# Patient Record
Sex: MALE
[De-identification: names, ages, dates, MRNs, and addresses within clinical notes are randomized per-mention and may not be internally consistent; named-entity substitution may affect disease eponyms.]

---

## 2017-07-11 ENCOUNTER — RECORDS - HEALTHEAST (OUTPATIENT)
Dept: ADMINISTRATIVE | Facility: OTHER | Age: 82
End: 2017-07-11

## 2018-06-08 ENCOUNTER — RECORDS - HEALTHEAST (OUTPATIENT)
Dept: ADMINISTRATIVE | Facility: OTHER | Age: 83
End: 2018-06-08

## 2018-07-05 ENCOUNTER — RECORDS - HEALTHEAST (OUTPATIENT)
Dept: ADMINISTRATIVE | Facility: OTHER | Age: 83
End: 2018-07-05

## 2018-07-31 ENCOUNTER — RECORDS - HEALTHEAST (OUTPATIENT)
Dept: ADMINISTRATIVE | Facility: OTHER | Age: 83
End: 2018-07-31

## 2018-07-31 ENCOUNTER — RECORDS - HEALTHEAST (OUTPATIENT)
Dept: LAB | Facility: CLINIC | Age: 83
End: 2018-07-31

## 2018-07-31 LAB
ALBUMIN SERPL-MCNC: 3.5 G/DL (ref 3.5–5)
ALP SERPL-CCNC: 131 U/L (ref 45–120)
ALT SERPL W P-5'-P-CCNC: 36 U/L (ref 0–45)
ANION GAP SERPL CALCULATED.3IONS-SCNC: 9 MMOL/L (ref 5–18)
AST SERPL W P-5'-P-CCNC: 32 U/L (ref 0–40)
BILIRUB SERPL-MCNC: 0.6 MG/DL (ref 0–1)
BUN SERPL-MCNC: 21 MG/DL (ref 8–28)
CALCIUM SERPL-MCNC: 9.4 MG/DL (ref 8.5–10.5)
CHLORIDE BLD-SCNC: 103 MMOL/L (ref 98–107)
CO2 SERPL-SCNC: 27 MMOL/L (ref 22–31)
CREAT SERPL-MCNC: 1.15 MG/DL (ref 0.7–1.3)
GFR SERPL CREATININE-BSD FRML MDRD: >60 ML/MIN/1.73M2
GLUCOSE BLD-MCNC: 121 MG/DL (ref 70–125)
POTASSIUM BLD-SCNC: 5.2 MMOL/L (ref 3.5–5)
PROT SERPL-MCNC: 6.8 G/DL (ref 6–8)
SODIUM SERPL-SCNC: 139 MMOL/L (ref 136–145)
TSH SERPL DL<=0.005 MIU/L-ACNC: 0.09 UIU/ML (ref 0.3–5)

## 2018-08-06 ENCOUNTER — RECORDS - HEALTHEAST (OUTPATIENT)
Dept: ADMINISTRATIVE | Facility: OTHER | Age: 83
End: 2018-08-06

## 2018-09-06 ENCOUNTER — OFFICE VISIT - HEALTHEAST (OUTPATIENT)
Dept: FAMILY MEDICINE | Facility: CLINIC | Age: 83
End: 2018-09-06

## 2018-09-06 DIAGNOSIS — Z87.442 HISTORY OF KIDNEY STONES: ICD-10-CM

## 2018-09-06 DIAGNOSIS — N40.0 BPH (BENIGN PROSTATIC HYPERPLASIA): ICD-10-CM

## 2018-09-06 DIAGNOSIS — E11.9 TYPE 2 DIABETES MELLITUS WITHOUT COMPLICATION, WITHOUT LONG-TERM CURRENT USE OF INSULIN (H): ICD-10-CM

## 2018-09-06 DIAGNOSIS — Z76.89 ESTABLISHING CARE WITH NEW DOCTOR, ENCOUNTER FOR: ICD-10-CM

## 2018-09-06 DIAGNOSIS — E03.9 HYPOTHYROIDISM: ICD-10-CM

## 2018-09-06 DIAGNOSIS — R06.02 SOB (SHORTNESS OF BREATH): ICD-10-CM

## 2018-09-06 DIAGNOSIS — I10 ESSENTIAL HYPERTENSION: ICD-10-CM

## 2018-09-06 LAB
ALBUMIN SERPL-MCNC: 3.3 G/DL (ref 3.5–5)
ALP SERPL-CCNC: 122 U/L (ref 45–120)
ALT SERPL W P-5'-P-CCNC: 40 U/L (ref 0–45)
ANION GAP SERPL CALCULATED.3IONS-SCNC: 8 MMOL/L (ref 5–18)
AST SERPL W P-5'-P-CCNC: 28 U/L (ref 0–40)
BASOPHILS # BLD AUTO: 0 THOU/UL (ref 0–0.2)
BASOPHILS NFR BLD AUTO: 0 % (ref 0–2)
BILIRUB SERPL-MCNC: 0.6 MG/DL (ref 0–1)
BUN SERPL-MCNC: 22 MG/DL (ref 8–28)
CALCIUM SERPL-MCNC: 9.2 MG/DL (ref 8.5–10.5)
CHLORIDE BLD-SCNC: 102 MMOL/L (ref 98–107)
CO2 SERPL-SCNC: 30 MMOL/L (ref 22–31)
CREAT SERPL-MCNC: 1.09 MG/DL (ref 0.7–1.3)
EOSINOPHIL # BLD AUTO: 0.2 THOU/UL (ref 0–0.4)
EOSINOPHIL NFR BLD AUTO: 2 % (ref 0–6)
ERYTHROCYTE [DISTWIDTH] IN BLOOD BY AUTOMATED COUNT: 13.6 % (ref 11–14.5)
GFR SERPL CREATININE-BSD FRML MDRD: >60 ML/MIN/1.73M2
GLUCOSE BLD-MCNC: 84 MG/DL (ref 70–125)
HBA1C MFR BLD: 6.7 % (ref 3.5–6)
HCT VFR BLD AUTO: 39.5 % (ref 40–54)
HGB BLD-MCNC: 13.1 G/DL (ref 14–18)
LYMPHOCYTES # BLD AUTO: 1 THOU/UL (ref 0.8–4.4)
LYMPHOCYTES NFR BLD AUTO: 14 % (ref 20–40)
MCH RBC QN AUTO: 32.3 PG (ref 27–34)
MCHC RBC AUTO-ENTMCNC: 33.2 G/DL (ref 32–36)
MCV RBC AUTO: 97 FL (ref 80–100)
MONOCYTES # BLD AUTO: 1 THOU/UL (ref 0–0.9)
MONOCYTES NFR BLD AUTO: 14 % (ref 2–10)
NEUTROPHILS # BLD AUTO: 5.1 THOU/UL (ref 2–7.7)
NEUTROPHILS NFR BLD AUTO: 70 % (ref 50–70)
PLATELET # BLD AUTO: 133 THOU/UL (ref 140–440)
PMV BLD AUTO: 9 FL (ref 7–10)
POTASSIUM BLD-SCNC: 4.4 MMOL/L (ref 3.5–5)
PROT SERPL-MCNC: 6.6 G/DL (ref 6–8)
RBC # BLD AUTO: 4.06 MILL/UL (ref 4.4–6.2)
SODIUM SERPL-SCNC: 140 MMOL/L (ref 136–145)
TSH SERPL DL<=0.005 MIU/L-ACNC: 1.51 UIU/ML (ref 0.3–5)
WBC: 7.3 THOU/UL (ref 4–11)

## 2018-09-06 ASSESSMENT — MIFFLIN-ST. JEOR: SCORE: 1585.04

## 2018-09-07 ENCOUNTER — RECORDS - HEALTHEAST (OUTPATIENT)
Dept: ADMINISTRATIVE | Facility: OTHER | Age: 83
End: 2018-09-07

## 2018-09-11 ENCOUNTER — COMMUNICATION - HEALTHEAST (OUTPATIENT)
Dept: FAMILY MEDICINE | Facility: CLINIC | Age: 83
End: 2018-09-11

## 2018-09-12 ENCOUNTER — COMMUNICATION - HEALTHEAST (OUTPATIENT)
Dept: FAMILY MEDICINE | Facility: CLINIC | Age: 83
End: 2018-09-12

## 2018-09-14 ENCOUNTER — COMMUNICATION - HEALTHEAST (OUTPATIENT)
Dept: FAMILY MEDICINE | Facility: CLINIC | Age: 83
End: 2018-09-14

## 2018-09-14 DIAGNOSIS — R68.89 DECREASED EXERCISE TOLERANCE: ICD-10-CM

## 2018-09-14 DIAGNOSIS — R06.09 DYSPNEA ON EXERTION: ICD-10-CM

## 2018-09-14 DIAGNOSIS — R05.3 CHRONIC COUGH: ICD-10-CM

## 2018-09-26 ENCOUNTER — COMMUNICATION - HEALTHEAST (OUTPATIENT)
Dept: FAMILY MEDICINE | Facility: CLINIC | Age: 83
End: 2018-09-26

## 2018-09-27 ENCOUNTER — AMBULATORY - HEALTHEAST (OUTPATIENT)
Dept: FAMILY MEDICINE | Facility: CLINIC | Age: 83
End: 2018-09-27

## 2018-09-27 DIAGNOSIS — I10 ESSENTIAL HYPERTENSION: ICD-10-CM

## 2018-10-10 ENCOUNTER — COMMUNICATION - HEALTHEAST (OUTPATIENT)
Dept: FAMILY MEDICINE | Facility: CLINIC | Age: 83
End: 2018-10-10

## 2018-10-10 DIAGNOSIS — E11.9 TYPE 2 DIABETES MELLITUS WITHOUT COMPLICATION, WITHOUT LONG-TERM CURRENT USE OF INSULIN (H): ICD-10-CM

## 2018-10-11 ENCOUNTER — COMMUNICATION - HEALTHEAST (OUTPATIENT)
Dept: FAMILY MEDICINE | Facility: CLINIC | Age: 83
End: 2018-10-11

## 2018-10-11 ENCOUNTER — OFFICE VISIT - HEALTHEAST (OUTPATIENT)
Dept: FAMILY MEDICINE | Facility: CLINIC | Age: 83
End: 2018-10-11

## 2018-10-11 DIAGNOSIS — R06.02 SOB (SHORTNESS OF BREATH): ICD-10-CM

## 2018-10-11 DIAGNOSIS — I10 ESSENTIAL HYPERTENSION: ICD-10-CM

## 2018-10-13 ENCOUNTER — COMMUNICATION - HEALTHEAST (OUTPATIENT)
Dept: FAMILY MEDICINE | Facility: CLINIC | Age: 83
End: 2018-10-13

## 2018-10-15 ENCOUNTER — AMBULATORY - HEALTHEAST (OUTPATIENT)
Dept: FAMILY MEDICINE | Facility: CLINIC | Age: 83
End: 2018-10-15

## 2018-10-15 DIAGNOSIS — E11.9 TYPE 2 DIABETES MELLITUS WITHOUT COMPLICATION, WITHOUT LONG-TERM CURRENT USE OF INSULIN (H): ICD-10-CM

## 2018-10-16 ENCOUNTER — HOSPITAL ENCOUNTER (OUTPATIENT)
Dept: NUCLEAR MEDICINE | Facility: HOSPITAL | Age: 83
Discharge: HOME | End: 2018-10-16
Attending: FAMILY MEDICINE

## 2018-10-16 ENCOUNTER — HOSPITAL ENCOUNTER (OUTPATIENT)
Dept: CARDIOLOGY | Facility: HOSPITAL | Age: 83
Discharge: HOME | End: 2018-10-16
Attending: FAMILY MEDICINE

## 2018-10-16 DIAGNOSIS — R68.89 DECREASED EXERCISE TOLERANCE: ICD-10-CM

## 2018-10-16 DIAGNOSIS — R06.09 OTHER FORMS OF DYSPNEA: ICD-10-CM

## 2018-10-16 DIAGNOSIS — R06.09 DYSPNEA ON EXERTION: ICD-10-CM

## 2018-10-16 LAB
CV STRESS CURRENT BP HE: NORMAL
CV STRESS CURRENT HR HE: 103
CV STRESS CURRENT HR HE: 103
CV STRESS CURRENT HR HE: 105
CV STRESS CURRENT HR HE: 106
CV STRESS CURRENT HR HE: 73
CV STRESS CURRENT HR HE: 74
CV STRESS CURRENT HR HE: 78
CV STRESS CURRENT HR HE: 86
CV STRESS CURRENT HR HE: 87
CV STRESS CURRENT HR HE: 88
CV STRESS CURRENT HR HE: 90
CV STRESS CURRENT HR HE: 91
CV STRESS CURRENT HR HE: 92
CV STRESS CURRENT HR HE: 96
CV STRESS CURRENT HR HE: 97
CV STRESS CURRENT HR HE: 97
CV STRESS DEVIATION TIME HE: NORMAL
CV STRESS ECHO PERCENT HR HE: NORMAL
CV STRESS EXERCISE STAGE HE: NORMAL
CV STRESS FINAL RESTING BP HE: NORMAL
CV STRESS FINAL RESTING HR HE: 87
CV STRESS MAX HR HE: 107
CV STRESS MAX TREADMILL GRADE HE: 0
CV STRESS MAX TREADMILL SPEED HE: 0
CV STRESS PEAK DIA BP HE: NORMAL
CV STRESS PEAK SYS BP HE: NORMAL
CV STRESS PHASE HE: NORMAL
CV STRESS PROTOCOL HE: NORMAL
CV STRESS RESTING PT POSITION HE: NORMAL
CV STRESS ST DEVIATION AMOUNT HE: NORMAL
CV STRESS ST DEVIATION ELEVATION HE: NORMAL
CV STRESS ST EVELATION AMOUNT HE: NORMAL
CV STRESS TEST TYPE HE: NORMAL
CV STRESS TOTAL STAGE TIME MIN 1 HE: NORMAL
NUC STRESS EJECTION FRACTION: 76 %
STRESS ECHO BASELINE BP: NORMAL
STRESS ECHO BASELINE HR: 73
STRESS ECHO CALCULATED PERCENT HR: 79 %
STRESS ECHO LAST STRESS BP: NORMAL
STRESS ECHO LAST STRESS HR: 97

## 2018-10-17 ENCOUNTER — COMMUNICATION - HEALTHEAST (OUTPATIENT)
Dept: FAMILY MEDICINE | Facility: CLINIC | Age: 83
End: 2018-10-17

## 2018-10-23 ENCOUNTER — HOSPITAL ENCOUNTER (OUTPATIENT)
Dept: RESPIRATORY THERAPY | Facility: HOSPITAL | Age: 83
Discharge: HOME | End: 2018-10-23

## 2018-10-23 DIAGNOSIS — R06.09 DYSPNEA ON EXERTION: ICD-10-CM

## 2018-10-23 DIAGNOSIS — R68.89 DECREASED EXERCISE TOLERANCE: ICD-10-CM

## 2018-10-23 DIAGNOSIS — R05.3 CHRONIC COUGH: ICD-10-CM

## 2018-10-23 DIAGNOSIS — R06.09 OTHER FORMS OF DYSPNEA: ICD-10-CM

## 2018-10-23 LAB — HGB BLD-MCNC: 13.1 G/DL (ref 14–18)

## 2018-10-25 ENCOUNTER — COMMUNICATION - HEALTHEAST (OUTPATIENT)
Dept: FAMILY MEDICINE | Facility: CLINIC | Age: 83
End: 2018-10-25

## 2018-10-25 DIAGNOSIS — E03.9 HYPOTHYROIDISM: ICD-10-CM

## 2018-11-01 ENCOUNTER — OFFICE VISIT - HEALTHEAST (OUTPATIENT)
Dept: FAMILY MEDICINE | Facility: CLINIC | Age: 83
End: 2018-11-01

## 2018-11-01 ENCOUNTER — COMMUNICATION - HEALTHEAST (OUTPATIENT)
Dept: FAMILY MEDICINE | Facility: CLINIC | Age: 83
End: 2018-11-01

## 2018-11-01 DIAGNOSIS — E03.9 HYPOTHYROIDISM: ICD-10-CM

## 2018-11-01 DIAGNOSIS — D64.9 LOW HEMOGLOBIN: ICD-10-CM

## 2018-11-01 DIAGNOSIS — I10 ESSENTIAL HYPERTENSION: ICD-10-CM

## 2018-11-01 DIAGNOSIS — G47.10 HYPERSOMNIA: ICD-10-CM

## 2018-11-01 DIAGNOSIS — J45.50 SEVERE PERSISTENT ASTHMA WITHOUT COMPLICATION (H): ICD-10-CM

## 2018-11-01 LAB
C REACTIVE PROTEIN LHE: 0.4 MG/DL (ref 0–0.8)
ERYTHROCYTE [SEDIMENTATION RATE] IN BLOOD BY WESTERGREN METHOD: 7 MM/HR (ref 0–15)
FERRITIN SERPL-MCNC: 60 NG/ML (ref 27–300)

## 2018-11-02 ENCOUNTER — COMMUNICATION - HEALTHEAST (OUTPATIENT)
Dept: FAMILY MEDICINE | Facility: CLINIC | Age: 83
End: 2018-11-02

## 2018-11-02 DIAGNOSIS — J45.50 SEVERE PERSISTENT ASTHMA WITHOUT COMPLICATION (H): ICD-10-CM

## 2018-11-02 LAB
BASOPHILS # BLD AUTO: 0 THOU/UL (ref 0–0.2)
BASOPHILS NFR BLD AUTO: 1 % (ref 0–2)
EOSINOPHIL # BLD AUTO: 0.1 THOU/UL (ref 0–0.4)
EOSINOPHIL NFR BLD AUTO: 3 % (ref 0–6)
ERYTHROCYTE [DISTWIDTH] IN BLOOD BY AUTOMATED COUNT: 14.8 % (ref 11–14.5)
HCT VFR BLD AUTO: 39.1 % (ref 40–54)
HGB BLD-MCNC: 12.9 G/DL (ref 14–18)
LAB AP CHARGES (HE HISTORICAL CONVERSION): NORMAL
LYMPHOCYTES # BLD AUTO: 1 THOU/UL (ref 0.8–4.4)
LYMPHOCYTES NFR BLD AUTO: 18 % (ref 20–40)
MCH RBC QN AUTO: 32.6 PG (ref 27–34)
MCHC RBC AUTO-ENTMCNC: 33 G/DL (ref 32–36)
MCV RBC AUTO: 99 FL (ref 80–100)
MONOCYTES # BLD AUTO: 0.7 THOU/UL (ref 0–0.9)
MONOCYTES NFR BLD AUTO: 13 % (ref 2–10)
NEUTROPHILS # BLD AUTO: 3.7 THOU/UL (ref 2–7.7)
NEUTROPHILS NFR BLD AUTO: 67 % (ref 50–70)
PATH REPORT.COMMENTS IMP SPEC: NORMAL
PATH REPORT.COMMENTS IMP SPEC: NORMAL
PATH REPORT.FINAL DX SPEC: NORMAL
PATH REPORT.MICROSCOPIC SPEC OTHER STN: ABNORMAL
PATH REPORT.MICROSCOPIC SPEC OTHER STN: NORMAL
PATH REPORT.RELEVANT HX SPEC: NORMAL
PLATELET # BLD AUTO: 134 THOU/UL (ref 140–440)
PMV BLD AUTO: 12 FL (ref 8.5–12.5)
RBC # BLD AUTO: 3.96 MILL/UL (ref 4.4–6.2)
WBC: 5.5 THOU/UL (ref 4–11)

## 2018-11-03 ENCOUNTER — COMMUNICATION - HEALTHEAST (OUTPATIENT)
Dept: FAMILY MEDICINE | Facility: CLINIC | Age: 83
End: 2018-11-03

## 2018-11-05 ENCOUNTER — COMMUNICATION - HEALTHEAST (OUTPATIENT)
Dept: FAMILY MEDICINE | Facility: CLINIC | Age: 83
End: 2018-11-05

## 2018-11-05 ENCOUNTER — AMBULATORY - HEALTHEAST (OUTPATIENT)
Dept: FAMILY MEDICINE | Facility: CLINIC | Age: 83
End: 2018-11-05

## 2018-11-05 DIAGNOSIS — D69.6 THROMBOCYTOPENIA (H): ICD-10-CM

## 2018-11-05 DIAGNOSIS — D64.9 LOW HEMOGLOBIN: ICD-10-CM

## 2018-11-05 LAB
ALBUMIN PERCENT: 57.5 % (ref 51–67)
ALBUMIN SERPL ELPH-MCNC: 3.7 G/DL (ref 3.2–4.7)
ALPHA 1 PERCENT: 2.1 % (ref 2–4)
ALPHA 2 PERCENT: 9 % (ref 5–13)
ALPHA1 GLOB SERPL ELPH-MCNC: 0.1 G/DL (ref 0.1–0.3)
ALPHA2 GLOB SERPL ELPH-MCNC: 0.6 G/DL (ref 0.4–0.9)
B-GLOBULIN SERPL ELPH-MCNC: 0.7 G/DL (ref 0.7–1.2)
BETA PERCENT: 11.1 % (ref 10–17)
GAMMA GLOB SERPL ELPH-MCNC: 1.3 G/DL (ref 0.6–1.4)
GAMMA GLOBULIN PERCENT: 20.3 % (ref 9–20)
PATH ICD:: ABNORMAL
PROT PATTERN SERPL ELPH-IMP: ABNORMAL
PROT SERPL-MCNC: 6.4 G/DL (ref 6–8)
REVIEWING PATHOLOGIST: ABNORMAL

## 2018-11-06 ENCOUNTER — COMMUNICATION - HEALTHEAST (OUTPATIENT)
Dept: FAMILY MEDICINE | Facility: CLINIC | Age: 83
End: 2018-11-06

## 2018-11-08 ENCOUNTER — COMMUNICATION - HEALTHEAST (OUTPATIENT)
Dept: ONCOLOGY | Facility: HOSPITAL | Age: 83
End: 2018-11-08

## 2018-11-09 ENCOUNTER — COMMUNICATION - HEALTHEAST (OUTPATIENT)
Dept: FAMILY MEDICINE | Facility: CLINIC | Age: 83
End: 2018-11-09

## 2018-11-09 DIAGNOSIS — I10 ESSENTIAL HYPERTENSION: ICD-10-CM

## 2018-11-14 ENCOUNTER — COMMUNICATION - HEALTHEAST (OUTPATIENT)
Dept: FAMILY MEDICINE | Facility: CLINIC | Age: 83
End: 2018-11-14

## 2018-11-26 ENCOUNTER — COMMUNICATION - HEALTHEAST (OUTPATIENT)
Dept: SCHEDULING | Facility: CLINIC | Age: 83
End: 2018-11-26

## 2018-11-26 ENCOUNTER — COMMUNICATION - HEALTHEAST (OUTPATIENT)
Dept: FAMILY MEDICINE | Facility: CLINIC | Age: 83
End: 2018-11-26

## 2018-11-26 DIAGNOSIS — E11.9 TYPE 2 DIABETES MELLITUS WITHOUT COMPLICATION, WITHOUT LONG-TERM CURRENT USE OF INSULIN (H): ICD-10-CM

## 2018-11-28 ENCOUNTER — COMMUNICATION - HEALTHEAST (OUTPATIENT)
Dept: FAMILY MEDICINE | Facility: CLINIC | Age: 83
End: 2018-11-28

## 2018-11-28 DIAGNOSIS — E11.9 TYPE 2 DIABETES MELLITUS WITHOUT COMPLICATION, WITHOUT LONG-TERM CURRENT USE OF INSULIN (H): ICD-10-CM

## 2018-11-29 ENCOUNTER — AMBULATORY - HEALTHEAST (OUTPATIENT)
Dept: INFUSION THERAPY | Facility: HOSPITAL | Age: 83
End: 2018-11-29

## 2018-11-29 ENCOUNTER — OFFICE VISIT - HEALTHEAST (OUTPATIENT)
Dept: ONCOLOGY | Facility: HOSPITAL | Age: 83
End: 2018-11-29

## 2018-11-29 DIAGNOSIS — D64.9 LOW HEMOGLOBIN: ICD-10-CM

## 2018-11-29 DIAGNOSIS — D69.6 THROMBOCYTOPENIA (H): ICD-10-CM

## 2018-11-29 LAB
ALBUMIN UR-MCNC: NEGATIVE MG/DL
APPEARANCE UR: CLEAR
BACTERIA #/AREA URNS HPF: ABNORMAL HPF
BILIRUB UR QL STRIP: NEGATIVE
COLOR UR AUTO: YELLOW
FOLATE SERPL-MCNC: 18.7 NG/ML
GLUCOSE UR STRIP-MCNC: NEGATIVE MG/DL
HGB UR QL STRIP: NEGATIVE
HYALINE CASTS #/AREA URNS LPF: ABNORMAL LPF
KETONES UR STRIP-MCNC: NEGATIVE MG/DL
LEUKOCYTE ESTERASE UR QL STRIP: ABNORMAL
MUCOUS THREADS #/AREA URNS LPF: ABNORMAL LPF
NITRATE UR QL: NEGATIVE
PH UR STRIP: 5.5 [PH] (ref 4.5–8)
RBC #/AREA URNS AUTO: ABNORMAL HPF
RETICS # AUTO: 0.05 MILL/UL (ref 0.01–0.11)
SP GR UR STRIP: 1.02 (ref 1–1.03)
SQUAMOUS #/AREA URNS AUTO: ABNORMAL LPF
UROBILINOGEN UR STRIP-ACNC: ABNORMAL
VIT B12 SERPL-MCNC: 249 PG/ML (ref 213–816)
WBC #/AREA URNS AUTO: ABNORMAL HPF

## 2018-11-29 ASSESSMENT — MIFFLIN-ST. JEOR: SCORE: 1582.77

## 2018-11-30 ENCOUNTER — OFFICE VISIT - HEALTHEAST (OUTPATIENT)
Dept: FAMILY MEDICINE | Facility: CLINIC | Age: 83
End: 2018-11-30

## 2018-11-30 DIAGNOSIS — D69.6 THROMBOCYTOPENIA (H): ICD-10-CM

## 2018-11-30 DIAGNOSIS — D64.9 LOW HEMOGLOBIN: ICD-10-CM

## 2018-11-30 DIAGNOSIS — J45.50 SEVERE PERSISTENT ASTHMA WITHOUT COMPLICATION (H): ICD-10-CM

## 2018-11-30 DIAGNOSIS — I10 ESSENTIAL HYPERTENSION: ICD-10-CM

## 2018-12-06 ENCOUNTER — COMMUNICATION - HEALTHEAST (OUTPATIENT)
Dept: ONCOLOGY | Facility: HOSPITAL | Age: 83
End: 2018-12-06

## 2018-12-07 ENCOUNTER — COMMUNICATION - HEALTHEAST (OUTPATIENT)
Dept: FAMILY MEDICINE | Facility: CLINIC | Age: 83
End: 2018-12-07

## 2018-12-21 ENCOUNTER — OFFICE VISIT - HEALTHEAST (OUTPATIENT)
Dept: PULMONOLOGY | Facility: OTHER | Age: 83
End: 2018-12-21

## 2018-12-21 DIAGNOSIS — I49.9 CARDIAC ARRHYTHMIA, UNSPECIFIED CARDIAC ARRHYTHMIA TYPE: ICD-10-CM

## 2018-12-21 DIAGNOSIS — I48.91 ATRIAL FIBRILLATION WITH RAPID VENTRICULAR RESPONSE (H): ICD-10-CM

## 2018-12-21 DIAGNOSIS — R06.09 DOE (DYSPNEA ON EXERTION): ICD-10-CM

## 2018-12-21 LAB
ATRIAL RATE - MUSE: NORMAL BPM
DIASTOLIC BLOOD PRESSURE - MUSE: NORMAL MMHG
INTERPRETATION ECG - MUSE: NORMAL
P AXIS - MUSE: NORMAL DEGREES
PR INTERVAL - MUSE: NORMAL MS
QRS DURATION - MUSE: 78 MS
QT - MUSE: 356 MS
QTC - MUSE: 505 MS
R AXIS - MUSE: 43 DEGREES
SYSTOLIC BLOOD PRESSURE - MUSE: NORMAL MMHG
T AXIS - MUSE: 61 DEGREES
VENTRICULAR RATE- MUSE: 121 BPM

## 2018-12-21 ASSESSMENT — MIFFLIN-ST. JEOR: SCORE: 1566.89

## 2018-12-22 ENCOUNTER — COMMUNICATION - HEALTHEAST (OUTPATIENT)
Dept: FAMILY MEDICINE | Facility: CLINIC | Age: 83
End: 2018-12-22

## 2018-12-26 ENCOUNTER — OFFICE VISIT - HEALTHEAST (OUTPATIENT)
Dept: CARDIOLOGY | Facility: CLINIC | Age: 83
End: 2018-12-26

## 2018-12-26 DIAGNOSIS — I48.19 PERSISTENT ATRIAL FIBRILLATION (H): ICD-10-CM

## 2018-12-26 DIAGNOSIS — I48.91 ATRIAL FIBRILLATION (H): ICD-10-CM

## 2018-12-26 DIAGNOSIS — I10 ESSENTIAL HYPERTENSION: ICD-10-CM

## 2018-12-26 LAB
ATRIAL RATE - MUSE: NORMAL BPM
DIASTOLIC BLOOD PRESSURE - MUSE: NORMAL MMHG
INTERPRETATION ECG - MUSE: NORMAL
P AXIS - MUSE: NORMAL DEGREES
PR INTERVAL - MUSE: NORMAL MS
QRS DURATION - MUSE: 80 MS
QT - MUSE: 376 MS
QTC - MUSE: 444 MS
R AXIS - MUSE: 30 DEGREES
SYSTOLIC BLOOD PRESSURE - MUSE: NORMAL MMHG
T AXIS - MUSE: 48 DEGREES
VENTRICULAR RATE- MUSE: 84 BPM

## 2018-12-26 ASSESSMENT — MIFFLIN-ST. JEOR: SCORE: 1579.54

## 2018-12-27 ENCOUNTER — COMMUNICATION - HEALTHEAST (OUTPATIENT)
Dept: FAMILY MEDICINE | Facility: CLINIC | Age: 83
End: 2018-12-27

## 2018-12-27 ENCOUNTER — AMBULATORY - HEALTHEAST (OUTPATIENT)
Dept: CARDIOLOGY | Facility: CLINIC | Age: 83
End: 2018-12-27

## 2018-12-28 ENCOUNTER — COMMUNICATION - HEALTHEAST (OUTPATIENT)
Dept: CARDIOLOGY | Facility: CLINIC | Age: 83
End: 2018-12-28

## 2019-01-01 ENCOUNTER — COMMUNICATION - HEALTHEAST (OUTPATIENT)
Dept: CARDIOLOGY | Facility: CLINIC | Age: 84
End: 2019-01-01

## 2019-01-01 ENCOUNTER — COMMUNICATION - HEALTHEAST (OUTPATIENT)
Dept: PULMONOLOGY | Facility: OTHER | Age: 84
End: 2019-01-01

## 2019-01-01 ENCOUNTER — COMMUNICATION - HEALTHEAST (OUTPATIENT)
Dept: FAMILY MEDICINE | Facility: CLINIC | Age: 84
End: 2019-01-01

## 2019-01-01 DIAGNOSIS — I48.91 ATRIAL FIBRILLATION (H): ICD-10-CM

## 2019-01-07 ENCOUNTER — COMMUNICATION - HEALTHEAST (OUTPATIENT)
Dept: FAMILY MEDICINE | Facility: CLINIC | Age: 84
End: 2019-01-07

## 2019-01-08 ENCOUNTER — COMMUNICATION - HEALTHEAST (OUTPATIENT)
Dept: CARDIOLOGY | Facility: CLINIC | Age: 84
End: 2019-01-08

## 2019-01-08 DIAGNOSIS — I48.91 ATRIAL FIBRILLATION (H): ICD-10-CM

## 2019-01-09 ENCOUNTER — COMMUNICATION - HEALTHEAST (OUTPATIENT)
Dept: FAMILY MEDICINE | Facility: CLINIC | Age: 84
End: 2019-01-09

## 2019-01-10 ENCOUNTER — COMMUNICATION - HEALTHEAST (OUTPATIENT)
Dept: FAMILY MEDICINE | Facility: CLINIC | Age: 84
End: 2019-01-10

## 2019-01-11 ENCOUNTER — COMMUNICATION - HEALTHEAST (OUTPATIENT)
Dept: FAMILY MEDICINE | Facility: CLINIC | Age: 84
End: 2019-01-11

## 2019-01-11 DIAGNOSIS — I48.91 ATRIAL FIBRILLATION (H): ICD-10-CM

## 2019-01-12 ENCOUNTER — COMMUNICATION - HEALTHEAST (OUTPATIENT)
Dept: FAMILY MEDICINE | Facility: CLINIC | Age: 84
End: 2019-01-12

## 2019-01-14 ENCOUNTER — COMMUNICATION - HEALTHEAST (OUTPATIENT)
Dept: FAMILY MEDICINE | Facility: CLINIC | Age: 84
End: 2019-01-14

## 2019-01-21 ENCOUNTER — COMMUNICATION - HEALTHEAST (OUTPATIENT)
Dept: FAMILY MEDICINE | Facility: CLINIC | Age: 84
End: 2019-01-21

## 2019-01-23 ENCOUNTER — COMMUNICATION - HEALTHEAST (OUTPATIENT)
Dept: FAMILY MEDICINE | Facility: CLINIC | Age: 84
End: 2019-01-23

## 2019-01-23 DIAGNOSIS — G47.10 HYPERSOMNIA: ICD-10-CM

## 2019-01-23 DIAGNOSIS — E03.9 HYPOTHYROIDISM: ICD-10-CM

## 2019-01-24 ENCOUNTER — COMMUNICATION - HEALTHEAST (OUTPATIENT)
Dept: FAMILY MEDICINE | Facility: CLINIC | Age: 84
End: 2019-01-24

## 2019-01-26 ENCOUNTER — COMMUNICATION - HEALTHEAST (OUTPATIENT)
Dept: FAMILY MEDICINE | Facility: CLINIC | Age: 84
End: 2019-01-26

## 2019-01-26 DIAGNOSIS — E03.9 HYPOTHYROIDISM: ICD-10-CM

## 2019-01-26 DIAGNOSIS — G47.10 HYPERSOMNIA: ICD-10-CM

## 2019-02-04 ENCOUNTER — COMMUNICATION - HEALTHEAST (OUTPATIENT)
Dept: FAMILY MEDICINE | Facility: CLINIC | Age: 84
End: 2019-02-04

## 2019-02-04 DIAGNOSIS — I10 ESSENTIAL HYPERTENSION: ICD-10-CM

## 2019-02-07 ENCOUNTER — OFFICE VISIT - HEALTHEAST (OUTPATIENT)
Dept: FAMILY MEDICINE | Facility: CLINIC | Age: 84
End: 2019-02-07

## 2019-02-07 ENCOUNTER — AMBULATORY - HEALTHEAST (OUTPATIENT)
Dept: CARDIOLOGY | Facility: CLINIC | Age: 84
End: 2019-02-07

## 2019-02-07 DIAGNOSIS — E11.9 TYPE 2 DIABETES MELLITUS WITHOUT COMPLICATION, WITHOUT LONG-TERM CURRENT USE OF INSULIN (H): ICD-10-CM

## 2019-02-07 DIAGNOSIS — D69.6 THROMBOCYTOPENIA (H): ICD-10-CM

## 2019-02-07 DIAGNOSIS — J44.9 SEVERE CHRONIC OBSTRUCTIVE PULMONARY DISEASE (H): ICD-10-CM

## 2019-02-07 DIAGNOSIS — D64.9 LOW HEMOGLOBIN: ICD-10-CM

## 2019-02-07 DIAGNOSIS — E03.9 HYPOTHYROIDISM, UNSPECIFIED TYPE: ICD-10-CM

## 2019-02-07 DIAGNOSIS — Z01.818 ENCOUNTER FOR PREOPERATIVE EXAMINATION FOR GENERAL SURGICAL PROCEDURE: ICD-10-CM

## 2019-02-07 DIAGNOSIS — I48.19 PERSISTENT ATRIAL FIBRILLATION (H): ICD-10-CM

## 2019-02-07 DIAGNOSIS — I10 ESSENTIAL HYPERTENSION: ICD-10-CM

## 2019-02-07 LAB
ALBUMIN SERPL-MCNC: 3.6 G/DL (ref 3.5–5)
ALP SERPL-CCNC: 188 U/L (ref 45–120)
ALT SERPL W P-5'-P-CCNC: 46 U/L (ref 0–45)
ANION GAP SERPL CALCULATED.3IONS-SCNC: 8 MMOL/L (ref 5–18)
AST SERPL W P-5'-P-CCNC: 41 U/L (ref 0–40)
BILIRUB SERPL-MCNC: 0.8 MG/DL (ref 0–1)
BUN SERPL-MCNC: 19 MG/DL (ref 8–28)
CALCIUM SERPL-MCNC: 9.8 MG/DL (ref 8.5–10.5)
CHLORIDE BLD-SCNC: 104 MMOL/L (ref 98–107)
CO2 SERPL-SCNC: 29 MMOL/L (ref 22–31)
CREAT SERPL-MCNC: 1.1 MG/DL (ref 0.7–1.3)
ERYTHROCYTE [DISTWIDTH] IN BLOOD BY AUTOMATED COUNT: 12.9 % (ref 11–14.5)
GFR SERPL CREATININE-BSD FRML MDRD: >60 ML/MIN/1.73M2
GLUCOSE BLD-MCNC: 98 MG/DL (ref 70–125)
HCT VFR BLD AUTO: 37.3 % (ref 40–54)
HGB BLD-MCNC: 12.3 G/DL (ref 14–18)
MCH RBC QN AUTO: 32.5 PG (ref 27–34)
MCHC RBC AUTO-ENTMCNC: 33 G/DL (ref 32–36)
MCV RBC AUTO: 99 FL (ref 80–100)
PLATELET # BLD AUTO: 112 THOU/UL (ref 140–440)
PMV BLD AUTO: 9 FL (ref 7–10)
POTASSIUM BLD-SCNC: 5.5 MMOL/L (ref 3.5–5)
PROT SERPL-MCNC: 6.7 G/DL (ref 6–8)
RBC # BLD AUTO: 3.78 MILL/UL (ref 4.4–6.2)
SODIUM SERPL-SCNC: 141 MMOL/L (ref 136–145)
TSH SERPL DL<=0.005 MIU/L-ACNC: 0.75 UIU/ML (ref 0.3–5)
WBC: 4.6 THOU/UL (ref 4–11)

## 2019-02-07 ASSESSMENT — MIFFLIN-ST. JEOR: SCORE: 1582.32

## 2019-02-08 LAB — HBA1C MFR BLD: 6.5 % (ref 4.2–6.1)

## 2019-02-11 ENCOUNTER — COMMUNICATION - HEALTHEAST (OUTPATIENT)
Dept: FAMILY MEDICINE | Facility: CLINIC | Age: 84
End: 2019-02-11

## 2019-02-11 ENCOUNTER — ANESTHESIA - HEALTHEAST (OUTPATIENT)
Dept: CARDIOLOGY | Facility: CLINIC | Age: 84
End: 2019-02-11

## 2019-02-11 ENCOUNTER — AMBULATORY - HEALTHEAST (OUTPATIENT)
Dept: CARDIOLOGY | Facility: CLINIC | Age: 84
End: 2019-02-11

## 2019-02-11 DIAGNOSIS — I48.19 PERSISTENT ATRIAL FIBRILLATION (H): ICD-10-CM

## 2019-02-12 ENCOUNTER — COMMUNICATION - HEALTHEAST (OUTPATIENT)
Dept: SCHEDULING | Facility: CLINIC | Age: 84
End: 2019-02-12

## 2019-02-12 ENCOUNTER — PATIENT OUTREACH (OUTPATIENT)
Dept: CARE COORDINATION | Facility: CLINIC | Age: 84
End: 2019-02-12

## 2019-02-12 ENCOUNTER — AMBULATORY - HEALTHEAST (OUTPATIENT)
Dept: CARDIOLOGY | Facility: CLINIC | Age: 84
End: 2019-02-12

## 2019-02-12 NOTE — PROGRESS NOTES
Clinic Care Coordination Contact    Clinic Care Coordination Contact  OUTREACH    Referral Information:  Referral Source: IP Report    Primary Diagnosis: Cardiovascular - other    Chief Complaint   Patient presents with     Clinic Care Coordination - Post Hospital     Dc'd from [facility] on 2/11/19       Clinical Concerns:  Current Medical Concerns:  Had a failed cardioversion yesterday. Is having another next Monday. No other concerns.     Current Behavioral Concerns: none identified.     Education Provided to patient: Reviewed role of care coordinator      Health Maintenance Reviewed: Due/Overdue       Living Situation:  Current living arrangement:: I live in a private home with spouse  Type of residence:: Private home - stairs         Financial/Insurance:   Financial/Insurance concerns (GOAL):: No  UCARE for seniors, no financial concerns.     Reso  Advance Care Plan/Directive  Advanced Care Plans/Directives on file:: No  Advanced Care Plan/Directive Status: Declined Further Information    Referrals Placed: None    Patient/Caregiver understanding: Patient and wife do not need any support from care coordinator at this time. Did not want to complete assessment.   He declined having letter with CC information mailed to him.      Plan: No further outreach by this CC.  Kenya Kendall,   [facility]  [email or web address]  [phone]

## 2019-02-15 ENCOUNTER — AMBULATORY - HEALTHEAST (OUTPATIENT)
Dept: CARDIOLOGY | Facility: CLINIC | Age: 84
End: 2019-02-15

## 2019-02-18 ENCOUNTER — ANESTHESIA - HEALTHEAST (OUTPATIENT)
Dept: CARDIOLOGY | Facility: CLINIC | Age: 84
End: 2019-02-18

## 2019-02-18 ENCOUNTER — HOSPITAL ENCOUNTER (OUTPATIENT)
Dept: CARDIOLOGY | Facility: CLINIC | Age: 84
Discharge: HOME | End: 2019-02-18
Attending: NURSE PRACTITIONER | Admitting: INTERNAL MEDICINE

## 2019-02-18 LAB
ATRIAL RATE - MUSE: 67 BPM
DIASTOLIC BLOOD PRESSURE - MUSE: NORMAL MMHG
INTERPRETATION ECG - MUSE: NORMAL
P AXIS - MUSE: 81 DEGREES
PR INTERVAL - MUSE: 228 MS
QRS DURATION - MUSE: 98 MS
QT - MUSE: 440 MS
QTC - MUSE: 464 MS
R AXIS - MUSE: 27 DEGREES
SYSTOLIC BLOOD PRESSURE - MUSE: NORMAL MMHG
T AXIS - MUSE: 44 DEGREES
VENTRICULAR RATE- MUSE: 67 BPM

## 2019-02-18 ASSESSMENT — MIFFLIN-ST. JEOR: SCORE: 1588.44

## 2019-03-01 ENCOUNTER — COMMUNICATION - HEALTHEAST (OUTPATIENT)
Dept: FAMILY MEDICINE | Facility: CLINIC | Age: 84
End: 2019-03-01

## 2019-03-01 ENCOUNTER — COMMUNICATION - HEALTHEAST (OUTPATIENT)
Dept: CARDIOLOGY | Facility: CLINIC | Age: 84
End: 2019-03-01

## 2019-03-03 ENCOUNTER — COMMUNICATION - HEALTHEAST (OUTPATIENT)
Dept: FAMILY MEDICINE | Facility: CLINIC | Age: 84
End: 2019-03-03

## 2019-03-06 ENCOUNTER — OFFICE VISIT - HEALTHEAST (OUTPATIENT)
Dept: PULMONOLOGY | Facility: OTHER | Age: 84
End: 2019-03-06

## 2019-03-06 DIAGNOSIS — R06.09 DYSPNEA ON EXERTION: ICD-10-CM

## 2019-03-06 DIAGNOSIS — J44.9 CHRONIC OBSTRUCTIVE PULMONARY DISEASE, UNSPECIFIED COPD TYPE (H): ICD-10-CM

## 2019-03-11 LAB — A1AT SERPL-MCNC: 88 MG/DL (ref 80–200)

## 2019-03-14 ENCOUNTER — OFFICE VISIT - HEALTHEAST (OUTPATIENT)
Dept: CARDIOLOGY | Facility: CLINIC | Age: 84
End: 2019-03-14

## 2019-03-14 DIAGNOSIS — I10 ESSENTIAL HYPERTENSION: ICD-10-CM

## 2019-03-14 DIAGNOSIS — I48.19 PERSISTENT ATRIAL FIBRILLATION (H): ICD-10-CM

## 2019-03-14 ASSESSMENT — MIFFLIN-ST. JEOR: SCORE: 1584.05

## 2019-03-15 ENCOUNTER — HOSPITAL ENCOUNTER (OUTPATIENT)
Dept: CARDIOLOGY | Facility: HOSPITAL | Age: 84
Discharge: HOME | End: 2019-03-15
Attending: NURSE PRACTITIONER

## 2019-03-15 ENCOUNTER — COMMUNICATION - HEALTHEAST (OUTPATIENT)
Dept: FAMILY MEDICINE | Facility: CLINIC | Age: 84
End: 2019-03-15

## 2019-03-15 ENCOUNTER — HOSPITAL ENCOUNTER (OUTPATIENT)
Dept: RESPIRATORY THERAPY | Facility: HOSPITAL | Age: 84
Discharge: HOME | End: 2019-03-15
Attending: INTERNAL MEDICINE

## 2019-03-15 ENCOUNTER — HOSPITAL ENCOUNTER (OUTPATIENT)
Dept: CT IMAGING | Facility: HOSPITAL | Age: 84
Discharge: HOME | End: 2019-03-15
Attending: INTERNAL MEDICINE

## 2019-03-15 DIAGNOSIS — J44.9 CHRONIC OBSTRUCTIVE PULMONARY DISEASE, UNSPECIFIED COPD TYPE (H): ICD-10-CM

## 2019-03-15 DIAGNOSIS — R06.09 OTHER FORMS OF DYSPNEA: ICD-10-CM

## 2019-03-15 DIAGNOSIS — R06.09 DYSPNEA ON EXERTION: ICD-10-CM

## 2019-03-15 DIAGNOSIS — I48.19 PERSISTENT ATRIAL FIBRILLATION (H): ICD-10-CM

## 2019-03-15 LAB — HGB BLD-MCNC: 12.8 G/DL (ref 14–18)

## 2019-03-19 ENCOUNTER — OFFICE VISIT - HEALTHEAST (OUTPATIENT)
Dept: PULMONOLOGY | Facility: OTHER | Age: 84
End: 2019-03-19

## 2019-03-19 DIAGNOSIS — J47.9 BRONCHIECTASIS WITHOUT ACUTE EXACERBATION (H): ICD-10-CM

## 2019-03-19 DIAGNOSIS — J43.9 PULMONARY EMPHYSEMA, UNSPECIFIED EMPHYSEMA TYPE (H): ICD-10-CM

## 2019-03-19 DIAGNOSIS — J43.1 PANLOBULAR EMPHYSEMA (H): ICD-10-CM

## 2019-03-19 DIAGNOSIS — K21.9 GASTROESOPHAGEAL REFLUX DISEASE, ESOPHAGITIS PRESENCE NOT SPECIFIED: ICD-10-CM

## 2019-03-19 LAB
IGA SERPL-MCNC: 1443 MG/DL
IGA SERPL-MCNC: 494 MG/DL (ref 65–400)
IGM SERPL-MCNC: 140 MG/DL (ref 60–280)

## 2019-03-19 ASSESSMENT — MIFFLIN-ST. JEOR: SCORE: 1578.24

## 2019-03-20 ENCOUNTER — AMBULATORY - HEALTHEAST (OUTPATIENT)
Dept: LAB | Facility: HOSPITAL | Age: 84
End: 2019-03-20

## 2019-03-20 DIAGNOSIS — J43.9 PULMONARY EMPHYSEMA, UNSPECIFIED EMPHYSEMA TYPE (H): ICD-10-CM

## 2019-03-20 DIAGNOSIS — J43.1 PANLOBULAR EMPHYSEMA (H): ICD-10-CM

## 2019-03-20 DIAGNOSIS — J47.9 BRONCHIECTASIS WITHOUT ACUTE EXACERBATION (H): ICD-10-CM

## 2019-03-20 DIAGNOSIS — K21.9 GASTROESOPHAGEAL REFLUX DISEASE, ESOPHAGITIS PRESENCE NOT SPECIFIED: ICD-10-CM

## 2019-03-20 LAB
BASOPHILS # BLD AUTO: 0 THOU/UL (ref 0–0.2)
BASOPHILS NFR BLD AUTO: 0 % (ref 0–2)
EOSINOPHIL # BLD AUTO: 0.1 THOU/UL (ref 0–0.4)
EOSINOPHIL NFR BLD AUTO: 1 % (ref 0–6)
ERYTHROCYTE [DISTWIDTH] IN BLOOD BY AUTOMATED COUNT: 16.2 % (ref 11–14.5)
HCT VFR BLD AUTO: 36.4 % (ref 40–54)
HGB BLD-MCNC: 12.1 G/DL (ref 14–18)
LYMPHOCYTES # BLD AUTO: 0.7 THOU/UL (ref 0.8–4.4)
LYMPHOCYTES NFR BLD AUTO: 8 % (ref 20–40)
MCH RBC QN AUTO: 32.8 PG (ref 27–34)
MCHC RBC AUTO-ENTMCNC: 33.2 G/DL (ref 32–36)
MCV RBC AUTO: 99 FL (ref 80–100)
MONOCYTES # BLD AUTO: 1 THOU/UL (ref 0–0.9)
MONOCYTES NFR BLD AUTO: 12 % (ref 2–10)
NEUTROPHILS # BLD AUTO: 6.3 THOU/UL (ref 2–7.7)
NEUTROPHILS NFR BLD AUTO: 78 % (ref 50–70)
PLATELET # BLD AUTO: 167 THOU/UL (ref 140–440)
PMV BLD AUTO: 10.4 FL (ref 8.5–12.5)
RBC # BLD AUTO: 3.69 MILL/UL (ref 4.4–6.2)
WBC: 8.1 THOU/UL (ref 4–11)

## 2019-03-22 LAB
IGG SERPL-MCNC: 1470 MG/DL (ref 695–1620)
IGG1 SER-MCNC: 686 MG/DL (ref 300–856)
IGG2 SER-MCNC: 493 MG/DL (ref 158–761)
IGG3 SER-MCNC: 225 MG/DL (ref 24–192)
IGG4 SER-MCNC: 56 MG/DL (ref 11–86)

## 2019-03-23 LAB
A1AT PHENOTYP SERPL-IMP: ABNORMAL
A1AT SERPL-MCNC: 101 MG/DL (ref 90–200)
A1AT SS SERPL-MCNC: NEGATIVE G/L
A1AT SZ SERPL-MCNC: ABNORMAL G/L
A1AT ZZ SERPL-MCNC: ABNORMAL G/L
SPECIMEN SOURCE: ABNORMAL

## 2019-04-01 ENCOUNTER — COMMUNICATION - HEALTHEAST (OUTPATIENT)
Dept: CARDIOLOGY | Facility: CLINIC | Age: 84
End: 2019-04-01

## 2019-04-01 ENCOUNTER — COMMUNICATION - HEALTHEAST (OUTPATIENT)
Dept: FAMILY MEDICINE | Facility: CLINIC | Age: 84
End: 2019-04-01

## 2019-04-01 ENCOUNTER — AMBULATORY - HEALTHEAST (OUTPATIENT)
Dept: CARDIOLOGY | Facility: CLINIC | Age: 84
End: 2019-04-01

## 2019-04-01 DIAGNOSIS — J44.9 CHRONIC OBSTRUCTIVE PULMONARY DISEASE, UNSPECIFIED COPD TYPE (H): ICD-10-CM

## 2019-04-01 DIAGNOSIS — I48.19 PERSISTENT ATRIAL FIBRILLATION (H): ICD-10-CM

## 2019-04-05 ENCOUNTER — COMMUNICATION - HEALTHEAST (OUTPATIENT)
Dept: CARDIOLOGY | Facility: CLINIC | Age: 84
End: 2019-04-05

## 2019-04-07 ENCOUNTER — COMMUNICATION - HEALTHEAST (OUTPATIENT)
Dept: FAMILY MEDICINE | Facility: CLINIC | Age: 84
End: 2019-04-07

## 2019-04-07 DIAGNOSIS — R35.1 NOCTURIA: ICD-10-CM

## 2019-04-12 ENCOUNTER — COMMUNICATION - HEALTHEAST (OUTPATIENT)
Dept: CARDIOLOGY | Facility: CLINIC | Age: 84
End: 2019-04-12

## 2019-04-15 ENCOUNTER — COMMUNICATION - HEALTHEAST (OUTPATIENT)
Dept: FAMILY MEDICINE | Facility: CLINIC | Age: 84
End: 2019-04-15

## 2019-04-16 ENCOUNTER — COMMUNICATION - HEALTHEAST (OUTPATIENT)
Dept: CARDIOLOGY | Facility: CLINIC | Age: 84
End: 2019-04-16

## 2019-04-17 ENCOUNTER — AMBULATORY - HEALTHEAST (OUTPATIENT)
Dept: CARDIOLOGY | Facility: CLINIC | Age: 84
End: 2019-04-17

## 2019-04-17 DIAGNOSIS — I48.19 PERSISTENT ATRIAL FIBRILLATION (H): ICD-10-CM

## 2019-04-19 ENCOUNTER — HOSPITAL ENCOUNTER (OUTPATIENT)
Dept: CARDIOLOGY | Facility: HOSPITAL | Age: 84
Discharge: HOME | End: 2019-04-19
Attending: NURSE PRACTITIONER

## 2019-04-19 DIAGNOSIS — I48.19 PERSISTENT ATRIAL FIBRILLATION (H): ICD-10-CM

## 2019-04-23 ENCOUNTER — COMMUNICATION - HEALTHEAST (OUTPATIENT)
Dept: FAMILY MEDICINE | Facility: CLINIC | Age: 84
End: 2019-04-23

## 2019-04-23 DIAGNOSIS — J44.9 SEVERE CHRONIC OBSTRUCTIVE PULMONARY DISEASE (H): ICD-10-CM

## 2019-04-24 ENCOUNTER — COMMUNICATION - HEALTHEAST (OUTPATIENT)
Dept: FAMILY MEDICINE | Facility: CLINIC | Age: 84
End: 2019-04-24

## 2019-04-24 DIAGNOSIS — G47.10 HYPERSOMNIA: ICD-10-CM

## 2019-04-24 DIAGNOSIS — E03.9 HYPOTHYROIDISM: ICD-10-CM

## 2019-04-25 ENCOUNTER — OFFICE VISIT - HEALTHEAST (OUTPATIENT)
Dept: CARDIOLOGY | Facility: CLINIC | Age: 84
End: 2019-04-25

## 2019-04-25 DIAGNOSIS — I48.19 PERSISTENT ATRIAL FIBRILLATION (H): ICD-10-CM

## 2019-04-25 DIAGNOSIS — I10 ESSENTIAL HYPERTENSION: ICD-10-CM

## 2019-04-25 ASSESSMENT — MIFFLIN-ST. JEOR: SCORE: 1582.78

## 2019-04-29 ENCOUNTER — AMBULATORY - HEALTHEAST (OUTPATIENT)
Dept: CARDIOLOGY | Facility: CLINIC | Age: 84
End: 2019-04-29

## 2019-04-29 DIAGNOSIS — I48.19 PERSISTENT ATRIAL FIBRILLATION (H): ICD-10-CM

## 2019-04-29 LAB
ATRIAL RATE - MUSE: 64 BPM
DIASTOLIC BLOOD PRESSURE - MUSE: NORMAL MMHG
INTERPRETATION ECG - MUSE: NORMAL
P AXIS - MUSE: 80 DEGREES
PR INTERVAL - MUSE: 200 MS
QRS DURATION - MUSE: 92 MS
QT - MUSE: 440 MS
QTC - MUSE: 453 MS
R AXIS - MUSE: 31 DEGREES
SYSTOLIC BLOOD PRESSURE - MUSE: NORMAL MMHG
T AXIS - MUSE: 62 DEGREES
VENTRICULAR RATE- MUSE: 64 BPM

## 2019-04-29 ASSESSMENT — MIFFLIN-ST. JEOR: SCORE: 1594.84

## 2019-04-30 ENCOUNTER — COMMUNICATION - HEALTHEAST (OUTPATIENT)
Dept: CARDIOLOGY | Facility: CLINIC | Age: 84
End: 2019-04-30

## 2019-04-30 ENCOUNTER — AMBULATORY - HEALTHEAST (OUTPATIENT)
Dept: CARDIOLOGY | Facility: CLINIC | Age: 84
End: 2019-04-30

## 2019-04-30 DIAGNOSIS — I48.19 PERSISTENT ATRIAL FIBRILLATION (H): ICD-10-CM

## 2019-05-17 ENCOUNTER — OFFICE VISIT - HEALTHEAST (OUTPATIENT)
Dept: PULMONOLOGY | Facility: OTHER | Age: 84
End: 2019-05-17

## 2019-05-17 DIAGNOSIS — R06.09 DYSPNEA ON EXERTION: ICD-10-CM

## 2019-05-17 DIAGNOSIS — J20.9 ACUTE BRONCHITIS, UNSPECIFIED ORGANISM: ICD-10-CM

## 2019-05-17 DIAGNOSIS — J44.9 SEVERE CHRONIC OBSTRUCTIVE PULMONARY DISEASE (H): ICD-10-CM

## 2019-05-17 DIAGNOSIS — G47.10 HYPERSOMNIA: ICD-10-CM

## 2019-05-17 ASSESSMENT — MIFFLIN-ST. JEOR: SCORE: 1612.82

## 2019-05-30 ENCOUNTER — OFFICE VISIT - HEALTHEAST (OUTPATIENT)
Dept: CARDIOLOGY | Facility: CLINIC | Age: 84
End: 2019-05-30

## 2019-05-30 DIAGNOSIS — I10 ESSENTIAL HYPERTENSION: ICD-10-CM

## 2019-05-30 DIAGNOSIS — R60.0 BILATERAL LOWER EXTREMITY EDEMA: ICD-10-CM

## 2019-05-30 DIAGNOSIS — R06.09 DOE (DYSPNEA ON EXERTION): ICD-10-CM

## 2019-05-30 DIAGNOSIS — I48.19 PERSISTENT ATRIAL FIBRILLATION (H): ICD-10-CM

## 2019-05-30 ASSESSMENT — MIFFLIN-ST. JEOR: SCORE: 1608.28

## 2019-05-31 ENCOUNTER — AMBULATORY - HEALTHEAST (OUTPATIENT)
Dept: CARDIOLOGY | Facility: CLINIC | Age: 84
End: 2019-05-31

## 2019-05-31 ENCOUNTER — COMMUNICATION - HEALTHEAST (OUTPATIENT)
Dept: CARDIOLOGY | Facility: CLINIC | Age: 84
End: 2019-05-31

## 2019-05-31 DIAGNOSIS — E11.9 TYPE 2 DIABETES MELLITUS WITHOUT COMPLICATION, WITHOUT LONG-TERM CURRENT USE OF INSULIN (H): ICD-10-CM

## 2019-06-06 ENCOUNTER — AMBULATORY - HEALTHEAST (OUTPATIENT)
Dept: SLEEP MEDICINE | Facility: CLINIC | Age: 84
End: 2019-06-06

## 2019-06-06 DIAGNOSIS — J44.9 SEVERE CHRONIC OBSTRUCTIVE PULMONARY DISEASE (H): ICD-10-CM

## 2019-06-06 DIAGNOSIS — G47.10 HYPERSOMNIA: ICD-10-CM

## 2019-07-05 ENCOUNTER — OFFICE VISIT - HEALTHEAST (OUTPATIENT)
Dept: FAMILY MEDICINE | Facility: CLINIC | Age: 84
End: 2019-07-05

## 2019-07-05 ENCOUNTER — RECORDS - HEALTHEAST (OUTPATIENT)
Dept: ADMINISTRATIVE | Facility: OTHER | Age: 84
End: 2019-07-05

## 2019-07-05 DIAGNOSIS — Z91.89: ICD-10-CM

## 2019-07-05 DIAGNOSIS — F69 BEHAVIOR CONCERN IN ADULT: ICD-10-CM

## 2019-07-10 ENCOUNTER — COMMUNICATION - HEALTHEAST (OUTPATIENT)
Dept: FAMILY MEDICINE | Facility: CLINIC | Age: 84
End: 2019-07-10

## 2019-07-23 ENCOUNTER — COMMUNICATION - HEALTHEAST (OUTPATIENT)
Dept: CARDIOLOGY | Facility: CLINIC | Age: 84
End: 2019-07-23

## 2019-07-24 ENCOUNTER — COMMUNICATION - HEALTHEAST (OUTPATIENT)
Dept: CARDIOLOGY | Facility: CLINIC | Age: 84
End: 2019-07-24

## 2019-07-24 DIAGNOSIS — R06.09 DOE (DYSPNEA ON EXERTION): ICD-10-CM

## 2019-07-26 ENCOUNTER — COMMUNICATION - HEALTHEAST (OUTPATIENT)
Dept: CARDIOLOGY | Facility: CLINIC | Age: 84
End: 2019-07-26

## 2019-07-26 ENCOUNTER — COMMUNICATION - HEALTHEAST (OUTPATIENT)
Dept: FAMILY MEDICINE | Facility: CLINIC | Age: 84
End: 2019-07-26

## 2019-07-27 ENCOUNTER — OFFICE VISIT - HEALTHEAST (OUTPATIENT)
Dept: FAMILY MEDICINE | Facility: CLINIC | Age: 84
End: 2019-07-27

## 2019-07-27 DIAGNOSIS — J20.9 ACUTE BRONCHITIS, UNSPECIFIED ORGANISM: ICD-10-CM

## 2019-08-06 ENCOUNTER — COMMUNICATION - HEALTHEAST (OUTPATIENT)
Dept: SCHEDULING | Facility: CLINIC | Age: 84
End: 2019-08-06

## 2019-08-09 ENCOUNTER — COMMUNICATION - HEALTHEAST (OUTPATIENT)
Dept: FAMILY MEDICINE | Facility: CLINIC | Age: 84
End: 2019-08-09

## 2019-08-09 ENCOUNTER — OFFICE VISIT - HEALTHEAST (OUTPATIENT)
Dept: FAMILY MEDICINE | Facility: CLINIC | Age: 84
End: 2019-08-09

## 2019-08-09 ENCOUNTER — RECORDS - HEALTHEAST (OUTPATIENT)
Dept: GENERAL RADIOLOGY | Facility: CLINIC | Age: 84
End: 2019-08-09

## 2019-08-09 DIAGNOSIS — R05.9 COUGH: ICD-10-CM

## 2019-08-09 DIAGNOSIS — R06.09 DOE (DYSPNEA ON EXERTION): ICD-10-CM

## 2019-08-09 DIAGNOSIS — R06.02 SHORTNESS OF BREATH: ICD-10-CM

## 2019-08-09 DIAGNOSIS — E03.9 HYPOTHYROIDISM, UNSPECIFIED TYPE: ICD-10-CM

## 2019-08-09 LAB
ALBUMIN SERPL-MCNC: 2.9 G/DL (ref 3.5–5)
ALP SERPL-CCNC: 141 U/L (ref 45–120)
ALT SERPL W P-5'-P-CCNC: 25 U/L (ref 0–45)
ANION GAP SERPL CALCULATED.3IONS-SCNC: 10 MMOL/L (ref 5–18)
AST SERPL W P-5'-P-CCNC: 29 U/L (ref 0–40)
BILIRUB SERPL-MCNC: 0.7 MG/DL (ref 0–1)
BNP SERPL-MCNC: 326 PG/ML (ref 0–93)
BUN SERPL-MCNC: 22 MG/DL (ref 8–28)
CALCIUM SERPL-MCNC: 8.8 MG/DL (ref 8.5–10.5)
CHLORIDE BLD-SCNC: 103 MMOL/L (ref 98–107)
CO2 SERPL-SCNC: 28 MMOL/L (ref 22–31)
CREAT SERPL-MCNC: 1.14 MG/DL (ref 0.7–1.3)
ERYTHROCYTE [DISTWIDTH] IN BLOOD BY AUTOMATED COUNT: 12.6 % (ref 11–14.5)
GFR SERPL CREATININE-BSD FRML MDRD: >60 ML/MIN/1.73M2
GLUCOSE BLD-MCNC: 146 MG/DL (ref 70–125)
HCT VFR BLD AUTO: 36.5 % (ref 40–54)
HGB BLD-MCNC: 12.2 G/DL (ref 14–18)
MCH RBC QN AUTO: 33.5 PG (ref 27–34)
MCHC RBC AUTO-ENTMCNC: 33.4 G/DL (ref 32–36)
MCV RBC AUTO: 100 FL (ref 80–100)
PLATELET # BLD AUTO: 110 THOU/UL (ref 140–440)
PMV BLD AUTO: 9.5 FL (ref 7–10)
POTASSIUM BLD-SCNC: 4.3 MMOL/L (ref 3.5–5)
PROT SERPL-MCNC: 6.4 G/DL (ref 6–8)
RBC # BLD AUTO: 3.65 MILL/UL (ref 4.4–6.2)
SODIUM SERPL-SCNC: 141 MMOL/L (ref 136–145)
TSH SERPL DL<=0.005 MIU/L-ACNC: 1.19 UIU/ML (ref 0.3–5)
WBC: 5.3 THOU/UL (ref 4–11)

## 2019-08-09 RX ORDER — MEMANTINE HYDROCHLORIDE 5 MG/1
5 TABLET ORAL AT BEDTIME
Refills: 1 | Status: SHIPPED | COMMUNITY
Start: 2019-07-31

## 2019-08-12 ENCOUNTER — COMMUNICATION - HEALTHEAST (OUTPATIENT)
Dept: FAMILY MEDICINE | Facility: CLINIC | Age: 84
End: 2019-08-12

## 2019-08-14 ENCOUNTER — OFFICE VISIT - HEALTHEAST (OUTPATIENT)
Dept: PULMONOLOGY | Facility: OTHER | Age: 84
End: 2019-08-14

## 2019-08-14 DIAGNOSIS — R06.09 DYSPNEA ON EXERTION: ICD-10-CM

## 2019-08-14 DIAGNOSIS — J44.9 CHRONIC OBSTRUCTIVE PULMONARY DISEASE, UNSPECIFIED COPD TYPE (H): ICD-10-CM

## 2019-08-14 DIAGNOSIS — R05.9 COUGH: ICD-10-CM

## 2019-08-20 ENCOUNTER — COMMUNICATION - HEALTHEAST (OUTPATIENT)
Dept: FAMILY MEDICINE | Facility: CLINIC | Age: 84
End: 2019-08-20

## 2019-08-29 ENCOUNTER — OFFICE VISIT - HEALTHEAST (OUTPATIENT)
Dept: FAMILY MEDICINE | Facility: CLINIC | Age: 84
End: 2019-08-29

## 2019-08-29 DIAGNOSIS — I50.9 CONGESTIVE HEART FAILURE, UNSPECIFIED HF CHRONICITY, UNSPECIFIED HEART FAILURE TYPE (H): ICD-10-CM

## 2019-08-29 DIAGNOSIS — E11.9 TYPE 2 DIABETES MELLITUS WITHOUT COMPLICATION, WITHOUT LONG-TERM CURRENT USE OF INSULIN (H): ICD-10-CM

## 2019-08-29 DIAGNOSIS — J44.9 CHRONIC OBSTRUCTIVE PULMONARY DISEASE, UNSPECIFIED COPD TYPE (H): ICD-10-CM

## 2019-08-29 LAB
ANION GAP SERPL CALCULATED.3IONS-SCNC: 10 MMOL/L (ref 5–18)
BUN SERPL-MCNC: 24 MG/DL (ref 8–28)
CALCIUM SERPL-MCNC: 8.8 MG/DL (ref 8.5–10.5)
CHLORIDE BLD-SCNC: 103 MMOL/L (ref 98–107)
CO2 SERPL-SCNC: 30 MMOL/L (ref 22–31)
CREAT SERPL-MCNC: 1.2 MG/DL (ref 0.7–1.3)
GFR SERPL CREATININE-BSD FRML MDRD: 57 ML/MIN/1.73M2
GLUCOSE BLD-MCNC: 113 MG/DL (ref 70–125)
HBA1C MFR BLD: 6.1 % (ref 3.5–6)
POTASSIUM BLD-SCNC: 4.5 MMOL/L (ref 3.5–5)
SODIUM SERPL-SCNC: 143 MMOL/L (ref 136–145)

## 2019-09-03 ENCOUNTER — COMMUNICATION - HEALTHEAST (OUTPATIENT)
Dept: FAMILY MEDICINE | Facility: CLINIC | Age: 84
End: 2019-09-03

## 2019-09-08 ENCOUNTER — COMMUNICATION - HEALTHEAST (OUTPATIENT)
Dept: FAMILY MEDICINE | Facility: CLINIC | Age: 84
End: 2019-09-08

## 2019-09-08 DIAGNOSIS — R06.09 DOE (DYSPNEA ON EXERTION): ICD-10-CM

## 2019-09-16 ENCOUNTER — COMMUNICATION - HEALTHEAST (OUTPATIENT)
Dept: FAMILY MEDICINE | Facility: CLINIC | Age: 84
End: 2019-09-16

## 2019-09-23 ENCOUNTER — COMMUNICATION - HEALTHEAST (OUTPATIENT)
Dept: FAMILY MEDICINE | Facility: CLINIC | Age: 84
End: 2019-09-23

## 2019-09-24 ENCOUNTER — AMBULATORY - HEALTHEAST (OUTPATIENT)
Dept: FAMILY MEDICINE | Facility: CLINIC | Age: 84
End: 2019-09-24

## 2019-09-24 DIAGNOSIS — R06.02 SHORTNESS OF BREATH: ICD-10-CM

## 2019-09-26 ENCOUNTER — AMBULATORY - HEALTHEAST (OUTPATIENT)
Dept: PULMONOLOGY | Facility: OTHER | Age: 84
End: 2019-09-26

## 2019-09-27 ENCOUNTER — AMBULATORY - HEALTHEAST (OUTPATIENT)
Dept: FAMILY MEDICINE | Facility: CLINIC | Age: 84
End: 2019-09-27

## 2019-09-27 DIAGNOSIS — R05.9 COUGH: ICD-10-CM

## 2019-10-04 ENCOUNTER — OFFICE VISIT - HEALTHEAST (OUTPATIENT)
Dept: PULMONOLOGY | Facility: OTHER | Age: 84
End: 2019-10-04

## 2019-10-04 DIAGNOSIS — R05.3 CHRONIC COUGH: ICD-10-CM

## 2019-10-04 DIAGNOSIS — J44.9 CHRONIC OBSTRUCTIVE PULMONARY DISEASE, UNSPECIFIED COPD TYPE (H): ICD-10-CM

## 2019-10-29 ENCOUNTER — COMMUNICATION - HEALTHEAST (OUTPATIENT)
Dept: FAMILY MEDICINE | Facility: CLINIC | Age: 84
End: 2019-10-29

## 2019-10-30 ENCOUNTER — AMBULATORY - HEALTHEAST (OUTPATIENT)
Dept: FAMILY MEDICINE | Facility: CLINIC | Age: 84
End: 2019-10-30

## 2019-10-30 DIAGNOSIS — R05.9 COUGH: ICD-10-CM

## 2019-11-04 ENCOUNTER — COMMUNICATION - HEALTHEAST (OUTPATIENT)
Dept: FAMILY MEDICINE | Facility: CLINIC | Age: 84
End: 2019-11-04

## 2019-11-05 ENCOUNTER — AMBULATORY - HEALTHEAST (OUTPATIENT)
Dept: PULMONOLOGY | Facility: OTHER | Age: 84
End: 2019-11-05

## 2019-11-05 ENCOUNTER — COMMUNICATION - HEALTHEAST (OUTPATIENT)
Dept: PULMONOLOGY | Facility: OTHER | Age: 84
End: 2019-11-05

## 2019-11-05 DIAGNOSIS — J44.9 CHRONIC OBSTRUCTIVE PULMONARY DISEASE, UNSPECIFIED COPD TYPE (H): ICD-10-CM

## 2020-01-01 ENCOUNTER — AMBULATORY - HEALTHEAST (OUTPATIENT)
Dept: OTHER | Facility: CLINIC | Age: 85
End: 2020-01-01

## 2020-01-01 ENCOUNTER — COMMUNICATION - HEALTHEAST (OUTPATIENT)
Dept: NURSING | Facility: CLINIC | Age: 85
End: 2020-01-01

## 2020-01-01 ENCOUNTER — COMMUNICATION - HEALTHEAST (OUTPATIENT)
Dept: FAMILY MEDICINE | Facility: CLINIC | Age: 85
End: 2020-01-01

## 2020-01-01 ENCOUNTER — COMMUNICATION - HEALTHEAST (OUTPATIENT)
Dept: CARE COORDINATION | Facility: CLINIC | Age: 85
End: 2020-01-01

## 2020-01-01 ENCOUNTER — OFFICE VISIT - HEALTHEAST (OUTPATIENT)
Dept: CARDIOLOGY | Facility: CLINIC | Age: 85
End: 2020-01-01

## 2020-01-01 ENCOUNTER — COMMUNICATION - HEALTHEAST (OUTPATIENT)
Dept: CARDIOLOGY | Facility: CLINIC | Age: 85
End: 2020-01-01

## 2020-01-01 ENCOUNTER — AMBULATORY - HEALTHEAST (OUTPATIENT)
Dept: ULTRASOUND IMAGING | Facility: HOSPITAL | Age: 85
End: 2020-01-01

## 2020-01-01 ENCOUNTER — AMBULATORY - HEALTHEAST (OUTPATIENT)
Dept: LAB | Facility: CLINIC | Age: 85
End: 2020-01-01

## 2020-01-01 ENCOUNTER — OFFICE VISIT - HEALTHEAST (OUTPATIENT)
Dept: FAMILY MEDICINE | Facility: CLINIC | Age: 85
End: 2020-01-01

## 2020-01-01 ENCOUNTER — RECORDS - HEALTHEAST (OUTPATIENT)
Dept: ADMINISTRATIVE | Facility: OTHER | Age: 85
End: 2020-01-01

## 2020-01-01 ENCOUNTER — AMBULATORY - HEALTHEAST (OUTPATIENT)
Dept: FAMILY MEDICINE | Facility: CLINIC | Age: 85
End: 2020-01-01

## 2020-01-01 ENCOUNTER — OFFICE VISIT - HEALTHEAST (OUTPATIENT)
Dept: PULMONOLOGY | Facility: OTHER | Age: 85
End: 2020-01-01

## 2020-01-01 ENCOUNTER — AMBULATORY - HEALTHEAST (OUTPATIENT)
Dept: MEDSURG UNIT | Facility: HOSPITAL | Age: 85
End: 2020-01-01

## 2020-01-01 ENCOUNTER — HOSPITAL ENCOUNTER (OUTPATIENT)
Dept: ULTRASOUND IMAGING | Facility: CLINIC | Age: 85
Discharge: HOME | End: 2020-10-05

## 2020-01-01 ENCOUNTER — DOCUMENTATION ONLY (OUTPATIENT)
Dept: OTHER | Facility: CLINIC | Age: 85
End: 2020-01-01

## 2020-01-01 ENCOUNTER — AMBULATORY - HEALTHEAST (OUTPATIENT)
Dept: CARE COORDINATION | Facility: CLINIC | Age: 85
End: 2020-01-01

## 2020-01-01 ENCOUNTER — AMBULATORY - HEALTHEAST (OUTPATIENT)
Dept: ONCOLOGY | Facility: HOSPITAL | Age: 85
End: 2020-01-01

## 2020-01-01 ENCOUNTER — HOSPITAL ENCOUNTER (OUTPATIENT)
Dept: ULTRASOUND IMAGING | Facility: HOSPITAL | Age: 85
Discharge: HOME | End: 2020-09-23
Admitting: RADIOLOGY

## 2020-01-01 ENCOUNTER — COMMUNICATION - HEALTHEAST (OUTPATIENT)
Dept: SCHEDULING | Facility: CLINIC | Age: 85
End: 2020-01-01

## 2020-01-01 ENCOUNTER — COMMUNICATION - HEALTHEAST (OUTPATIENT)
Dept: ONCOLOGY | Facility: HOSPITAL | Age: 85
End: 2020-01-01

## 2020-01-01 ENCOUNTER — AMBULATORY - HEALTHEAST (OUTPATIENT)
Dept: CARDIOLOGY | Facility: CLINIC | Age: 85
End: 2020-01-01

## 2020-01-01 ENCOUNTER — HOSPITAL ENCOUNTER (OUTPATIENT)
Dept: ULTRASOUND IMAGING | Facility: CLINIC | Age: 85
Discharge: HOME | End: 2020-10-06
Admitting: RADIOLOGY

## 2020-01-01 ENCOUNTER — HOSPITAL ENCOUNTER (OUTPATIENT)
Dept: ULTRASOUND IMAGING | Facility: HOSPITAL | Age: 85
Discharge: HOME | End: 2020-10-14

## 2020-01-01 ENCOUNTER — HOSPITAL ENCOUNTER (OUTPATIENT)
Dept: ULTRASOUND IMAGING | Facility: HOSPITAL | Age: 85
Discharge: HOME | End: 2020-08-26
Admitting: RADIOLOGY

## 2020-01-01 ENCOUNTER — AMBULATORY - HEALTHEAST (OUTPATIENT)
Dept: SCHEDULING | Facility: CLINIC | Age: 85
End: 2020-01-01

## 2020-01-01 ENCOUNTER — COMMUNICATION - HEALTHEAST (OUTPATIENT)
Dept: PULMONOLOGY | Facility: OTHER | Age: 85
End: 2020-01-01

## 2020-01-01 ENCOUNTER — AMBULATORY - HEALTHEAST (OUTPATIENT)
Dept: PHARMACY | Facility: CLINIC | Age: 85
End: 2020-01-01

## 2020-01-01 ENCOUNTER — OFFICE VISIT - HEALTHEAST (OUTPATIENT)
Dept: ONCOLOGY | Facility: HOSPITAL | Age: 85
End: 2020-01-01

## 2020-01-01 ENCOUNTER — HOSPITAL ENCOUNTER (OUTPATIENT)
Dept: ULTRASOUND IMAGING | Facility: HOSPITAL | Age: 85
Discharge: HOME | End: 2020-09-09

## 2020-01-01 ENCOUNTER — RECORDS - HEALTHEAST (OUTPATIENT)
Dept: PULMONOLOGY | Facility: OTHER | Age: 85
End: 2020-01-01

## 2020-01-01 ENCOUNTER — DOCUMENTATION ONLY (OUTPATIENT)
Facility: CLINIC | Age: 85
End: 2020-01-01

## 2020-01-01 ENCOUNTER — HOSPITAL ENCOUNTER (OUTPATIENT)
Dept: ULTRASOUND IMAGING | Facility: HOSPITAL | Age: 85
Discharge: HOME | End: 2020-09-09
Admitting: RADIOLOGY

## 2020-01-01 ENCOUNTER — AMBULATORY - HEALTHEAST (OUTPATIENT)
Dept: PULMONOLOGY | Facility: OTHER | Age: 85
End: 2020-01-01

## 2020-01-01 ENCOUNTER — HOSPITAL ENCOUNTER (OUTPATIENT)
Dept: ULTRASOUND IMAGING | Facility: HOSPITAL | Age: 85
Discharge: HOME | End: 2020-08-17
Attending: INTERNAL MEDICINE | Admitting: RADIOLOGY

## 2020-01-01 ENCOUNTER — HOSPITAL ENCOUNTER (OUTPATIENT)
Dept: ULTRASOUND IMAGING | Facility: HOSPITAL | Age: 85
Discharge: HOME | End: 2020-07-16
Admitting: RADIOLOGY

## 2020-01-01 ENCOUNTER — HOSPITAL ENCOUNTER (OUTPATIENT)
Dept: ULTRASOUND IMAGING | Facility: HOSPITAL | Age: 85
Discharge: HOME | End: 2020-11-03
Admitting: RADIOLOGY

## 2020-01-01 ENCOUNTER — HOSPITAL ENCOUNTER (OUTPATIENT)
Dept: ULTRASOUND IMAGING | Facility: HOSPITAL | Age: 85
Discharge: HOME | End: 2020-07-27
Admitting: RADIOLOGY

## 2020-01-01 ENCOUNTER — COMMUNICATION - HEALTHEAST (OUTPATIENT)
Dept: EMERGENCY MEDICINE | Facility: HOSPITAL | Age: 85
End: 2020-01-01

## 2020-01-01 DIAGNOSIS — R18.8 OTHER ASCITES: ICD-10-CM

## 2020-01-01 DIAGNOSIS — I10 ESSENTIAL HYPERTENSION: ICD-10-CM

## 2020-01-01 DIAGNOSIS — I48.19 PERSISTENT ATRIAL FIBRILLATION (H): ICD-10-CM

## 2020-01-01 DIAGNOSIS — D69.6 THROMBOCYTOPENIA (H): ICD-10-CM

## 2020-01-01 DIAGNOSIS — I48.20 CHRONIC ATRIAL FIBRILLATION (H): ICD-10-CM

## 2020-01-01 DIAGNOSIS — R18.8 ABDOMINAL ASCITES: ICD-10-CM

## 2020-01-01 DIAGNOSIS — I50.9 CONGESTIVE HEART FAILURE, UNSPECIFIED HF CHRONICITY, UNSPECIFIED HEART FAILURE TYPE (H): ICD-10-CM

## 2020-01-01 DIAGNOSIS — Z11.59 ENCOUNTER FOR SCREENING FOR OTHER VIRAL DISEASES: ICD-10-CM

## 2020-01-01 DIAGNOSIS — R53.81 PHYSICAL DECONDITIONING: ICD-10-CM

## 2020-01-01 DIAGNOSIS — E83.42 HYPOMAGNESEMIA: ICD-10-CM

## 2020-01-01 DIAGNOSIS — Z98.890 S/P ABDOMINAL PARACENTESIS: ICD-10-CM

## 2020-01-01 DIAGNOSIS — I11.0 HYPERTENSIVE HEART DISEASE WITH HEART FAILURE (H): ICD-10-CM

## 2020-01-01 DIAGNOSIS — R06.09 DOE (DYSPNEA ON EXERTION): ICD-10-CM

## 2020-01-01 DIAGNOSIS — I50.30 NYHA CLASS 3 HEART FAILURE WITH PRESERVED EJECTION FRACTION (H): ICD-10-CM

## 2020-01-01 DIAGNOSIS — R60.9 EDEMA, UNSPECIFIED TYPE: ICD-10-CM

## 2020-01-01 DIAGNOSIS — R06.02 SHORTNESS OF BREATH: ICD-10-CM

## 2020-01-01 DIAGNOSIS — D64.9 LOW HEMOGLOBIN: ICD-10-CM

## 2020-01-01 DIAGNOSIS — R41.3 MEMORY IMPAIRMENT: ICD-10-CM

## 2020-01-01 DIAGNOSIS — K74.60 CIRRHOSIS (H): ICD-10-CM

## 2020-01-01 DIAGNOSIS — J18.9 PNEUMONIA OF BOTH LUNGS DUE TO INFECTIOUS ORGANISM, UNSPECIFIED PART OF LUNG: ICD-10-CM

## 2020-01-01 DIAGNOSIS — Z20.822 ENCOUNTER FOR LABORATORY TESTING FOR COVID-19 VIRUS: ICD-10-CM

## 2020-01-01 DIAGNOSIS — I50.9 CHF (CONGESTIVE HEART FAILURE) (H): ICD-10-CM

## 2020-01-01 DIAGNOSIS — I48.91 ATRIAL FIBRILLATION, UNSPECIFIED TYPE (H): ICD-10-CM

## 2020-01-01 DIAGNOSIS — E11.9 TYPE 2 DIABETES MELLITUS WITHOUT COMPLICATION, WITHOUT LONG-TERM CURRENT USE OF INSULIN (H): ICD-10-CM

## 2020-01-01 DIAGNOSIS — N52.9 ERECTILE DYSFUNCTION, UNSPECIFIED ERECTILE DYSFUNCTION TYPE: ICD-10-CM

## 2020-01-01 DIAGNOSIS — R09.02 HYPOXIA: ICD-10-CM

## 2020-01-01 DIAGNOSIS — Z00.00 HEALTH CARE MAINTENANCE: ICD-10-CM

## 2020-01-01 DIAGNOSIS — R60.0 BILATERAL LOWER EXTREMITY EDEMA: ICD-10-CM

## 2020-01-01 DIAGNOSIS — Z86.79: ICD-10-CM

## 2020-01-01 DIAGNOSIS — R06.09 DYSPNEA ON EXERTION: ICD-10-CM

## 2020-01-01 DIAGNOSIS — J18.9 COMMUNITY ACQUIRED PNEUMONIA OF BOTH LUNGS: ICD-10-CM

## 2020-01-01 DIAGNOSIS — J44.9 SEVERE CHRONIC OBSTRUCTIVE PULMONARY DISEASE (H): ICD-10-CM

## 2020-01-01 DIAGNOSIS — Z09 HOSPITAL DISCHARGE FOLLOW-UP: ICD-10-CM

## 2020-01-01 DIAGNOSIS — D64.9 ANEMIA, UNSPECIFIED TYPE: ICD-10-CM

## 2020-01-01 DIAGNOSIS — Z71.89 ENCOUNTER FOR HERB AND VITAMIN SUPPLEMENT MANAGEMENT: ICD-10-CM

## 2020-01-01 DIAGNOSIS — J44.9 CHRONIC OBSTRUCTIVE PULMONARY DISEASE, UNSPECIFIED COPD TYPE (H): ICD-10-CM

## 2020-01-01 DIAGNOSIS — J44.0 CHRONIC OBSTRUCTIVE PULMONARY DISEASE WITH ACUTE LOWER RESPIRATORY INFECTION (H): ICD-10-CM

## 2020-01-01 DIAGNOSIS — G47.10 HYPERSOMNIA: ICD-10-CM

## 2020-01-01 DIAGNOSIS — I48.91 ATRIAL FIBRILLATION (H): ICD-10-CM

## 2020-01-01 DIAGNOSIS — Z99.81 DEPENDENCE ON NOCTURNAL OXYGEN THERAPY: ICD-10-CM

## 2020-01-01 DIAGNOSIS — E03.9 HYPOTHYROIDISM, UNSPECIFIED TYPE: ICD-10-CM

## 2020-01-01 DIAGNOSIS — E03.9 HYPOTHYROIDISM: ICD-10-CM

## 2020-01-01 DIAGNOSIS — R63.4 WEIGHT LOSS: ICD-10-CM

## 2020-01-01 DIAGNOSIS — I50.30 HEART FAILURE WITH PRESERVED EJECTION FRACTION, NYHA CLASS IV (H): ICD-10-CM

## 2020-01-01 DIAGNOSIS — E11.9 DIABETES MELLITUS TYPE II, NON INSULIN DEPENDENT (H): ICD-10-CM

## 2020-01-01 DIAGNOSIS — I50.32 CHRONIC DIASTOLIC HEART FAILURE (H): ICD-10-CM

## 2020-01-01 DIAGNOSIS — M62.81 MUSCLE WEAKNESS (GENERALIZED): ICD-10-CM

## 2020-01-01 DIAGNOSIS — Z01.818 PRE-OP EXAM: ICD-10-CM

## 2020-01-01 LAB
ALBUMIN FLD-MCNC: <0.6 G/DL
ALBUMIN FLD-MCNC: <0.6 G/DL
ALBUMIN SERPL-MCNC: 2.1 G/DL (ref 3.5–5)
ALBUMIN SERPL-MCNC: 2.1 G/DL (ref 3.5–5)
ALBUMIN SERPL-MCNC: 2.6 G/DL (ref 3.5–5)
ALP SERPL-CCNC: 124 U/L (ref 45–120)
ALP SERPL-CCNC: 145 U/L (ref 45–120)
ALP SERPL-CCNC: 158 U/L (ref 45–120)
ALT SERPL W P-5'-P-CCNC: 29 U/L (ref 0–45)
ALT SERPL W P-5'-P-CCNC: 31 U/L (ref 0–45)
ALT SERPL W P-5'-P-CCNC: 32 U/L (ref 0–45)
ANION GAP SERPL CALCULATED.3IONS-SCNC: 10 MMOL/L (ref 5–18)
ANION GAP SERPL CALCULATED.3IONS-SCNC: 10 MMOL/L (ref 5–18)
ANION GAP SERPL CALCULATED.3IONS-SCNC: 11 MMOL/L (ref 5–18)
ANION GAP SERPL CALCULATED.3IONS-SCNC: 5 MMOL/L (ref 5–18)
ANION GAP SERPL CALCULATED.3IONS-SCNC: 7 MMOL/L (ref 5–18)
ANION GAP SERPL CALCULATED.3IONS-SCNC: 8 MMOL/L (ref 5–18)
ANION GAP SERPL CALCULATED.3IONS-SCNC: 9 MMOL/L (ref 5–18)
ANION GAP SERPL CALCULATED.3IONS-SCNC: 9 MMOL/L (ref 5–18)
APPEARANCE FLD: CLEAR
APPEARANCE FLD: CLEAR
AST SERPL W P-5'-P-CCNC: 34 U/L (ref 0–40)
AST SERPL W P-5'-P-CCNC: 34 U/L (ref 0–40)
AST SERPL W P-5'-P-CCNC: 37 U/L (ref 0–40)
BACTERIA SPEC CULT: NO GROWTH
BACTERIA SPEC CULT: NORMAL
BASOPHILS # BLD AUTO: 0 THOU/UL (ref 0–0.2)
BASOPHILS NFR BLD AUTO: 0 % (ref 0–2)
BASOPHILS NFR BLD AUTO: 0 % (ref 0–2)
BASOPHILS NFR BLD AUTO: 1 % (ref 0–2)
BILIRUB SERPL-MCNC: 0.6 MG/DL (ref 0–1)
BILIRUB SERPL-MCNC: 0.7 MG/DL (ref 0–1)
BILIRUB SERPL-MCNC: 0.7 MG/DL (ref 0–1)
BNP SERPL-MCNC: 153 PG/ML (ref 0–93)
BNP SERPL-MCNC: 99 PG/ML (ref 0–93)
BUN SERPL-MCNC: 19 MG/DL (ref 8–28)
BUN SERPL-MCNC: 24 MG/DL (ref 8–28)
BUN SERPL-MCNC: 25 MG/DL (ref 8–28)
BUN SERPL-MCNC: 25 MG/DL (ref 8–28)
BUN SERPL-MCNC: 43 MG/DL (ref 8–28)
BUN SERPL-MCNC: 44 MG/DL (ref 8–28)
BUN SERPL-MCNC: 55 MG/DL (ref 8–28)
BUN SERPL-MCNC: 57 MG/DL (ref 8–28)
CALCIUM SERPL-MCNC: 8.2 MG/DL (ref 8.5–10.5)
CALCIUM SERPL-MCNC: 8.3 MG/DL (ref 8.5–10.5)
CALCIUM SERPL-MCNC: 8.3 MG/DL (ref 8.5–10.5)
CALCIUM SERPL-MCNC: 8.4 MG/DL (ref 8.5–10.5)
CALCIUM SERPL-MCNC: 8.4 MG/DL (ref 8.5–10.5)
CALCIUM SERPL-MCNC: 8.5 MG/DL (ref 8.5–10.5)
CALCIUM SERPL-MCNC: 8.5 MG/DL (ref 8.5–10.5)
CALCIUM SERPL-MCNC: 8.7 MG/DL (ref 8.5–10.5)
CHLORIDE BLD-SCNC: 100 MMOL/L (ref 98–107)
CHLORIDE BLD-SCNC: 95 MMOL/L (ref 98–107)
CHLORIDE BLD-SCNC: 96 MMOL/L (ref 98–107)
CHLORIDE BLD-SCNC: 97 MMOL/L (ref 98–107)
CHLORIDE BLD-SCNC: 99 MMOL/L (ref 98–107)
CO2 SERPL-SCNC: 29 MMOL/L (ref 22–31)
CO2 SERPL-SCNC: 29 MMOL/L (ref 22–31)
CO2 SERPL-SCNC: 30 MMOL/L (ref 22–31)
CO2 SERPL-SCNC: 32 MMOL/L (ref 22–31)
CO2 SERPL-SCNC: 33 MMOL/L (ref 22–31)
CO2 SERPL-SCNC: 33 MMOL/L (ref 22–31)
CO2 SERPL-SCNC: 34 MMOL/L (ref 22–31)
CO2 SERPL-SCNC: 35 MMOL/L (ref 22–31)
COLOR FLD: YELLOW
COLOR FLD: YELLOW
CREAT SERPL-MCNC: 0.99 MG/DL (ref 0.7–1.3)
CREAT SERPL-MCNC: 1.2 MG/DL (ref 0.7–1.3)
CREAT SERPL-MCNC: 1.21 MG/DL (ref 0.7–1.3)
CREAT SERPL-MCNC: 1.31 MG/DL (ref 0.7–1.3)
CREAT SERPL-MCNC: 1.91 MG/DL (ref 0.7–1.3)
CREAT SERPL-MCNC: 2 MG/DL (ref 0.7–1.3)
CREAT SERPL-MCNC: 2.25 MG/DL (ref 0.7–1.3)
CREAT SERPL-MCNC: 2.4 MG/DL (ref 0.7–1.3)
EOSINOPHIL # BLD AUTO: 0.1 THOU/UL (ref 0–0.4)
EOSINOPHIL # BLD AUTO: 0.1 THOU/UL (ref 0–0.4)
EOSINOPHIL COUNT (ABSOLUTE): 0.1 THOU/UL (ref 0–0.4)
EOSINOPHIL NFR BLD AUTO: 1 % (ref 0–6)
EOSINOPHIL NFR FLD MANUAL: ABNORMAL %
EOSINOPHIL NFR FLD MANUAL: NORMAL %
ERYTHROCYTE [DISTWIDTH] IN BLOOD BY AUTOMATED COUNT: 13.1 % (ref 11–14.5)
ERYTHROCYTE [DISTWIDTH] IN BLOOD BY AUTOMATED COUNT: 13.6 % (ref 11–14.5)
ERYTHROCYTE [DISTWIDTH] IN BLOOD BY AUTOMATED COUNT: 17.9 % (ref 11–14.5)
ERYTHROCYTE [DISTWIDTH] IN BLOOD BY AUTOMATED COUNT: 18.1 % (ref 11–14.5)
FERRITIN SERPL-MCNC: 71 NG/ML (ref 27–300)
GFR SERPL CREATININE-BSD FRML MDRD: 26 ML/MIN/1.73M2
GFR SERPL CREATININE-BSD FRML MDRD: 28 ML/MIN/1.73M2
GFR SERPL CREATININE-BSD FRML MDRD: 32 ML/MIN/1.73M2
GFR SERPL CREATININE-BSD FRML MDRD: 33 ML/MIN/1.73M2
GFR SERPL CREATININE-BSD FRML MDRD: 52 ML/MIN/1.73M2
GFR SERPL CREATININE-BSD FRML MDRD: 57 ML/MIN/1.73M2
GFR SERPL CREATININE-BSD FRML MDRD: 57 ML/MIN/1.73M2
GFR SERPL CREATININE-BSD FRML MDRD: >60 ML/MIN/1.73M2
GLUCOSE BLD-MCNC: 100 MG/DL (ref 70–125)
GLUCOSE BLD-MCNC: 105 MG/DL (ref 70–125)
GLUCOSE BLD-MCNC: 112 MG/DL (ref 70–125)
GLUCOSE BLD-MCNC: 122 MG/DL (ref 70–125)
GLUCOSE BLD-MCNC: 134 MG/DL (ref 70–125)
GLUCOSE BLD-MCNC: 160 MG/DL (ref 70–125)
GLUCOSE BLD-MCNC: 200 MG/DL (ref 70–125)
GLUCOSE BLD-MCNC: 92 MG/DL (ref 70–125)
GRAM STAIN RESULT: NORMAL
GRAM STAIN RESULT: NORMAL
HBA1C MFR BLD: 5.8 % (ref 3.5–6)
HCT VFR BLD AUTO: 34 % (ref 40–54)
HCT VFR BLD AUTO: 34.2 % (ref 40–54)
HCT VFR BLD AUTO: 36.5 % (ref 40–54)
HCT VFR BLD AUTO: 37.6 % (ref 40–54)
HGB BLD-MCNC: 11.3 G/DL (ref 14–18)
HGB BLD-MCNC: 11.5 G/DL (ref 14–18)
HGB BLD-MCNC: 12.3 G/DL (ref 14–18)
HGB BLD-MCNC: 12.8 G/DL (ref 14–18)
IRON SATN MFR SERPL: 52 % (ref 20–50)
IRON SERPL-MCNC: 92 UG/DL (ref 42–175)
LYMPHOCYTES # BLD AUTO: 1 THOU/UL (ref 0.8–4.4)
LYMPHOCYTES # BLD AUTO: 1.1 THOU/UL (ref 0.8–4.4)
LYMPHOCYTES # BLD AUTO: 1.1 THOU/UL (ref 0.8–4.4)
LYMPHOCYTES NFR BLD AUTO: 17 % (ref 20–40)
LYMPHOCYTES NFR BLD AUTO: 18 % (ref 20–40)
LYMPHOCYTES NFR BLD AUTO: 19 % (ref 20–40)
LYMPHOCYTES NFR FLD MANUAL: 3 %
LYMPHOCYTES NFR FLD MANUAL: 7 %
MACROPHAGE % - HISTORICAL: 12 %
MACROPHAGE % - HISTORICAL: 92 %
MAGNESIUM SERPL-MCNC: 1.5 MG/DL (ref 1.8–2.6)
MAGNESIUM SERPL-MCNC: 1.7 MG/DL (ref 1.8–2.6)
MAGNESIUM SERPL-MCNC: 2 MG/DL (ref 1.8–2.6)
MCH RBC QN AUTO: 34.4 PG (ref 27–34)
MCH RBC QN AUTO: 34.5 PG (ref 27–34)
MCH RBC QN AUTO: 34.6 PG (ref 27–34)
MCH RBC QN AUTO: 34.8 PG (ref 27–34)
MCHC RBC AUTO-ENTMCNC: 33 G/DL (ref 32–36)
MCHC RBC AUTO-ENTMCNC: 33.8 G/DL (ref 32–36)
MCHC RBC AUTO-ENTMCNC: 33.9 G/DL (ref 32–36)
MCHC RBC AUTO-ENTMCNC: 34.1 G/DL (ref 32–36)
MCV RBC AUTO: 101 FL (ref 80–100)
MCV RBC AUTO: 102 FL (ref 80–100)
MCV RBC AUTO: 103 FL (ref 80–100)
MCV RBC AUTO: 104 FL (ref 80–100)
MESOTHELIALS, FLUID: 1 %
MESOTHELIALS, FLUID: 1 %
MONOCYTE % - HISTORICAL: 62 %
MONOCYTE % - HISTORICAL: ABNORMAL
MONOCYTES # BLD AUTO: 0.8 THOU/UL (ref 0–0.9)
MONOCYTES # BLD AUTO: 1 THOU/UL (ref 0–0.9)
MONOCYTES # BLD AUTO: 1 THOU/UL (ref 0–0.9)
MONOCYTES NFR BLD AUTO: 13 % (ref 2–10)
MONOCYTES NFR BLD AUTO: 15 % (ref 2–10)
MONOCYTES NFR BLD AUTO: 18 % (ref 2–10)
NEUTROPHILS # BLD AUTO: 3.4 THOU/UL (ref 2–7.7)
NEUTROPHILS # BLD AUTO: 4.2 THOU/UL (ref 2–7.7)
NEUTROPHILS NFR BLD AUTO: 60 % (ref 50–70)
NEUTROPHILS NFR BLD AUTO: 65 % (ref 50–70)
NEUTS BAND NFR FLD MANUAL: 18 %
NEUTS BAND NFR FLD MANUAL: 6 %
OTHER CELLS FLD MANUAL: ABNORMAL
OTHER CELLS FLD MANUAL: NORMAL
OVALOCYTES: ABNORMAL
PLAT MORPH BLD: ABNORMAL
PLATELET # BLD AUTO: 105 THOU/UL (ref 140–440)
PLATELET # BLD AUTO: 119 THOU/UL (ref 140–440)
PLATELET # BLD AUTO: 119 THOU/UL (ref 140–440)
PLATELET # BLD AUTO: 148 THOU/UL (ref 140–440)
PMV BLD AUTO: 13.3 FL (ref 8.5–12.5)
PMV BLD AUTO: 13.6 FL (ref 8.5–12.5)
PMV BLD AUTO: 8.7 FL (ref 7–10)
PMV BLD AUTO: 9.6 FL (ref 7–10)
POTASSIUM BLD-SCNC: 2.9 MMOL/L (ref 3.5–5)
POTASSIUM BLD-SCNC: 3.4 MMOL/L (ref 3.5–5)
POTASSIUM BLD-SCNC: 4.1 MMOL/L (ref 3.5–5)
POTASSIUM BLD-SCNC: 4.2 MMOL/L (ref 3.5–5)
POTASSIUM BLD-SCNC: 4.5 MMOL/L (ref 3.5–5)
POTASSIUM BLD-SCNC: 4.7 MMOL/L (ref 3.5–5)
PROT FLD-MCNC: 1.3 G/DL
PROT FLD-MCNC: 1.4 G/DL
PROT SERPL-MCNC: 6.8 G/DL (ref 6–8)
PROT SERPL-MCNC: 6.8 G/DL (ref 6–8)
PROT SERPL-MCNC: 7.3 G/DL (ref 6–8)
RBC # BLD AUTO: 3.28 MILL/UL (ref 4.4–6.2)
RBC # BLD AUTO: 3.3 MILL/UL (ref 4.4–6.2)
RBC # BLD AUTO: 3.58 MILL/UL (ref 4.4–6.2)
RBC # BLD AUTO: 3.7 MILL/UL (ref 4.4–6.2)
RBC FLUID - HISTORICAL: ABNORMAL /UL
RBC FLUID - HISTORICAL: NORMAL /UL
RETICS # AUTO: 0.04 MILL/UL (ref 0.01–0.11)
RETICS/RBC NFR AUTO: 1.2 % (ref 0.8–2.7)
SARS-COV-2 PCR COMMENT: NORMAL
SARS-COV-2 RNA SPEC QL NAA+PROBE: NEGATIVE
SARS-COV-2 VIRUS SPECIMEN SOURCE: NORMAL
SCHISTOCYTES: ABNORMAL
SODIUM SERPL-SCNC: 134 MMOL/L (ref 136–145)
SODIUM SERPL-SCNC: 135 MMOL/L (ref 136–145)
SODIUM SERPL-SCNC: 135 MMOL/L (ref 136–145)
SODIUM SERPL-SCNC: 137 MMOL/L (ref 136–145)
SODIUM SERPL-SCNC: 137 MMOL/L (ref 136–145)
SODIUM SERPL-SCNC: 138 MMOL/L (ref 136–145)
SODIUM SERPL-SCNC: 139 MMOL/L (ref 136–145)
SODIUM SERPL-SCNC: 141 MMOL/L (ref 136–145)
TIBC SERPL-MCNC: 178 UG/DL (ref 313–563)
TOTAL NEUTROPHILS-ABS(DIFF): 4 THOU/UL (ref 2–7.7)
TOTAL NEUTROPHILS-REL(DIFF): 69 % (ref 50–70)
TRANSFERRIN SERPL-MCNC: 142 MG/DL (ref 212–360)
TSH SERPL DL<=0.005 MIU/L-ACNC: 2.44 UIU/ML (ref 0.3–5)
VIT B12 SERPL-MCNC: 397 PG/ML (ref 213–816)
WBC # FLD AUTO: 231 /UL (ref 0–99)
WBC # FLD AUTO: 7 /UL (ref 0–99)
WBC: 4.9 THOU/UL (ref 4–11)
WBC: 5.6 THOU/UL (ref 4–11)
WBC: 5.8 THOU/UL (ref 4–11)
WBC: 6.4 THOU/UL (ref 4–11)

## 2020-01-01 RX ORDER — MIDODRINE HYDROCHLORIDE 10 MG/1
10 TABLET ORAL
Status: SHIPPED | COMMUNITY
Start: 2020-01-01

## 2020-01-01 RX ORDER — BUMETANIDE 1 MG/1
TABLET ORAL
Qty: 60 TABLET | Refills: 11 | Status: SHIPPED | OUTPATIENT
Start: 2020-01-01

## 2020-01-01 RX ORDER — MAGNESIUM OXIDE 400 MG/1
400 TABLET ORAL 2 TIMES DAILY
Qty: 200 TABLET | Refills: 1 | Status: SHIPPED
Start: 2020-01-01

## 2020-01-01 RX ORDER — LEVOTHYROXINE SODIUM 175 UG/1
TABLET ORAL
Qty: 90 TABLET | Refills: 0 | Status: SHIPPED | OUTPATIENT
Start: 2020-01-01

## 2020-01-01 RX ORDER — ALBUTEROL SULFATE 90 UG/1
2 AEROSOL, METERED RESPIRATORY (INHALATION) EVERY 4 HOURS PRN
Qty: 1 INHALER | Refills: 11 | Status: SHIPPED | OUTPATIENT
Start: 2020-01-01

## 2020-01-01 RX ORDER — SOTALOL HYDROCHLORIDE 80 MG/1
80 TABLET ORAL 2 TIMES DAILY
Qty: 180 TABLET | Refills: 3 | Status: SHIPPED | OUTPATIENT
Start: 2020-01-01

## 2020-01-01 ASSESSMENT — MIFFLIN-ST. JEOR
SCORE: 1497.72
SCORE: 1553.97
SCORE: 1441.03
SCORE: 1429.81
SCORE: 1468.24
SCORE: 1506.79
SCORE: 1447.83

## 2020-01-01 ASSESSMENT — PATIENT HEALTH QUESTIONNAIRE - PHQ9: SUM OF ALL RESPONSES TO PHQ QUESTIONS 1-9: 6

## 2020-06-29 NOTE — ASSESSMENT & PLAN NOTE
Hospital follow-up visit.  This patient today expresses frustration at the lack of improvement that he realized while he was hospitalized late last week.  He says that he feels basically the same today as he did prior to going into the hospital.  He refutes his diagnosis of heart failure and is frustrated because he believes that his treatment team has not explained was going on.  He and I reviewed the notes from 2020 in which the hospital team is said that he has class III heart failure with preserved ejection fraction.  He also has COPD and is on oxygen at night aswell as unexplained anemia.  We discussed that there is multiple factors in his health history that may resulted for his poor stamina.  Lastly, we reviewed that he had pneumonia in the month of January.  After reviewing his symptoms, the patient believes that his decrease in stamina actually started back when he had pneumonia.  - The patient has an understanding of his medications.  He is taking them as prescribed.  -His weights have been stable since discharge.  He is measuring his weight daily.  -I recommended the patientfollow-up with his cardiologist and ask whether or not his heart failure is accounting for his decreased stamina as suggested by his NYHA class III heart failure classification.  - COPD?  The patient is tolerant and adherent to the recommendation to use Advair.  At one point he was prescribed to tropia which is no longerin his chart.  He has not tried albuterol for quite some time.  I suggested he try albuterol to see if it helps with his breathing.  -Lastly, we reviewed the need for a recheck of his hemoglobin.  When he was hospitalized, his hemoglobin is actually stable.

## 2020-11-18 NOTE — PROGRESS NOTES
Hospice physician visit  Location: Due to the COVID-19 pandemic, the patient is seen via video visit using vogogo technology.  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  He was admitted to hospice on 11/4/20.  His wife asked for a visit today because he is condition has declined.  He is scheduled for placement of a Pleurx drain later this week and she wonders if he is strong enough to complete this activity.  Currently he is bedbound, total care.  At his last paracentesis 6 L was removed and he had to be carried from the car into the hospital for the procedure.He is not talking due to taxing effort with this.  He needs to be fed.  He is short of breath with all activity.  His wife thinks he is comfortable at rest.  He does complain of abdominal pain with moving and looks dyspneic.  They have given hydromorphone 0.5 mg once but made him very sleepy and they have not used it since that time.  He uses oxygen intermittently.  She wonders if he needs to take all of his current medications.  PMH: Hypothyroidism and type 2 diabetes.  PSH: Unknown social history: Lives with wife who is his primary caregiver.  Never smoker.  12 point review systems: Patient has a Ruffin catheter.  He has bruising on his arms and legs.  Patient is unable to answer questions review systems due to fatigue and shortness of breath.  Objective  The physical exam was done by hospice nurse and observed by me.  Vital signs: O2 sats 80% on room air, blood pressure 80/52  Thin, frail, cachectic man with poor coloring lying in hospital bed with very distended abdomen  Chest: Right lower and mid lobe crackles, clear to auscultation anteriorly and in the upper lobes.  Heart: Irregular and tachycardic.  Abdomen: Distended, bowel sounds present all  4 quadrants, soft on the right with firmness on the left.  Nontender.  Extremities: 3+ pitting edema.  Skin: Ecchymoses on the extensor surfaces of the arms and shins.  Neurologic: Arousable, answers brief questions but dyspneic.    Assessment: The patient is an 87-year-old man with advanced heart and liver disease, critically ill.  His prognosis is poor, expected days to 1 week.    Plan: Shared prognosis with patient's wife with her permission.  Reassured her that I do not feel that he would benefit from a paracentesis at this time.  She agrees that his prognosis is poor.  Stopped multiple medications including multivitamin, magnesium, vitamin D3, Namenda, Metformin.  Reassured her that when he can no longer swallow can stop other noncomfort medications.  She prefers to use medications as needed instead of scheduling medications for comfort.  Blanche Proctor MD  Medical Director  [facility]

## 2021-05-26 VITALS — HEART RATE: 69 BPM | DIASTOLIC BLOOD PRESSURE: 70 MMHG | SYSTOLIC BLOOD PRESSURE: 97 MMHG

## 2021-05-27 VITALS
SYSTOLIC BLOOD PRESSURE: 131 MMHG | TEMPERATURE: 98.2 F | OXYGEN SATURATION: 82 % | RESPIRATION RATE: 16 BRPM | HEART RATE: 75 BPM | DIASTOLIC BLOOD PRESSURE: 68 MMHG

## 2021-05-27 ASSESSMENT — PATIENT HEALTH QUESTIONNAIRE - PHQ9: SUM OF ALL RESPONSES TO PHQ QUESTIONS 1-9: 6

## 2021-05-27 NOTE — TELEPHONE ENCOUNTER
Pt was called with results per Ashley, pt understands and agrees to plan and has my direct number if needed.

## 2021-05-27 NOTE — TELEPHONE ENCOUNTER
----- Message from Ashley Hutchinson CNP sent at 4/1/2019  1:52 PM CDT -----  Monitor confirms that he is having episodes of A fib.  As discussed in clinic, increase flecainide to 150 mg twice daily.  Rx sent to pharmacy.  Keep a log of A fib episodes.  Follow up as scheduled.

## 2021-05-28 NOTE — PROGRESS NOTES
Oxygen saturation walk test    Patient oxygen saturation on RA at rest is 96%.  Oxygen saturation after ambulating 300ft on RA is 92%.  Patient is ambulatory within his/her home.

## 2021-05-28 NOTE — TELEPHONE ENCOUNTER
Called patient who agreed to have echocardiogram done.  He still doesn't know if he wants a PVI, I informed patient we will wait for echo results and then call him regarding Dr. Gutierrez's recommendations.    Pt agreed to plan.    Maira: Order for echo complete in chart. Please call patient to schedule echo complete.     Thank you,    Kayley

## 2021-05-28 NOTE — PATIENT INSTRUCTIONS - HE
Z-pack x5 days    Sleep test and clinic eval    Consider exercise rehab to increase exercise tolerance    Stop ranitidine and Spiriva    Return to clinic in 3 months

## 2021-05-28 NOTE — PROGRESS NOTES
Chart reviewed regarding possible atrial fibrillation ablation.  The patient is failed one antiarrhythmic drug and is starting seconds.  The patient is a candidate for mapping and ablation of his arrhythmia.  He would need both pulmonary vein isolation and right atrial flutter ablation based on the most recent Holter monitor and previous twelve-lead EKGs.  The patient should have a twelve-lead EKG prior to upcoming cardioversion to demonstrate his atrial flutter.  In addition the patient should undergo standard echocardiographic evaluation to assess his valve status as well as evaluate his left atrium.  Following review of those 2 elements he could be scheduled if he wishes to proceed.

## 2021-05-28 NOTE — PROGRESS NOTES
Assessment and Plan:  86 yo male with history of HTN, DM, never smoker, presented to pulmonary clinic 12/21/2018 for evaluation of chronic dyspnea on exertion. PFTs show severe obstruction with positive bronchodilator response and probably underestimated lung volumes as these were done with N2 washout. Unclear why he should have any obstruction as he is essentially a never smoker. No relevant occupational exposures. He did not improve with trial of ICS/LABA. He appeared to be in atrial fibrillation/atrial flutter with RVR based on EKG done in clinic, and was sent to the ED for evaluation.  Atrial fibrillation was confirmed, an outpatient cardioversion was attempted however unsuccessful, and the patient is now rate controlled.        He tried Symbicort before, but did not tolerate it.  That his pulmonary function tests and CT show severe COPD & emphysema is surprising in that he has never smoked, and has had no occupational exposures nor secondhand smoke exposure aside from living with his father up until the age of 18 years old. He was started on Spiriva 3/6 and states that he has not had significant dyspnea since then and is rarely needing to use his rescue albuterol inhaler.      3/15/2019 chest CT: severe emphysema, mild BLL bronchiectasis  3/15/2019 devin: severe COPD, FEV1 & FVC not significantly changed from 10/23/2018 PFTs, diffusing capacity moderately reduced  5/17/2019 STOP BANG questionnaire: Score of 3 for feeling fatigue during the daytime, age over 50 years old and male gender, indicating a high risk of MARLEN  Bronchiectasis work-up labs were unremarkable  Alpha-1 antitrypsin level 101, heterozygous for the Z allelle      On 5/17/2019 follow-up visit the patient complained of increased cough and dyspnea on exertion over the past week, concerning for acute bronchitis.  He also again reports feeling significantly fatigued during the daytime but has not yet informed his primary doctor or sought work-up for  it.  He states that he is tried the Spiriva we prescribed however it does not feel it is helping, and does not believe that his cough improved at all since starting ranitidine       Recommendations:  Z-pack x5 days for suspected acute bronchitis contributing to the increased cough and dyspnea on exertion of    Sleep test and clinic eval, discussed in length with the patient who is now agreeable    Pulmonary rehab ordered and advised, however patient states that he will think about it and is unsure whether he wants to proceed    Stop ranitidine and Spiriva    Return to clinic in 3 months       CCx: Cough and shortness of breath    HPI: Mr. Ocampo states that he is still having a cough and shortness of breath that has improved at all with ranitidine or Spiriva.  He believes that both of these have increased over the past week.  He still feels significantly tired and fatigued during the daytime, however has not sought work-up or treatment from his primary care physician.  He states that he feels very frustrated because nothing that he has tried so far have symptoms.  On the other hand however, he states that they are not bad enough for him to want to try pulmonary rehab    ROS:  A review of 12 organ systems was performed with pertinent positives and negatives noted in the HPI.      Current Meds:  Current Outpatient Medications   Medication Sig     albuterol (PROAIR HFA;PROVENTIL HFA;VENTOLIN HFA) 90 mcg/actuation inhaler Inhale 2 puffs every 6 (six) hours as needed for wheezing or shortness of breath. Use with spacer     cholecalciferol, vitamin D3, 1,000 unit tablet Take 1,000 Units by mouth daily.     levothyroxine (SYNTHROID, LEVOTHROID) 175 MCG tablet TAKE 1 TABLET BY MOUTH EVERY MORNING AT 6 AM     metFORMIN (GLUCOPHAGE) 500 MG tablet Take 1 tablet (500 mg total) by mouth 2 (two) times a day with meals.     multivitamin with minerals (THERA-M) 9 mg iron-400 mcg Tab tablet Take 1 tablet by mouth daily.     niacin  "500 MG tablet Take 500 mg by mouth.     ranitidine (ZANTAC) 150 MG tablet Take 1 tablet (150 mg total) by mouth 2 (two) times a day.     rivaroxaban 20 mg Tab Take 1 tablet (20 mg total) by mouth daily with supper.     sotalol (BETAPACE) 80 MG tablet Take 1 tablet (80 mg total) by mouth 2 (two) times a day.     tiotropium (SPIRIVA) 18 mcg inhalation capsule Place 1 capsule (2 puffs total) into inhaler and inhale daily.     azithromycin (ZITHROMAX) 250 MG tablet Take 1 tablet (250 mg total) by mouth daily for 5 days. Take 500 mg (2 x 250 mg tablets) on day 1 followed by 250 mg (1 tablet) on days 2-5.       Labs:  No results found for this or any previous visit (from the past 72 hour(s)).    I have personally reviewed all pertinent imaging studies and PFT results unless otherwise noted.    Imaging studies:  No results found.      Physical Exam:  /62   Pulse 66   Resp 12   Ht 5' 11.25\" (1.81 m)   Wt 201 lb (91.2 kg)   SpO2 94%   BMI 27.84 kg/m    General - Well nourished.  Appears fatigued and somewhat irritated  Ears/Mouth -  OP pink moist, no thrush  Neck - no cervical lymphadenopathy  Lungs - Clear to ausculation bilaterally, no crackles or wheezes appreciated  CVS - regular rhythm with no murmurs, rubs or gallups  Abdomen - soft, NT, ND, NABS  Ext - no cyanosis, clubbing or edema  Skin - no rash  Psychology - alert and oriented, answers appropriate        Electronically signed by:    Rickey Rodriguez MD  [facility]  "

## 2021-05-28 NOTE — TELEPHONE ENCOUNTER
Refill Approved    Rx renewed per Medication Renewal Policy. Medication was last renewed on 1/25/19.    Elaine Collazo, [facility] Triage/Med Refill 4/26/2019     Requested Prescriptions   Pending Prescriptions Disp Refills     levothyroxine (SYNTHROID, LEVOTHROID) 175 MCG tablet [Pharmacy Med Name: Levothyroxine Sodium Oral Tablet 175 MCG] 90 tablet 0     Sig: TAKE 1 TABLET BY MOUTH EVERY MORNING AT 6 AM       Thyroid Hormones Protocol Passed - 4/24/2019  9:10 AM        Passed - Provider visit in past 12 months or next 3 months     Last office visit with prescriber/PCP: 11/30/2018 Kimmy He MD OR same dept: 11/30/2018 Kimmy He MD OR same specialty: 11/30/2018 Kimmy He MD  Last physical: 2/7/2019 Last MTM visit: Visit date not found   Next visit within 3 mo: Visit date not found  Next physical within 3 mo: Visit date not found  Prescriber OR PCP: Kimmy He MD  Last diagnosis associated with med order: 1. Hypothyroidism  - levothyroxine (SYNTHROID, LEVOTHROID) 175 MCG tablet [Pharmacy Med Name: Levothyroxine Sodium Oral Tablet 175 MCG]; TAKE 1 TABLET BY MOUTH EVERY MORNING AT 6 AM  Dispense: 90 tablet; Refill: 0    2. Hypersomnia  - levothyroxine (SYNTHROID, LEVOTHROID) 175 MCG tablet [Pharmacy Med Name: Levothyroxine Sodium Oral Tablet 175 MCG]; TAKE 1 TABLET BY MOUTH EVERY MORNING AT 6 AM  Dispense: 90 tablet; Refill: 0    If protocol passes may refill for 12 months if within 3 months of last provider visit (or a total of 15 months).             Passed - TSH on file in past 12 months for patient age 12 & older     TSH   Date Value Ref Range Status   02/07/2019 0.75 0.30 - 5.00 uIU/mL Final

## 2021-05-28 NOTE — PROGRESS NOTES
"Pt was seen in clinic for Qtc check with new Sotalol start and rhythm check. Rhythm is now NSR. He confirmed he started on Saturday. He reports that he feels pretty good a \"9.2\" out of 10 if he could put it to a scale. He does report some fatigue but denies dizziness or lightheadedness.   EKG: NSR, low voltage QRS  Vent rate: 64  VT interval :200 ms  QRS duration: 92 ms  QT/QTc: 440/453  P-R-T axes: 80 31 62  "

## 2021-05-28 NOTE — TELEPHONE ENCOUNTER
Please give patient [facility] Pulmon[facility] clinic information so he can set up a follow up appointment with Dr Sosa. He has already seen [facility] pulmonary (last visit was 3/2019) and was told to come back in 2 months for follow up. No need for new referral.

## 2021-05-28 NOTE — TELEPHONE ENCOUNTER
Reason contacted:  Message from MD  Information relayed: Spoke to patient and he had received the pulmonary scheduling information via [employer] from [employer].  Pt to call back with any further questions or concerns.  Additional questions:  No  Further follow-up needed:  No

## 2021-05-28 NOTE — PATIENT INSTRUCTIONS - HE
Campos Ocampo,    It was a pleasure to see you today at the [facility].     My recommendations after this visit include:    Continue Xarelto 20 mg daily    Today stop taking flecainide.    On Saturday, stop taking diltiazem and losartan.  Start sotalol 80 mg twice daily.    EKG on Monday.  If you are still in A fib/flutter, plan for cardioversion to get you back in normal rhythm.    Call if you get more short of breath on sotalol, stop taking sotalol and restart diltiazem and losartan.    One of the EP nurses will call you next week regarding ablation (pulmonary vein isolation ablation)    Follow up in 1 month    Ashley Hutchinson, [facility], Electrophysiology  [phone]  Svitlana (nurse) [phone]  EP nurses [phone]

## 2021-05-28 NOTE — TELEPHONE ENCOUNTER
----- Message from Ashley Hutchinson CNP sent at 4/30/2019  8:06 AM CDT -----  Can you please set him up for an echo per Dr. Gutierrez's recommendation?  ThanksAshley  ----- Message -----  From: Eitan Gutierrez MD  Sent: 4/30/2019   7:37 AM  To: STACI Markham,  That was a stress echo in August 2018.  I would prefer that he have a standard echo to assess his valves and LV function.  Thanks  Eitan      ----- Message -----  From: Ashley Hutchinson CNP  Sent: 4/26/2019   8:07 AM  To: Eitan Gutierrez MD, [facility] Ep Rn Support Pool    He had an echo 8/16/18 at [facility].  Do you want another one done here?    ----- Message -----  From: Eitan Gutierrez MD  Sent: 4/25/2019   7:06 PM  To: Ashley Hutchinson CNP, [facility] Ep Rn Support Pool        ----- Message -----  From: Ashley Hutchinson CNP  Sent: 4/25/2019  12:03 PM  To: Eitan Gutierrez MD    Pt interested in PVI.  Failed flecainide, starting sotalol.    EP RNs: please call Campos next week to see if he would like to proceed with PVI and if he would like to meet Dr. Gutierrez in clinic prior.  ThanksAshley

## 2021-05-29 NOTE — PATIENT INSTRUCTIONS - HE
Campos Ocampo,    It was a pleasure to see you today at the [facility].     My recommendations after this visit include:    Continue Xarelto 20 mg daily with your evening meal  Continue sotalol 80 mg twice daily    Take furosemide (Lasix) 40 mg for 2 days (start tomorrow), then decrease to 20 mg daily.  Call if leg swelling worsens or does not improve.    Increase daily water/fluid intake (drink in the morning)    Follow up in 3 months, or sooner if needed.    Ashley Hutchinson, [facility], Electrophysiology  [phone]  Svitlana (nurse) [phone]   nurses [phone]

## 2021-05-29 NOTE — PROGRESS NOTES
Sleep Direct Referral Source: Pulmonary  Order for sleep study received, reviewed, and approved.   History (per ordering provider's note and chart review):   86 yo male with history of HTN, DM, never smoker, presented to pulmonary clinic 12/21/2018 for evaluation of chronic dyspnea on exertion  PFTs show severe obstruction with positive bronchodilator response and probably underestimated lung volumes as these were done with N2 washout  5/17/2019 STOP BANG questionnaire: Score of 3 for feeling fatigue during the daytime, age over 50 years old and male gender, indicating a high risk of MARLEN  Comments for study:   TCM Study (Ofelia 3).  High risk for hypoventilation based on obstructive PFT findings.    González Perez MD  8:34 AM, 6/6/2019

## 2021-05-30 NOTE — PROGRESS NOTES
Chief Complaint   Patient presents with     Cough     ongoing x 1 week, worsening, the more active the patient is it worsens          HPI      Patient is here for a week of a worsening cough, mostly dry, but the cough is deep per patient. No fever, shortness of breath, chest pain. He has COPD. Similar symptoms in the past treated well with Z-FELIX.    ROS: Pertinent ROS noted in HPI.     Allergies   Allergen Reactions     Penicillins Unknown       Patient Active Problem List   Diagnosis     Erectile dysfunction, unspecified erectile dysfunction type     Essential hypertension     Hyperlipidemia LDL goal <100     Hypothyroidism     Type 2 diabetes mellitus without complication (H)     Environmental allergies     BPH (benign prostatic hyperplasia)     History of kidney stones - s/p right lithotripsy and cystoscopy with urology 8/10/2017     Low hemoglobin     Hypersomnia     Severe chronic obstructive pulmonary disease (H)     Thrombocytopenia (H)     Persistent atrial fibrillation (H)     VILLEGAS (dyspnea on exertion)     Bilateral lower extremity edema       Family History   Problem Relation Age of Onset     Stroke Mother         cerebral hemorrage     Emphysema Father        Social History     Socioeconomic History     Marital status:      Spouse name: Not on file     Number of children: 4     Years of education: Not on file     Highest education level: Not on file   Occupational History     Not on file   Social Needs     Financial resource strain: Not on file     Food insecurity:     Worry: Not on file     Inability: Not on file     Transportation needs:     Medical: Not on file     Non-medical: Not on file   Tobacco Use     Smoking status: Never Smoker     Smokeless tobacco: Never Used   Substance and Sexual Activity     Alcohol use: Yes     Alcohol/week: 0.6 oz     Types: 1 Glasses of wine per week     Drug use: No     Sexual activity: Never   Lifestyle     Physical activity:     Days per week: Not on file      Minutes per session: Not on file     Stress: Not on file   Relationships     Social connections:     Talks on phone: Not on file     Gets together: Not on file     Attends Jain service: Not on file     Active member of club or organization: Not on file     Attends meetings of clubs or organizations: Not on file     Relationship status: Not on file     Intimate partner violence:     Fear of current or ex partner: Not on file     Emotionally abused: Not on file     Physically abused: Not on file     Forced sexual activity: Not on file   Other Topics Concern     Not on file   Social History Narrative    Has 4 sons. Has 7 grandchildren. Has 1 great grandchild.     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     Has been  63 years in 2018.     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          Objective:    Vitals:    07/27/19 0959   BP: 123/75   Pulse: 62   Resp: 10   Temp: 97.8  F (36.6  C)   SpO2: 93%       Gen: NAD  Throat: oropharynx clear, tonsils normal  Nose: no discharge  Neck: No adenopathy  CV: RRR, normal S1S2, no M, R, G  Pulm: CTAB, normal effort      Acute bronchitis, unspecified organism  -     azithromycin (ZITHROMAX Z-FELIX) 250 MG tablet; Take 2 tablets (500 mg) on  Day 1,  followed by 1 tablet (250 mg) once daily on Days 2 through 5.

## 2021-05-30 NOTE — PATIENT INSTRUCTIONS - HE
I have referred you for Neuro psych assessment and Psychiatry management.    It appears you have fallen prone to many scams and unable to change your behavior.    I am concerned with Obsessive Compulsive behavior versus Confabulation disorder.    At this time refrain from using online resources     Stay with a family member most of the times if possible.

## 2021-05-30 NOTE — PROGRESS NOTES
Assessment:     1. Behavior concern in adult  Ambulatory referral to Psychology    Ambulatory referral to Psychiatry   2. At risk for abuse       Medical decision making: This is an 85-year-old gentleman with a past medical history of atrial fibrillation, diabetes mellitus, dyspnea on exertion, hypertension and newly diagnosed COPD disease who is well managed medically presents with a unique mental health situation.    He has been lying about making financial transection where he has lost $1 million and keeps falling for the scans.  He has been involved multiple times by his family and friends but it appears that he is finding unique ways of lying and continuing with such activities.    This is in contradiction to his being learned man and as of 2017 had a neuropsych evaluation that did not indicate any dementia.    My concern is that due to his education and having knowledge of psychology, he was able to by- pass the assessments.    I did question about alcohol use and possible Korsakoff psychosis/confabulation disorder.  There is no concerning history.    At this time he will be best served by a detailed psychology and psychiatry i evaluation including a repeat neuropsych evaluation.  Further referred to neurologist can also be done.    Greater than 40 minutes was spent today in interview and examination  with more than 50% of that time in counseling and coordination of care and again talking to wife and him about refraining and being monitored until such evaluations are being done.  Discussed common senior scams.  I am sure they have had adequate briefings on this in the past as they have already even gone to the extent of her neuropsych testing back in 2017.       Plan:      The diagnosis was discussed with the patient and evaluation and treatment plans outlined.   Patient Instructions   I have referred you for Neuro psych assessment and Psychiatry management.    It appears you have fallen prone to many scams  and unable to change your behavior.    I am concerned with Obsessive Compulsive behavior versus Confabulation disorder.    At this time refrain from using online resources     Stay with a family member most of the times if possible.             Subjective:      Campos Ocampo is a  male who presents for evaluation of   Chief Complaint   Patient presents with     Memory Loss     Pt here today to discuss occasional memory loss, destructive behavior, states is on so much medication and isn't sure if its from that or stress related. Pt states sometimes he is alert and sometimes he is confused         Campos Ocampo  has a past medical history  has a past medical history of Atrial fibrillation (H), Diabetes mellitus (H), VILLEGAS (dyspnea on exertion) (12/21/2018), Hyperlipidemia LDL goal <100 (9/6/2018), Hypertension, Ptosis, left eyelid, Severe chronic obstructive pulmonary disease (H) (11/1/2018), and Shingles.      Patient presents today with a unique mental health situation.  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  He has a PhD in communication.    Most of the history is provided by accompanying wife of 65 years and printed letter from his sons.  He is agreeable to the content of the letter and the history presented by the wife.    The just of his mental situation is that he has been succumbing to multiple scammers with fraudulent financial transactions and has depleted his financial assets.    He has sent over S1.1 million 2 dozens of different scammers through 100s of transections including wire transfer and gift cards.  He has continued this activity over the last 5 years despite being shown evidence that scammers are fraudulent with no purpose other than to take his money and yet he continuously reaches back to them providing his phone number and you email addresses.  He is can good additional debt to creation of lows and credit  accounts.  He is repeatedly lied to his family, friends and has again borrowed money.    This has been ongoing since .    In 2017 the family, got a neurocognitive screening done at [facility].  There is a copy of the report provided by the family.  The neurocognitive screening was completed to provide a baseline of Mr. Ocampo's cognitive ability, learning and memory and executive functioning skills and to determine if Mr. Ocampo should be referred for a full neuropsychological assessment.    The test results of D-K EFS, verbal fluency of neuropsychological assessment showed that Mr. Ocampo's memory, his ability to nonverbal information over 5 trials was in average range in fact the task that required him to generate word based on a particular letter and category he was at a average to high average ability.    Based on the results, there did not appear to be any cognitive impairment that would lead to Mr. Ocampo being scammed.  Rather it was thought that he is a trusting gentleman who fell for the scan.  This was based on his executive functioning task.      It is of interest that since then he has continued with his activity as detailed in a timewise chart by his son with very recent 2019 of lying and involving convincing another resident at [facility] to send wire transfers to pay for friends medical and  expenses for a total of a$13,350    None of this has been disclosed to his PCP or the previous medical providers in the past      The following portions of the patient's history were reviewed and updated as appropriate: allergies, current medications, past family history, past medical history, past social history, past surgical history and problem list.  Allergies   Allergen Reactions     Penicillins Unknown       Current Outpatient Medications on File Prior to Visit   Medication Sig Dispense Refill     cholecalciferol, vitamin D3, 1,000 unit tablet Take  1,000 Units by mouth daily.       furosemide (LASIX) 20 MG tablet Take 1 tablet (20 mg total) by mouth daily. 90 tablet 3     levothyroxine (SYNTHROID, LEVOTHROID) 175 MCG tablet TAKE 1 TABLET BY MOUTH EVERY MORNING AT 6 AM 90 tablet 0     metFORMIN (GLUCOPHAGE) 500 MG tablet Take 1 tablet (500 mg total) by mouth 2 (two) times a day with meals. 90 tablet 3     multivitamin with minerals (THERA-M) 9 mg iron-400 mcg Tab tablet Take 1 tablet by mouth daily.       niacin 500 MG tablet Take 500 mg by mouth.       rivaroxaban 20 mg Tab Take 1 tablet (20 mg total) by mouth daily with supper. 90 tablet 3     sotalol (BETAPACE) 80 MG tablet Take 1 tablet (80 mg total) by mouth 2 (two) times a day. 180 tablet 3     tiotropium bromide (SPIRIVA WITH HANDIHALER INHL) Inhale 2 Inhalation daily.       albuterol (PROAIR HFA;PROVENTIL HFA;VENTOLIN HFA) 90 mcg/actuation inhaler Inhale 2 puffs every 6 (six) hours as needed for wheezing or shortness of breath. Use with spacer 11 Inhaler 11     No current facility-administered medications on file prior to visit.        Patient Active Problem List   Diagnosis     Erectile dysfunction, unspecified erectile dysfunction type     Essential hypertension     Hyperlipidemia LDL goal <100     Hypothyroidism     Type 2 diabetes mellitus without complication (H)     Environmental allergies     BPH (benign prostatic hyperplasia)     History of kidney stones - s/p right lithotripsy and cystoscopy with urology 8/10/2017     Low hemoglobin     Hypersomnia     Severe chronic obstructive pulmonary disease (H)     Thrombocytopenia (H)     Persistent atrial fibrillation (H)     VILLEGAS (dyspnea on exertion)     Bilateral lower extremity edema       Past Medical History:   Diagnosis Date     Atrial fibrillation (H)      Diabetes mellitus (H)      VILLEGAS (dyspnea on exertion) 12/21/2018     Hyperlipidemia LDL goal <100 9/6/2018     Hypertension      Ptosis, left eyelid      Severe chronic obstructive pulmonary  disease (H) 11/1/2018     Shingles        Past Surgical History:   Procedure Laterality Date     CARDIOVERSION  02/11/2019    nsr x 1 min, then afib     CHOLECYSTECTOMY       eyelid surgery Left 04/2018     KIDNEY STONE SURGERY  2018       Family History   Problem Relation Age of Onset     Stroke Mother         cerebral hemorrage     Emphysema Father        Social History     Socioeconomic History     Marital status:      Spouse name: None     Number of children: 4     Years of education: None     Highest education level: None   Occupational History     None   Social Needs     Financial resource strain: None     Food insecurity:     Worry: None     Inability: None     Transportation needs:     Medical: None     Non-medical: None   Tobacco Use     Smoking status: Never Smoker     Smokeless tobacco: Never Used   Substance and Sexual Activity     Alcohol use: Yes     Alcohol/week: 0.6 oz     Types: 1 Glasses of wine per week     Drug use: No     Sexual activity: Never   Lifestyle     Physical activity:     Days per week: None     Minutes per session: None     Stress: None   Relationships     Social connections:     Talks on phone: None     Gets together: None     Attends Protestant service: None     Active member of club or organization: None     Attends meetings of clubs or organizations: None     Relationship status: None     Intimate partner violence:     Fear of current or ex partner: None     Emotionally abused: None     Physically abused: None     Forced sexual activity: None   Other Topics Concern     None   Social History Narrative    Has 4 sons. Has 7 grandchildren. Has 1 great grandchild.     Used to work as a medical school faculty PhD in communication at [employer] then also at [employer] coaching physicians (physician [employer]).     Has been  63 years in 2018.     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        Review of Systems  A 12 point comprehensive review  of systems was negative except as noted.      I have reviewed his report from cardiologist and pulmonology and he appears to be stable regarding his atrial fibrillation and COPD.    He also remains stable regarding his diabetes with hemoglobin A1c at 6.4 in February.     Objective:     /55 (Patient Site: Left Arm, Patient Position: Sitting, Cuff Size: Adult Large)   Pulse 63   Resp 16   Wt 187 lb (84.8 kg)   SpO2 99%   BMI 25.90 kg/m      General Appearance: healthy, alert, oriented and no distress  HEENT Exam: Oropharynx clear without lesion or exudate  Neck:  supple, no adenopathy, thyroid normal  Heart: Normal heart sounds,   Lungs: Normal breath sounds, Clear to auscultation bilateral  Skin exam: No visible or palpable abnormalities  Neurological exam: gait normal, alert and oriented X 3  Hem/Lymph/Immuno exam: negative findings:  no cervical nodes, no supraclavicular nodes,  Psych: Good eye contact, normal speech and content, no flight of ideas. Animated during conversation.

## 2021-05-30 NOTE — TELEPHONE ENCOUNTER
Noted.    Phone call to patient and reviewed the below information.  He states that he does weigh himself daily and that he would appreciate taking as needed.  Reviewed instructions for taking and explained that if he notes an increase in edema, increase in shortness of breath or continues to need to take Furosemide then he should call back for further instructions.  He states understanding and reviewed contact information.

## 2021-05-30 NOTE — TELEPHONE ENCOUNTER
Ok to use Lasix PRN: daily weights, Take lasix 20 mg if weight increases 2-3 pounds in 2 days or 5 pounds in 1 week.  Call if needing to take frequently.  Thanks,  Ashley

## 2021-05-30 NOTE — TELEPHONE ENCOUNTER
Phone call to patient after reviewing his email to Ashley Hutchinson NP regarding questions about Lasix.  Pt was seen in clinic with Ashley Hutchinson on 5-30-19 for persistent afib, spontaneous conversion to SR with initiation of Sotalol.  Pt had shortness of breath with lower extremity edema at that time and Lasix was ordered, 40 mg daily for 2 days and then 20 mg daily.  Pt does have a significant hx of COPD.  Pt reports that he no longer has edema and feels that his shortness of breath is better, he is wondering if he needs to take Lasix any more.  Explained the rationale behind Lasix and that if it is working he should continue to take.  He states he is frustrated with having to go to the bathroom all the time.    Stress test on 10-16-18 showed an EF 76%, ECHO was ordered at his last visit, though patient is still unsure if he would like to proceed with PVI and has not completed the exam.  Will review with Ashley Hutchinson and call patient with further instructions.

## 2021-05-31 NOTE — TELEPHONE ENCOUNTER
Medication Question or Clarification  Who is calling: Patients wife  What medication are you calling about? (include dose and sig)   furosemide (LASIX) 20 MG tablet 60 tablet 0 8/9/2019     Sig - Route: Take 2 tablets (40 mg total) by mouth as needed (take one tabet for weight gain of 2-3 lb in 2 days or 5 lb in one week). - Oral    Class: Fill Later        Who prescribed the medication?: Dr Parker  What is your question/concern?: Patients wife calling about this medication. States it has not been sent to the pharmacy yet.  Please address.  Pharmacy: [facility]  Okay to leave a detailed message?: Yes  Site CMT - Please call the pharmacy to obtain any additional needed information.

## 2021-05-31 NOTE — TELEPHONE ENCOUNTER
Spoke with provider. Provider changed fill type of medication and resent med to pharmacy. Patient was notified.

## 2021-05-31 NOTE — TELEPHONE ENCOUNTER
Patient's wife called to say that the medication has not been received at the pharmacy.  Called clinic site.Provider still there. CMA will have PCP fix RX and resend.  Informed wife that it is going to be fixed soon.  Ella Pennington RN  [facility] Triage Nurse  5:13 PM  8/9/2019

## 2021-05-31 NOTE — PROGRESS NOTES
ASSESSMENT/PLAN  1. Shortness of breath  Suspect components of congestive heart failure leading to shortness of breath and cough  X-ray showing no focal infiltrate  Hemogram showing no leukocytosis  Lower extremity edema with shortness of breath and a nonproductive cough discussed with patient my concern for fluid overload  He is felt a bloating in his abdomen as well  We will increase furosemide to 40 mg in the morning over the next few days and follow-up based on response to increase diuretics and lab work  - Comprehensive Metabolic Panel  - HM2(CBC w/o Differential)  - BNP(B-type Natriuretic Peptide)  - XR Chest 2 Views; Future  - Thyroid [place]    2. Cough  Suspect some fluid overload  Patient has a history of atrial fibrillation and COPD  He feels he is more short of breath with ambulation and has been experiencing increasing lower extremity edema  Will double his Lasix to 40 mg and check lab work today  COPD could also be contributing with his severe COPD noted on x-ray  Continue with inhalers this time and follow-up with the patient next week  - Comprehensive Metabolic Panel  - HM2(CBC w/o Differential)  - BNP(B-type Natriuretic Peptide)  - XR Chest 2 Views; Future  - Thyroid [place]    3. Hypothyroidism, unspecified type  With patient's fatigue and history of hypothyroidism we will check thyroid levels today  Suspect a component of fluid overload and combination of COPD that is leading to his fatigue  - Thyroid [place]    4. VILLEGAS (dyspnea on exertion)  Please see #1 2 and 3 above  Double patient's Lasix  Consider echo to assess ejection fraction if symptoms not improving with increased diuretics  - furosemide (LASIX) 20 MG tablet; Take 2 tablets (40 mg total) by mouth as needed (take one tabet for weight gain of 2-3 lb in 2 days or 5 lb in one week).  Dispense: 60 tablet; Refill: 0        SUBJECTIVE:   Chief Complaint   Patient presents with     Bronchitis     Pt was Dx with Bronchitis 3 wks ago at [facility], was  given ABX which worked temporarily and now has persistant dry cough, SOB(Pt feels taking Spiriva is questionable with helping Sx)      Fatigue     Pt is constantly fatigued x 1 mo     Bloated     Pt states abdomen is enlarged     Campos Ocampo 85 y.o. male    Current Outpatient Medications   Medication Sig Dispense Refill     cholecalciferol, vitamin D3, 1,000 unit tablet Take 1,000 Units by mouth daily.       furosemide (LASIX) 20 MG tablet Take 2 tablets (40 mg total) by mouth as needed (take one tabet for weight gain of 2-3 lb in 2 days or 5 lb in one week). 60 tablet 0     levothyroxine (SYNTHROID, LEVOTHROID) 175 MCG tablet TAKE 1 TABLET BY MOUTH EVERY MORNING AT 6 AM 90 tablet 0     memantine (NAMENDA) 5 MG tablet 5 mg daily.  1     metFORMIN (GLUCOPHAGE) 500 MG tablet Take 1 tablet (500 mg total) by mouth 2 (two) times a day with meals. 90 tablet 3     multivitamin with minerals (THERA-M) 9 mg iron-400 mcg Tab tablet Take 1 tablet by mouth daily.       niacin 500 MG tablet Take 500 mg by mouth.       rivaroxaban 20 mg Tab Take 1 tablet (20 mg total) by mouth daily with supper. 90 tablet 3     sotalol (BETAPACE) 80 MG tablet Take 1 tablet (80 mg total) by mouth 2 (two) times a day. 180 tablet 3     tiotropium bromide (SPIRIVA WITH HANDIHALER INHL) Inhale 2 Inhalation daily.       albuterol (PROAIR HFA;PROVENTIL HFA;VENTOLIN HFA) 90 mcg/actuation inhaler Inhale 2 puffs every 6 (six) hours as needed for wheezing or shortness of breath. Use with spacer 11 Inhaler 11     No current facility-administered medications for this visit.      Allergies: Penicillins   No LMP for male patient.    HPI:   Patient presented with a combination of symptoms including fatigue abdominal bloating shortness of breath and a dry nonproductive cough over the last few weeks.  Patient has a complex past medical history with advanced COPD, atrial fibrillation, status post cardioversion failure x2.  Patient expresses no orthopnea but is  short of breath with ambulation.  There is been his change in the last few weeks-when questioned about edema patient denies but he has 1+ edema in his bilateral lower extremities to the knees.  Patient is on frusemide 20 mg daily.  Currently the patient's heart rhythm is irregular but rate controlled.  He has rhonchi in his bilateral lung fields.  We obtained chest x-ray imaging showing no acute infiltrate and lab work showing no leukocytosis.  We discussed with patient increasing his diuretics and assessing with further blood work for causes of the cough and fatigue.  It could very well be that his cough and fatigue is worsening COPD.  We will further follow-up based on laboratory work.  Patient is agreement to increase Lasix over the weekend and will follow-up based on labs.    ROS: negative except as per HPI    OBJECTIVE:   The patient appears well, alert, oriented x 3, in no distress.  /63 (Patient Site: Left Arm, Patient Position: Sitting, Cuff Size: Adult Large)   Pulse 66   Resp 16   Wt 193 lb 9.6 oz (87.8 kg)   SpO2 95%   BMI 26.81 kg/m      Lungs: clear, good air entry, no wheezes, rhonchi noted in LL fields  Cardiac: S1 and S2 normal, no murmurs, regular rate and rhythm.   Abdomen: normal bowel sounds, soft   Extremities: show no edema, normal peripheral pulses.   Neurological: normal, no focal findings.  Skin: clear, dry, no rashes/lesions  Psych- normal mood and affect      Pt states an understanding and agrees to the above plan.  Greater than 25 minutes was spent today in interview and examination with Campos Ocampo with more than 50% of that time in counseling and coordination of care.

## 2021-05-31 NOTE — PROGRESS NOTES
ASSESSMENT/PLAN  1. Chronic obstructive pulmonary disease, unspecified COPD type (H)  Patient continues to have a cough that is nonproductive  Suspect COPD leading to his cough and shortness of breath with ambulation  Discussed increasing dosing on his Advair  Patient has follow-up with his pulmonary doctor in roughly 1 month  - fluticasone propion-salmeterol (ADVAIR DISKUS) 250-50 mcg/dose DISKUS; Inhale 1 puff 2 (two) times a day.  Dispense: 1 each; Refill: 0    2. Type 2 diabetes mellitus without complication, without long-term current use of insulin (H)  A1c at goal range  Continue with metformin at this time  Follow-up at six-month intervals for diabetes  Continue work on diet and exercise  - Glycosylated Hemoglobin A1c    3. Congestive heart failure, unspecified HF chronicity, unspecified heart failure type (H)  Patient has lost 9 pounds in the last few weeks  Lower extremities are less swollen  Continue with 40 mg of furosemide  Check kidney function and potassium levels today  - Basic Metabolic Panel        SUBJECTIVE:   Chief Complaint   Patient presents with     COPD     Follow up      Cough     Non-productive cough, worse with exertion and talking     Campos Ocampo 86 y.o. male    Current Outpatient Medications   Medication Sig Dispense Refill     albuterol (PROAIR HFA;PROVENTIL HFA;VENTOLIN HFA) 90 mcg/actuation inhaler Inhale 2 puffs every 4 (four) hours as needed for wheezing or shortness of breath. Use with spacer 1 Inhaler 11     cholecalciferol, vitamin D3, 1,000 unit tablet Take 1,000 Units by mouth daily.       fluticasone propion-salmeterol (ADVAIR DISKUS) 100-50 mcg/dose DISKUS Inhale 1 puff 2 (two) times a day. 60 each 2     fluticasone propionate (FLONASE) 50 mcg/actuation nasal spray Apply 1 spray into each nostril daily.       furosemide (LASIX) 20 MG tablet Take 2 tablets (40 mg total) by mouth daily. 60 tablet 0     levothyroxine (SYNTHROID, LEVOTHROID) 175 MCG tablet TAKE 1 TABLET BY  MOUTH EVERY MORNING AT 6 AM 90 tablet 0     memantine (NAMENDA) 5 MG tablet 5 mg daily.  1     metFORMIN (GLUCOPHAGE) 500 MG tablet Take 1 tablet (500 mg total) by mouth 2 (two) times a day with meals. 90 tablet 3     multivitamin with minerals (THERA-M) 9 mg iron-400 mcg Tab tablet Take 1 tablet by mouth daily.       rivaroxaban 20 mg Tab Take 1 tablet (20 mg total) by mouth daily with supper. 90 tablet 3     sotalol (BETAPACE) 80 MG tablet Take 1 tablet (80 mg total) by mouth 2 (two) times a day. 180 tablet 3     tiotropium bromide (SPIRIVA WITH HANDIHALER INHL) Inhale 2 Inhalation daily.       fluticasone propion-salmeterol (ADVAIR DISKUS) 250-50 mcg/dose DISKUS Inhale 1 puff 2 (two) times a day. 1 each 0     No current facility-administered medications for this visit.      Allergies: Penicillins   No LMP for male patient.    HPI:   Patient is here for follow-up  Was seen 3 weeks ago for suspected fluid overload and a dry nonproductive cough  Diuretics were increased and he is down 9 pounds in the last few weeks  His lower extremity swelling has improved but his cough still remains nonproductive  He does feel it is improved somewhat  Suspect there is a combination of his COPD and fluid overload  We will increase Advair at this time to see if this further and improves his cough  He is follow-up with his pulmonary doctor in about a month  Continue with current diuretics we will check kidney function and potassium levels today    Patient is a diabetic and is on metformin for diabetes-A1c returns at goal range today we will continue with current dosing  Continue to work on healthy eating and getting regular exercise  Follow-up for diabetes at 6-month intervals    ROS: negative except as per HPI    OBJECTIVE:   The patient appears well, alert, oriented x 3, in no distress.  /57 (Patient Site: Left Arm, Patient Position: Sitting, Cuff Size: Adult Regular)   Pulse 66   Temp (!) 96.5  F (35.8  C) (Oral)   Resp  18   Wt 184 lb 9.6 oz (83.7 kg)   BMI 25.57 kg/m      Lungs: clear, good air entry, no wheezes, rhonchi or rales.   Cardiac: S1 and S2 normal, no murmurs, regular rate and rhythm.   Abdomen: normal bowel sounds, soft   Extremities: show no edema, normal peripheral pulses.   Neurological: normal, no focal findings.  Skin: clear, dry, no rashes/lesions  Psych- normal mood and affect      Pt states an understanding and agrees to the above plan.  Greater than 25 minutes was spent today in interview and examination with Campos Ocampo with more than 50% of that time in counseling and coordination of care.

## 2021-05-31 NOTE — PROGRESS NOTES
Patient instructed in use of spriva handihaler, adviar diskus, and albuterol HFA with a spacer device.    Patient states good understanding of how to use all inhalers. Printed instructions as well as phone numbers to call with any questions given to patient as well.

## 2021-05-31 NOTE — PATIENT INSTRUCTIONS - HE
Today you were seen for follow up of your cough & breathing.     Plan:    Start Advair inhaler twice a day, every day, roughly 12 hours apart    Continue using your Spiriva daily    I want you to try using albuterol when you are particularly short of breath to see if it works, & use your albuterol when you have a lot of cough -- keep track of the effect    Go back to using your Flonase (over the counter fluticasone) nasal spray, let's see if it helps your cough    Please, remember that appropriate use of inhalers is essential to their efficacy. If you cannot remember the instructions on how to properly use the prescribed inhalers, you can visit your pharmacist to request a demonstration, or you can review a video online. There are many helpful videos on YouTube that you can access to review instructions and demonstrations on the use of different inhalers. You should exercise common sense when looking for videos -- best and most informative inhaler videos come from health care organizations.    You should follow up in 2 months.     If you have any questions or concerns, please, call our clinic at [phone].

## 2021-05-31 NOTE — PROGRESS NOTES
Pulmonary Clinic Outpatient Follow-Up  8/14/2019     Assessment and Plan:   85 year old never smoker with the most pertinent history of COPD and emphysema with severe airflow obstruction and significant bronchodilator response, atrial fibrillation, clinically evident diastolic heart failure, and what appears to be recent diagnosis of dementia, presenting for follow up of chronic cough and exertional dyspnea.    #.  COPD with severe airflow obstruction and clinically significant bronchodilator response.  Reportedly intolerant of Symbicort.  Does not feel that either his cough or exertional dyspnea have improved since using Spiriva.  His CAT score is 2+ 0+ 1+ 4+ 3+1+2+3=16.  He does not use his rescue inhaler for either his cough or his dyspnea, so it is hard to assess whether the symptoms are due to his underlying COPD or other etiologies (acid reflux versus postnasal drip versus atrial fibrillation versus heart failure).  Considering his clinically significant bronchodilator response in the past, he would likely benefit from inhaled corticosteroid as a component of his inhaler regimen.  It is difficult to gauge whether patient may ultimately benefit from using nebulized maintenance medications since the severity of his underlying lung disease may impair his ability to use inhaler medications effectively; would have to explore this in the future.  I did discuss the importance of proper form when using inhalers, and patient is agreeable to having hands-on teaching today.  I explained the importance of albuterol in relieving his acute symptoms, and helping us keep track of how well his COPD is being controlled.  I do not know how much effect his presumed underlying dementia may have on his ability to use his inhalers correctly or to remember/interpret his respiratory symptoms.  #.  Chronic cough, variable symptoms, nonproductive.  Based on patient's reported symptoms it does not seem to be related to bronchiectasis.   "It may be secondary to chronic bronchitis due to underlying COPD, postnasal drip, silent aspiration events, environmental allergies, and acid reflux.  There also may be habitual component to the cough.  Patient is not interested in an empiric trial of PPI therapy, namely because he does not like the concept of \"trial\" since it does not represent a definitive treatment.  He did use Flonase in the past, but stopped using it when he was put on Spiriva.  He is willing to try using it again.      Continue using daily Spiriva    Start Advair twice daily    Continue using PRN albuterol, namely with shortness of breath and cough to determine whether or not the medication has any effect    Restart using Flonase to see if it helps with any postnasal drip component    Patient should return to clinic in 2 months for a follow up.    I appreciate the opportunity to participate in the care of Campos Ocamop.  Please, feel free to contact me at any time.    Cortney Salas MD  Pulmonary and Critical Care   ______________________________________________________________________________    CC: Follow-up for cough    HPI:   Campos Ocampo is a 85 y.o. never smoker with history of COPD with severe airflow obstruction and atrial fibrillation, presenting today for follow up of chronic cough and exertional dyspnea.    Briefly, patient was diagnosed with COPD with severe airflow obstruction and clinically significant bronchodilator response, emphysema, and bronchiectasis.  It seems that he has trialed Symbicort in the past but did not feel that it was particularly helpful.  In March 2019 he was switched to Spiriva.  On follow-up in May 2019 patient reported that he did not feel that Spiriva has been particularly helpful, and this medication was stopped.  In May he was treated with a course of azithromycin for exacerbation of his COPD; he was referred to the sleep clinic and was advised to attend pulmonary rehab.    He still feels that he has " "a debilitating shortness of breath and is not able to walk more than 25 yards and is unable to climb a flight of stairs due to his breathing.  He also reports chronic cough for years that has been worse over the last 2 months.  The cough seems to be triggered by walking, talking, and going up inclines.  States that some days the cough is terrible but some days the cough is fairly slight.  He reports that he does not use his albuterol for the cough and he has not thought about using his albuterol to help with his exertional dyspnea.  His cough is nonproductive.  On a couple of occasions it was felt to represent acute bronchitis and he was prescribed a 5-day course of azithromycin.  States that on one occasion this helped, and on another occasion it did not.    Patient is very frustrated because of perceived inconsistencies in his diagnoses; states that at one point he was diagnosed with bronchitis and then another physician told him that he did not have bronchitis.  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  He is also frustrated when I suggested the potential for multiple diagnoses for chronic cough and suggested trials of different therapies.  States that he does not like the word \"try\" and that he would like to have a definitive diagnosis and a definitive treatment that works without fail.  He is also frustrated with the amount of medications that he takes and wants to avoid having to take additional medications.    I conceded that it would be very frustrating to feel that you cannot get a consistent diagnosis and to have a chronic issue that does not seem to be resolving. I did caution him that chronic cough can be notoriously difficult to treat and to eradicate completely.  I explained that with the severity of the obstruction we see on his pulmonary function testing it is unlikely that I would be able to " completely resolve his exertional dyspnea.  I also explained that treatment of COPD would involve inhaler medications, which means that we would have to add more medications to what he already takes if he would like to proceed with treating his underlying COPD.    Looking further through patient's chart, I see an appointment with Dr. Rivas on 7/5/2019 for evaluation of concerning behavior, namely succumbing to multiple online/telephone scams that led to depletion of financial assets and accumulation of significant debt.  Seems that patient has lied to his family regarding his activities, which seems to go back to 2015.  He had neurocognitive assessment in 2017 that failed to show any cognitive impairment --the final impression was that patient is a very trusting gentleman leading him to continue falling victim t scams.  He has continued with his activity (falling for various scams and even convincing another resident at his senior living facility to wire transfers totaling over $13,000).  There is a concern that patient's baseline high functional status and high educational level has enabled him to bypass appropriate neurocognitive assessments.  I see that patient was referred for repeat neuropsychiatric assessment and psychiatry management.  Since that time patient was started on memantine, however, I do not see the appointment that led to this prescription.     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     ROS:  Review of Systems - 10-point ROS reviewed and found to be negative w/ exception as detailed in the HPI.    PMH:  Patient Active Problem List    Diagnosis Date Noted     Bilateral lower extremity edema  05/30/2019     VILLEGAS (dyspnea on exertion) 12/21/2018     Persistent atrial fibrillation (H)      Thrombocytopenia (H) 11/05/2018     Low hemoglobin 11/01/2018     Hypersomnia 11/01/2018     Severe chronic obstructive pulmonary disease (H) 11/01/2018     History of kidney stones - s/p right lithotripsy and cystoscopy with urology 8/10/2017 09/11/2018     Hyperlipidemia LDL goal <100 09/06/2018     BPH (benign prostatic hyperplasia) 09/06/2018     Essential hypertension 11/05/2015     Type 2 diabetes mellitus without complication (H) 11/05/2015     Erectile dysfunction, unspecified erectile dysfunction type 06/11/2015     Environmental allergies 12/10/2014     Hypothyroidism 02/11/2011       PSH:  Past Surgical History:   Procedure Laterality Date     CARDIOVERSION  02/11/2019    nsr x 1 min, then afib     CHOLECYSTECTOMY       eyelid surgery Left 04/2018     KIDNEY STONE SURGERY  2018       Allergies:  Allergies   Allergen Reactions     Penicillins Unknown       Family HX:  Family History   Problem Relation Age of Onset     Stroke Mother         cerebral hemorrage     Emphysema Father        Social Hx:  Social History     Socioeconomic History     Marital status:      Spouse name: Not on file     Number of children: 4     Years of education: Not on file     Highest education level: Not on file   Occupational History     Not on file   Social Needs     Financial resource strain: Not on file     Food insecurity:     Worry: Not on file     Inability: Not on file     Transportation needs:     Medical: Not on file     Non-medical: Not on file   Tobacco Use     Smoking status: Never Smoker     Smokeless tobacco: Never Used   Substance and Sexual Activity     Alcohol use: Yes     Alcohol/week: 0.6 oz     Types: 1 Glasses of wine per week     Drug use: No     Sexual activity: Never   Lifestyle     Physical activity:     Days per week: Not on file     Minutes per session: Not on file     Stress: Not on file    Relationships     Social connections:     Talks on phone: Not on file     Gets together: Not on file     Attends Qu[employer] service: Not on file     Active member of club or organization: Not on file     Attends meetings of clubs or organizations: Not on file     Relationship status: Not on file     Intimate partner violence:     Fear of current or ex partner: Not on file     Emotionally abused: Not on file     Physically abused: Not on file     Forced sexual activity: Not on file   Other Topics Concern     Not on file   Social History Narrative    Has 4 sons. Has 7 grandchildren. Has 1 great grandchild.     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     Has been  63 years in 2018.     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        Current Meds:  Current Outpatient Medications   Medication Sig Dispense Refill     albuterol (PROAIR HFA;PROVENTIL HFA;VENTOLIN HFA) 90 mcg/actuation inhaler Inhale 2 puffs every 6 (six) hours as needed for wheezing or shortness of breath. Use with spacer 11 Inhaler 11     cholecalciferol, vitamin D3, 1,000 unit tablet Take 1,000 Units by mouth daily.       furosemide (LASIX) 20 MG tablet Take 2 tablets (40 mg total) by mouth daily. 60 tablet 0     levothyroxine (SYNTHROID, LEVOTHROID) 175 MCG tablet TAKE 1 TABLET BY MOUTH EVERY MORNING AT 6 AM 90 tablet 0     memantine (NAMENDA) 5 MG tablet 5 mg daily.  1     metFORMIN (GLUCOPHAGE) 500 MG tablet Take 1 tablet (500 mg total) by mouth 2 (two) times a day with meals. 90 tablet 3     multivitamin with minerals (THERA-M) 9 mg iron-400 mcg Tab tablet Take 1 tablet by mouth daily.       niacin 500 MG tablet Take 500 mg by mouth.       rivaroxaban 20 mg Tab Take 1 tablet (20 mg total) by mouth daily with supper. 90 tablet 3     sotalol (BETAPACE) 80 MG tablet Take 1 tablet (80 mg total) by mouth 2 (two)  times a day. 180 tablet 3     tiotropium bromide (SPIRIVA WITH HANDIHALER INHL) Inhale 2 Inhalation daily.       No current facility-administered medications for this visit.        Physical Exam:  /64   Pulse 63   Resp 17   Wt 187 lb (84.8 kg)   SpO2 93% Comment: RA  BMI 25.90 kg/m    Gen: elderly man, alert, oriented, no distress  HEENT: nasal turbinates are unremarkable, no cervical or supraclavicular lymphadenopathy; no stridor  CV: RRR, no M/G/R  Resp: equal bilateral air entry, breath sounds decreased but clear throughout, no focal crackles or wheezes. Talking in full sentences w/ no respiratory distress  Abd: soft, nontender, no palpable organomegaly  Skin: no apparent rashes  Ext: no cyanosis, clubbing; (+) BLE edema  Neuro: alert, nonfocal    Labs: personally reviewed in the EMR.     Imaging studies: personally reviewed. Below are the Radiology interpretations.  #.  CT chest, noncontrast, 3/15/2019:  LUNGS AND PLEURA: Severe emphysema. There is scarring in the lateral left upper lobe. Mild cylindrical bronchiectasis in both lower lobes. Focal atelectasis or mild consolidation in the posterior costophrenic sulcus on the right. No pulmonary nodules. Trace left pleural effusion.  MEDIASTINUM: No lymphadenopathy. Moderate coronary artery calcification.  LIMITED UPPER ABDOMEN: Trace perihepatic ascites. Splenomegaly.  MUSCULOSKELETAL: There are multiple old right rib fractures.    #.  CXR, 2 view, 8/9/2019:  Severe emphysema noted with bullous changes in the upper lobes. Minimal midlung scarring in the left midlung. No signs of pneumonia or failure. Heart and pulmonary vascularity are normal.    PFT's   #.  10/2018: Severe airflow obstruction with clinically significant bronchodilator response, normal lung volumes, no hyperinflation, but notable air trapping. FEV1/FVC is 0.52 and is reduced. FEV1 is 1.27 L (45 % predicted) and is reduced. FVC is 2.46 L (83 % predicted) and reduced. There was  improvement in spirometry after a single inhaled dose of bronchodilator. TLC is 7 L (96 % predicted) and is normal.  Patient could not perform DLCO maneuver.    #.  03/2019: Severe airflow obstruction, normal diffusion capacity. FEV1/FVC is 0.58 and is reduced. FEV1 is 1.39 L (49 % predicted) and is reduced. FVC is 2.38 L (62 % predicted) and reduced. DLCO is 59 % predicted and is normal when it is corrected for hemoglobin.

## 2021-05-31 NOTE — PATIENT INSTRUCTIONS - HE
Increase furosemide to 40 mg ( two tablets in the am)  Dr. Parker will follow up with lab results Monday

## 2021-05-31 NOTE — TELEPHONE ENCOUNTER
Enlargement of abdomen, and difficulty breathing.  Going on for months, and has a script for Spiriva . Does not seem to work that well.    He is calling to make an appointment   With Dr. Rivas , for follow up on thakkar abdomen, and medication for COPD.    Appointment made.    Tyesha Dixon RN  [facility] Triage/refill nurse

## 2021-06-01 NOTE — TELEPHONE ENCOUNTER
Refill Approved    Rx renewed per Medication Renewal Policy. Medication was last renewed on 8/9/19.    Siri Bolaños, [facility] Triage/Med Refill 9/8/2019     Requested Prescriptions   Pending Prescriptions Disp Refills     furosemide (LASIX) 20 MG tablet [Pharmacy Med Name: Furosemide Oral Tablet 20 MG] 60 tablet 0     Sig: TAKE 2 TABLETS (40 MG) BY MOUTH ONCE DAILY       Diuretics/Combination Diuretics Refill Protocol  Passed - 9/8/2019  8:56 PM        Passed - Visit with PCP or prescribing provider visit in past 12 months     Last office visit with prescriber/PCP: 8/29/2019 Sandro Parker MD OR same dept: 8/29/2019 Sandro Parker MD OR same specialty: 8/29/2019 Sandro Parkre MD  Last physical: Visit date not found Last MTM visit: Visit date not found   Next visit within 3 mo: Visit date not found  Next physical within 3 mo: Visit date not found  Prescriber OR PCP: Sandro Parker MD  Last diagnosis associated with med order: 1. VILLEGAS (dyspnea on exertion)  - furosemide (LASIX) 20 MG tablet [Pharmacy Med Name: Furosemide Oral Tablet 20 MG]; TAKE 2 TABLETS (40 MG) BY MOUTH ONCE DAILY  Dispense: 60 tablet; Refill: 0    If protocol passes may refill for 12 months if within 3 months of last provider visit (or a total of 15 months).             Passed - Serum Potassium in past 12 months      Lab Results   Component Value Date    Potassium 4.5 08/29/2019             Passed - Serum Sodium in past 12 months      Lab Results   Component Value Date    Sodium 143 08/29/2019             Passed - Blood pressure on file in past 12 months     BP Readings from Last 1 Encounters:   08/29/19 102/57             Passed - Serum Creatinine in past 12 months      Creatinine   Date Value Ref Range Status   08/29/2019 1.20 0.70 - 1.30 mg/dL Final

## 2021-06-02 VITALS — WEIGHT: 193.4 LBS | HEIGHT: 72 IN | BODY MASS INDEX: 26.19 KG/M2

## 2021-06-02 VITALS — BODY MASS INDEX: 26.31 KG/M2 | WEIGHT: 194 LBS

## 2021-06-02 VITALS — BODY MASS INDEX: 26.35 KG/M2 | WEIGHT: 191.6 LBS

## 2021-06-02 VITALS — WEIGHT: 190 LBS | BODY MASS INDEX: 25.73 KG/M2 | HEIGHT: 72 IN

## 2021-06-02 VITALS — BODY MASS INDEX: 26.99 KG/M2 | HEIGHT: 71 IN | WEIGHT: 192.8 LBS

## 2021-06-02 VITALS — BODY MASS INDEX: 27.44 KG/M2 | HEIGHT: 71 IN | WEIGHT: 196 LBS

## 2021-06-02 VITALS — WEIGHT: 194 LBS | BODY MASS INDEX: 26.28 KG/M2 | HEIGHT: 72 IN

## 2021-06-02 VITALS — WEIGHT: 193 LBS | HEIGHT: 72 IN | BODY MASS INDEX: 26.14 KG/M2

## 2021-06-02 VITALS — WEIGHT: 190.2 LBS | BODY MASS INDEX: 26.16 KG/M2

## 2021-06-02 VITALS — BODY MASS INDEX: 27.44 KG/M2 | WEIGHT: 196 LBS | HEIGHT: 71 IN

## 2021-06-02 VITALS — WEIGHT: 193.5 LBS | BODY MASS INDEX: 26.21 KG/M2 | HEIGHT: 72 IN

## 2021-06-02 VITALS — BODY MASS INDEX: 26.71 KG/M2 | WEIGHT: 194.2 LBS

## 2021-06-02 NOTE — PATIENT INSTRUCTIONS - HE
"Today you were seen for follow up of your COPD. I am really glad you are doing better.     Plan:    Continue with your double strength Advair. If you are doing well, you can certain decrease your frequency of use to once daily. However, if you feel that your cough & breathing issues are getting worse, restart using it twice a day.    If you are using your Advair once a day but get a respiratory infection, you should temporarily increase the dose to twice a day until you are better.    Have your albuterol around \"just in case\". It is particularly helpful to use when you have an acute respiratory illness.     If you are sick, you might feel better if you use your albuterol before your Advair    Please, remember that appropriate use of inhalers is essential to their efficacy. If you cannot remember the instructions on how to properly use the prescribed inhalers, you can visit your pharmacist to request a demonstration, or you can review a video online. There are many helpful videos on YouTube that you can access to review instructions and demonstrations on the use of different inhalers. You should exercise common sense when looking for videos -- best and most informative inhaler videos come from health care organizations.    You should follow up in 12 months, unless you are doing well & your regular doctor is managing your COPD. Just call if you have any issues.     If you have any questions or concerns, please, call our clinic at [phone].         "

## 2021-06-02 NOTE — PROGRESS NOTES
"Pulmonary Clinic Outpatient Follow-Up  10/4/2019     Assessment and Plan:   86 year old man with the most pertinent history of COPD with chronic cough, presenting for follow up of his chronic cough.    #.  COPD with chronic cough.  Symptoms markedly improved since initiation of Advair.  Patient is doing well on higher dose of Advair.      Advair renewed    Educated patient that some people do well using Advair once a day, and it is reasonable for him to try that.  However, I cautioned patient that if his breathing gets worse after he decreases the frequency of his Advair use, he should go back to using it twice a day    Educated patient that he is likely to remain on inhaler medication for the rest of his life due to his underlying COPD    Since patient is doing well, he would like to continue following with his PCP from now on.  I am happy to see him again if new issues arise or his breathing gets worse.    Patient should return to clinic in 1 year or as needed for a follow up.    Cortney Salas MD  Pulmonary and Critical Care   ______________________________________________________________________________    CC: Follow-up chronic cough    HPI:   Campos Ocampo is a 86 y.o. never smoker with history of COPD w/ chronic cough & dementia, presenting today for follow up of his COPD. Last seen 8/14/19 & started on Advair in addition to Spiriva, continued on PRN albuterol and Flonase.     Today patient reports that he is doing \"great\".  States that Advair has been fantastic for his symptoms.  He states that since starting Advair and having his PCP increase the dose, his cough has completely resolved and his breathing is markedly improved.  He is doing well enough that he has stopped his Spiriva (did not feel that it was helping).  He is used his albuterol once since I saw him a month ago.  At times he has even tried taking Advair once daily, and felt that his symptoms of cough and shortness of breath did not get worse or " recur.    ROS:  Review of Systems - 10-point ROS reviewed and found to be negative w/ exception as detailed in the HPI.    PMH:  Patient Active Problem List    Diagnosis Date Noted     Bilateral lower extremity edema 05/30/2019     VILLEGAS (dyspnea on exertion) 12/21/2018     Persistent atrial fibrillation      Thrombocytopenia (H) 11/05/2018     Low hemoglobin 11/01/2018     Hypersomnia 11/01/2018     Severe chronic obstructive pulmonary disease (H) 11/01/2018     History of kidney stones - s/p right lithotripsy and cystoscopy with urology 8/10/2017 09/11/2018     Hyperlipidemia LDL goal <100 09/06/2018     BPH (benign prostatic hyperplasia) 09/06/2018     Essential hypertension 11/05/2015     Type 2 diabetes mellitus without complication (H) 11/05/2015     Erectile dysfunction, unspecified erectile dysfunction type 06/11/2015     Environmental allergies 12/10/2014     Hypothyroidism 02/11/2011     PSH:  Past Surgical History:   Procedure Laterality Date     CARDIOVERSION  02/11/2019    nsr x 1 min, then afib     CHOLECYSTECTOMY       eyelid surgery Left 04/2018     KIDNEY STONE SURGERY  2018     Allergies:  Allergies   Allergen Reactions     Penicillins Unknown     Family HX:  Family History   Problem Relation Age of Onset     Stroke Mother         cerebral hemorrage     Emphysema Father      Social Hx:  Social History     Socioeconomic History     Marital status:      Spouse name: Not on file     Number of children: 4     Years of education: Not on file     Highest education level: Not on file   Occupational History     Not on file   Social Needs     Financial resource strain: Not on file     Food insecurity:     Worry: Not on file     Inability: Not on file     Transportation needs:     Medical: Not on file     Non-medical: Not on file   Tobacco Use     Smoking status: Never Smoker     Smokeless tobacco: Never Used   Substance and Sexual Activity     Alcohol use: Yes     Alcohol/week: 1.0 standard drinks      Types: 1 Glasses of wine per week     Drug use: No     Sexual activity: Never   Lifestyle     Physical activity:     Days per week: Not on file     Minutes per session: Not on file     Stress: Not on file   Relationships     Social connections:     Talks on phone: Not on file     Gets together: Not on file     Attends Jehovah's witness service: Not on file     Active member of club or organization: Not on file     Attends meetings of clubs or organizations: Not on file     Relationship status: Not on file     Intimate partner violence:     Fear of current or ex partner: Not on file     Emotionally abused: Not on file     Physically abused: Not on file     Forced sexual activity: Not on file   Other Topics Concern     Not on file   Social History Narrative    Has 4 sons. Has 7 grandchildren. Has 1 great grandchild.     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     Has been  63 years in 2018.     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      Current Meds:  Current Outpatient Medications   Medication Sig Dispense Refill     albuterol (PROAIR HFA;PROVENTIL HFA;VENTOLIN HFA) 90 mcg/actuation inhaler Inhale 2 puffs every 4 (four) hours as needed for wheezing or shortness of breath. Use with spacer 1 Inhaler 11     cholecalciferol, vitamin D3, 1,000 unit tablet Take 1,000 Units by mouth daily.       fluticasone propion-salmeterol (ADVAIR DISKUS) 250-50 mcg/dose DISKUS Inhale 1 puff 2 (two) times a day. 1 each 0     furosemide (LASIX) 20 MG tablet TAKE 2 TABLETS (40 MG) BY MOUTH ONCE DAILY 180 tablet 3     levothyroxine (SYNTHROID, LEVOTHROID) 175 MCG tablet TAKE 1 TABLET BY MOUTH EVERY MORNING AT 6 AM 90 tablet 0     memantine (NAMENDA) 5 MG tablet 5 mg daily.  1     metFORMIN (GLUCOPHAGE) 500 MG tablet Take 1 tablet (500 mg total) by mouth 2 (two) times a day with meals. 90 tablet 3      multivitamin with minerals (THERA-M) 9 mg iron-400 mcg Tab tablet Take 1 tablet by mouth daily.       rivaroxaban 20 mg Tab Take 1 tablet (20 mg total) by mouth daily with supper. 90 tablet 3     sotalol (BETAPACE) 80 MG tablet Take 1 tablet (80 mg total) by mouth 2 (two) times a day. 180 tablet 3     No current facility-administered medications for this visit.      Physical Exam:  /62   Pulse 68   Resp 20   Wt 180 lb 4.8 oz (81.8 kg)   SpO2 97% Comment: RA  BMI 24.97 kg/m    Gen: elderly man, alert, oriented, no distress  HEENT: EOMI, MMM, no stridor, no cough  CV: RRR, no M/G/R  Resp: equal bilateral air entry, breath sounds clear throughout, no focal crackles or wheezes. Talking in full sentences w/ no respiratory distress  Abd: soft, nontender, no palpable organomegaly  Skin: no apparent rashes  Ext: no cyanosis, clubbing or edema  Neuro: alert, nonfocal    Imaging studies: personally reviewed. Below are the Radiology interpretations.  #.  CT chest, noncontrast, 3/15/2019:  LUNGS AND PLEURA: Severe emphysema. There is scarring in the lateral left upper lobe. Mild cylindrical bronchiectasis in both lower lobes. Focal atelectasis or mild consolidation in the posterior costophrenic sulcus on the right. No pulmonary nodules. Trace left pleural effusion.  MEDIASTINUM: No lymphadenopathy. Moderate coronary artery calcification.  LIMITED UPPER ABDOMEN: Trace perihepatic ascites. Splenomegaly.  MUSCULOSKELETAL: There are multiple old right rib fractures.     #.  CXR, 2 view, 8/9/2019:  Severe emphysema noted with bullous changes in the upper lobes. Minimal midlung scarring in the left midlung. No signs of pneumonia or failure. Heart and pulmonary vascularity are normal.     PFT's   #.  10/2018: Severe airflow obstruction with clinically significant bronchodilator response, normal lung volumes, no hyperinflation, but notable air trapping. FEV1/FVC is 0.52 and is reduced. FEV1 is 1.27 L (45 % predicted) and is  reduced. FVC is 2.46 L (83 % predicted) and reduced. There was improvement in spirometry after a single inhaled dose of bronchodilator. TLC is 7 L (96 % predicted) and is normal.  Patient could not perform DLCO maneuver.     #.  03/2019: Severe airflow obstruction, normal diffusion capacity. FEV1/FVC is 0.58 and is reduced. FEV1 is 1.39 L (49 % predicted) and is reduced. FVC is 2.38 L (62 % predicted) and reduced. DLCO is 59 % predicted and is normal when it is corrected for hemoglobin.

## 2021-06-03 VITALS — WEIGHT: 201 LBS | BODY MASS INDEX: 28.14 KG/M2 | HEIGHT: 71 IN

## 2021-06-03 VITALS — BODY MASS INDEX: 27.58 KG/M2 | WEIGHT: 197 LBS | HEIGHT: 71 IN

## 2021-06-03 VITALS — WEIGHT: 187 LBS | BODY MASS INDEX: 25.9 KG/M2

## 2021-06-03 VITALS
OXYGEN SATURATION: 97 % | BODY MASS INDEX: 24.97 KG/M2 | RESPIRATION RATE: 20 BRPM | SYSTOLIC BLOOD PRESSURE: 104 MMHG | WEIGHT: 180.3 LBS | HEART RATE: 68 BPM | DIASTOLIC BLOOD PRESSURE: 62 MMHG

## 2021-06-03 VITALS — WEIGHT: 193.6 LBS | BODY MASS INDEX: 26.81 KG/M2

## 2021-06-03 VITALS — WEIGHT: 197 LBS | HEIGHT: 71 IN | BODY MASS INDEX: 27.58 KG/M2

## 2021-06-03 VITALS — WEIGHT: 200 LBS | BODY MASS INDEX: 28 KG/M2 | HEIGHT: 71 IN

## 2021-06-03 VITALS — BODY MASS INDEX: 25.9 KG/M2 | WEIGHT: 187 LBS

## 2021-06-03 VITALS — WEIGHT: 184.6 LBS | BODY MASS INDEX: 25.57 KG/M2

## 2021-06-03 VITALS — WEIGHT: 194 LBS | BODY MASS INDEX: 26.87 KG/M2

## 2021-06-03 NOTE — TELEPHONE ENCOUNTER
I called patient back -- he will go back to Advair once his Dulera is out. Then he will watch his symptoms, & call back if he is still having issues w/ his voice. If that happens we can try switching his Spiriva+Advair combo to Anoro & see if taking away the steroid component would make any difference. O.B.

## 2021-06-03 NOTE — PROGRESS NOTES
Brief pulmonary note    Patient contacted our clinic to discuss some issues he has been having with Advair -- asked for a call back.      Briefly, this is a gentleman with newly diagnosed COPD who is cough and exertional dyspnea have improved dramatically since starting high dose Advair that he uses once a day for baseline control of his respiratory symptoms.  The new concern that he has is some changes in his voice since starting Advair.  He notes that his voice sounds and oxide of lower and sometimes he just sounds very hoarse.  He confirms to me that he does gargle his mouth and throat after using the medication.    His PCP suggested trying alternative medication such as Dulera or Breo Ellipta instead of Advair.    Explained to the patient that I would be happy to try different combination inhalers for him to see if his voice improves. Did explain to him that these medications do contain the same classes of components as Advair (inhaled corticosteroid and inhaled long-acting bronchodilator). It is likely that the inhaled corticosteroid is to blame for the change in Mr. Ocampo's voice, however in light of patient's clinically significant response to bronchodilator on his PFTs in 10/2018, I do believe that inhaled corticosteroid may be an important component to his overall therapy.    Plan:    I sent a prescription for high-dose Dulera to patient's pharmacy of choice.  He knows to give us a call if he is having any issues filling the medication due to his insurance.    If Dulera does not solve all of his problems, we will try Breo Ellipta    If voice changes persist even with Breo ellipta, plan to try Anoro (LABA/LAMA)    Cortney Salas

## 2021-06-04 VITALS
RESPIRATION RATE: 18 BRPM | DIASTOLIC BLOOD PRESSURE: 64 MMHG | WEIGHT: 180 LBS | TEMPERATURE: 98 F | HEART RATE: 70 BPM | BODY MASS INDEX: 24.41 KG/M2 | SYSTOLIC BLOOD PRESSURE: 119 MMHG | OXYGEN SATURATION: 92 %

## 2021-06-04 VITALS
WEIGHT: 175 LBS | SYSTOLIC BLOOD PRESSURE: 110 MMHG | TEMPERATURE: 95.4 F | RESPIRATION RATE: 12 BRPM | OXYGEN SATURATION: 97 % | DIASTOLIC BLOOD PRESSURE: 65 MMHG | HEART RATE: 65 BPM | BODY MASS INDEX: 23.73 KG/M2

## 2021-06-04 VITALS
BODY MASS INDEX: 24.17 KG/M2 | OXYGEN SATURATION: 88 % | SYSTOLIC BLOOD PRESSURE: 116 MMHG | RESPIRATION RATE: 20 BRPM | DIASTOLIC BLOOD PRESSURE: 62 MMHG | WEIGHT: 178.2 LBS | HEART RATE: 62 BPM

## 2021-06-04 VITALS
HEART RATE: 62 BPM | SYSTOLIC BLOOD PRESSURE: 120 MMHG | RESPIRATION RATE: 14 BRPM | BODY MASS INDEX: 23.7 KG/M2 | WEIGHT: 175 LBS | HEIGHT: 72 IN | DIASTOLIC BLOOD PRESSURE: 60 MMHG

## 2021-06-04 VITALS
SYSTOLIC BLOOD PRESSURE: 89 MMHG | RESPIRATION RATE: 16 BRPM | HEART RATE: 68 BPM | HEIGHT: 72 IN | DIASTOLIC BLOOD PRESSURE: 70 MMHG | WEIGHT: 173 LBS | BODY MASS INDEX: 23.43 KG/M2

## 2021-06-04 VITALS
TEMPERATURE: 97.3 F | BODY MASS INDEX: 25.06 KG/M2 | OXYGEN SATURATION: 94 % | HEART RATE: 67 BPM | RESPIRATION RATE: 16 BRPM | DIASTOLIC BLOOD PRESSURE: 60 MMHG | WEIGHT: 184.8 LBS | SYSTOLIC BLOOD PRESSURE: 108 MMHG

## 2021-06-04 VITALS
WEIGHT: 188 LBS | SYSTOLIC BLOOD PRESSURE: 97 MMHG | HEART RATE: 68 BPM | BODY MASS INDEX: 25.28 KG/M2 | BODY MASS INDEX: 25.5 KG/M2 | DIASTOLIC BLOOD PRESSURE: 70 MMHG | SYSTOLIC BLOOD PRESSURE: 107 MMHG | HEART RATE: 68 BPM | DIASTOLIC BLOOD PRESSURE: 70 MMHG | WEIGHT: 186.4 LBS

## 2021-06-04 VITALS
SYSTOLIC BLOOD PRESSURE: 92 MMHG | HEART RATE: 63 BPM | WEIGHT: 162 LBS | DIASTOLIC BLOOD PRESSURE: 58 MMHG | BODY MASS INDEX: 21.97 KG/M2 | OXYGEN SATURATION: 98 %

## 2021-06-04 VITALS
DIASTOLIC BLOOD PRESSURE: 60 MMHG | WEIGHT: 162 LBS | BODY MASS INDEX: 21.94 KG/M2 | HEART RATE: 55 BPM | OXYGEN SATURATION: 95 % | HEIGHT: 72 IN | SYSTOLIC BLOOD PRESSURE: 92 MMHG

## 2021-06-04 VITALS — WEIGHT: 172.4 LBS | DIASTOLIC BLOOD PRESSURE: 68 MMHG | SYSTOLIC BLOOD PRESSURE: 97 MMHG | BODY MASS INDEX: 18.94 KG/M2

## 2021-06-04 VITALS
BODY MASS INDEX: 23.41 KG/M2 | WEIGHT: 172.6 LBS | HEART RATE: 68 BPM | SYSTOLIC BLOOD PRESSURE: 90 MMHG | DIASTOLIC BLOOD PRESSURE: 72 MMHG

## 2021-06-04 VITALS
HEIGHT: 72 IN | HEART RATE: 68 BPM | DIASTOLIC BLOOD PRESSURE: 70 MMHG | WEIGHT: 185.4 LBS | SYSTOLIC BLOOD PRESSURE: 99 MMHG | BODY MASS INDEX: 25.11 KG/M2

## 2021-06-05 VITALS
HEART RATE: 64 BPM | SYSTOLIC BLOOD PRESSURE: 92 MMHG | WEIGHT: 158 LBS | RESPIRATION RATE: 16 BRPM | BODY MASS INDEX: 21.4 KG/M2 | HEIGHT: 72 IN | DIASTOLIC BLOOD PRESSURE: 62 MMHG

## 2021-06-05 VITALS
HEART RATE: 58 BPM | SYSTOLIC BLOOD PRESSURE: 94 MMHG | DIASTOLIC BLOOD PRESSURE: 60 MMHG | BODY MASS INDEX: 21.6 KG/M2 | WEIGHT: 163 LBS | OXYGEN SATURATION: 96 % | HEIGHT: 73 IN

## 2021-06-05 VITALS
RESPIRATION RATE: 10 BRPM | DIASTOLIC BLOOD PRESSURE: 50 MMHG | BODY MASS INDEX: 20.81 KG/M2 | SYSTOLIC BLOOD PRESSURE: 90 MMHG | HEIGHT: 73 IN | HEART RATE: 56 BPM | WEIGHT: 157 LBS

## 2021-06-05 NOTE — PATIENT INSTRUCTIONS - HE
Campos Ocampo,    It was a pleasure to see you today at the [facility].     My recommendations after this visit include:    Continue current medications.    Call if you have recurrence of A fib.    Follow up in 6 months, or sooner if needed.    Ashley Hutchinson, CNP  [facility], Electrophysiology  [phone]  EP nurses [phone]

## 2021-06-05 NOTE — PROGRESS NOTES
Chief Complaint   Patient presents with     Cough     x1wk, COPD, SOB (used advir, but no improvement), no chest pain or congestion       HPI:  Campos Ocampo is a 86 y.o. male with past medical history of COPD, type 2 diabetes, hyperlipidemia, hypertension who presents today complaining of cough and shortness of breath for the past 1 week.  Patient denies any known fevers or chills.  He denies any chest pain or nasal congestion.  She reports a sensation that he needs to gasp for breath at times.  He has been taking Advair without any improvement.    History obtained from the patient.    Problem List:  2019-05: Bilateral lower extremity edema  2018-12: VILLEGAS (dyspnea on exertion)  2018-11: Thrombocytopenia (H)  2018-11: Low hemoglobin  2018-11: Hypersomnia  2018-11: Severe chronic obstructive pulmonary disease (H)  2018-09: History of kidney stones - s/p right lithotripsy and   cystoscopy with urology 8/10/2017  2018-09: Hyperlipidemia LDL goal <100  2018-09: BPH (benign prostatic hyperplasia)  2015-11: Essential hypertension  2015-11: Type 2 diabetes mellitus without complication (H)  2015-06: Erectile dysfunction, unspecified erectile dysfunction type  2014-12: Environmental allergies  2011-02: Hypothyroidism  Persistent atrial fibrillation      Past Medical History:   Diagnosis Date     Atrial fibrillation (H)      Diabetes mellitus (H)      VILLEGAS (dyspnea on exertion) 12/21/2018     Hyperlipidemia LDL goal <100 9/6/2018     Hypertension      Ptosis, left eyelid      Severe chronic obstructive pulmonary disease (H) 11/1/2018     Shingles        Social History     Tobacco Use     Smoking status: Never Smoker     Smokeless tobacco: Never Used   Substance Use Topics     Alcohol use: Yes     Alcohol/week: 1.0 standard drinks     Types: 1 Glasses of wine per week       Review of Systems   Constitutional: Negative for chills and fever.   HENT: Negative for congestion.    Respiratory: Positive for cough and shortness of  breath. Negative for wheezing.    Cardiovascular: Negative for chest pain.   Neurological: Negative for dizziness.       Vitals:    01/11/20 1021 01/11/20 1126 01/11/20 1127   BP: 131/68     Pulse: 67 75    Resp: 16     Temp: 98.2  F (36.8  C)     TempSrc: Oral     SpO2: (!) 89% (!) 85% (!) 82%       Physical Exam  Vitals signs and nursing note reviewed.   Constitutional:       General: He is not in acute distress.     Appearance: He is well-developed. He is not diaphoretic.   HENT:      Head: Normocephalic and atraumatic.      Right Ear: External ear normal.      Left Ear: External ear normal.   Eyes:      General:         Right eye: No discharge.         Left eye: No discharge.      Conjunctiva/sclera: Conjunctivae normal.   Cardiovascular:      Rate and Rhythm: Normal rate and regular rhythm.      Heart sounds: Normal heart sounds.   Pulmonary:      Effort: Pulmonary effort is normal. No respiratory distress.      Breath sounds: Normal breath sounds. No wheezing or rales.   Neurological:      Mental Status: He is alert.   Psychiatric:         Behavior: Behavior normal.         Thought Content: Thought content normal.         Judgment: Judgment normal.       Radiology:  Xr Chest 2 Views    Result Date: 1/11/2020  EXAM DATE:         01/11/2020 EXAM: X-RAY CHEST, 2 VIEWS, FRONTAL AND LATERAL LOCATION: [facility] DATE/TIME: 1/11/2020 10:30 AM INDICATION: Copd. hypoxic. clear lung sounds. cough x 1 week COMPARISON: 8/9/2019 IMPRESSION: Heart size is normal. Calcified aortic arch. Bands of linear scarring are present in the left lateral midlung, though there are new basilar opacities, left greater than right, concerning for developing infectious infiltrate. The upper lung zones are clear. No visible pneumothorax.       Clinical Decision Making:  Patient's was hypoxic upon rooming. I did recommend that he be evaluated in the ED because he was not vitally stable. He declined this request, so I began a  workup here in the clinic. CXR found that he did have bilateral pneumonia. He received a duoneb in the clinic, and his oxygenation had no improvement. After a diagnosis was made I again recommended that the patient be directly admitted. The patient was agreeable. I spoke to the Hospitalist at [facility], and the admission was accepted. Patient was observed here with 2 L of 02 for 30 minutes until his room was ready for him.       1. Pneumonia of both lungs due to infectious organism, unspecified part of lung     2. Chronic obstructive pulmonary disease, unspecified COPD type (H)  ipratropium-albuterol 0.5-2.5 mg/3 mL nebulizer solution 3 mL (DUO-NEB)    XR Chest 2 Views   3. Hypoxia           There are no Patient Instructions on file for this visit.

## 2021-06-05 NOTE — PROGRESS NOTES
Hospital Follow-up Visit:    Assessment/Plan:     1. Pneumonia of both lungs due to infectious organism, unspecified part of lung  Patient here for hospital follow-up  Patient diagnosed with bilateral pneumonia community-acquired  Was given antibiotics and discharged home  Has now completed antibiotics and is at home  No fevers or chills  Continues to have a small residual cough  Considerably fatigued and we discussed the slow transition back to baseline  He showed anemia which was stable by like to recheck this in a couple weeks  Discussed with him and his wife who is present any worsening symptoms I want to blood work sooner  Any worsening edema or coughing further evaluate with a BNP for his history of heart failure  At this time continue with current treatment no medication changes  Discussed normal course of slow resolution and return back to baseline  Will need recheck platelets and hemoglobin if they continue to be low referral back to hematology       Subjective:     Campos Ocampo is a 86 y.o. male who presents for a hospital discharge follow up.  Patient was hospitalized with bilateral community-acquired pneumonia.  Started on antibiotics and discharged home.  He is here for follow-up today-he does not feel he is back to his baseline is been quite fatigued.  Discussed with him and his wife who is present slow transition back to baseline from a bilateral pneumonia at his age.  He does have history however of atrial fibrillation and congestive heart failure and if symptoms do not continue to improve we would at the further investigate.  He is not having worsening swelling in his lower extremities.  He is 4 pounds lighter than when I saw him in the fall.  I did not hear wheezing or rhonchi on lung examination.  Reviewed records with him and his wife were present.  They are in agreement to monitor.  I would like him to return in a couple weeks to recheck anemia sooner if any worsening symptoms or if he does  not continue to improve.  Any worsening symptoms consider white blood cell count and BNP level.      Hospital/Nursing Home/IP Rehab Facility: [facility]  Date of Admission: 1/11/2020  Date of Discharge: 1/13/2020  Reason(s) for Admission: Bilateral pneumonia            Do you have any problems taking your medication regularly?  None       Have you had any changes in your medication since discharge? None       Have you had any difficulty following your discharge or treatment plan?  No    Summary of hospitalization:  Hospital discharge summary reviewed  Diagnostic Tests/Treatments reviewed.  Follow up needed: Recheck hemogram in a couple weeks  Other Healthcare Providers Involved in Patient's Care: Patient Care Team:  Sandro Parker MD as PCP - General (Family Medicine)  Heaven Mcdaniel MD as Physician (Hematology and Oncology)  Kimmy He MD as Assigned PCP  Sandro Parker MD as Assigned PCP      Update since discharge: {improved   Information from family, SNF, care coordination: None    Post Discharge Medication Reconciliation: discharge medications reconciled, continue medications without change  Plan of care communicated with: patient and significant other    Objective:     Vitals:    01/22/20 1100   BP: 119/64   Pulse: 70   Resp: 18   Temp: 98  F (36.7  C)   SpO2: 92%   Weight: 180 lb (81.6 kg)         Physical Exam:  Physical Examination: General appearance - alert, well appearing, and in no distress and tired  Mental status - alert, oriented to person, place, and time  Eyes - pupils equal and reactive, extraocular eye movements intact  Neck - supple, no significant adenopathy  Lymphatics - no palpable lymphadenopathy, no hepatosplenomegaly  Chest - clear to auscultation, no wheezes, rales or rhonchi, symmetric air entry  Heart - normal rate, regular rhythm, normal S1, S2, no murmurs, rubs, clicks or gallops  Abdomen - soft, nontender, nondistended, no masses or  organomegaly  Musculoskeletal - no joint tenderness, deformity or swelling  Extremities - peripheral pulses normal, no pedal edema, no clubbing or cyanosis  Skin - normal coloration and turgor, no rashes, no suspicious skin lesions noted        Coding guidelines for this visit:  Type of Medical   Decision Making Face-to-Face Visit       within 7 Days of discharge Face-to-Face Visit        within 14 days of discharge   Moderate Complexity [id number]   High Complexity [id number]       Electronically signed by Sandro Parker MD 01/27/20 1:01 PM

## 2021-06-05 NOTE — TELEPHONE ENCOUNTER
Called and spoke with Campos, he is feeling better. Denies fever, no cough. Did sleep a lot this weekend but is feeling better since sending the message.   Appointment set up with Dr Parker for 2/12/20.   Notified he should be seen sooner if not feeling well or symptoms worsen, patient agrees.

## 2021-06-05 NOTE — PROGRESS NOTES
The clinic Community Health Worker spoke with the patient's wife Suzie today due to Hospital Discharge to discuss possible Clinic Care Coordination enrollment.  The service was described to the patient's wife and immediate needs were discussed, she is a bit overwhelmed with being the primary caregiver of Campos. Patient has PCP appt tomorrow 1/22/2020 with Dr. Parker to follow up, discuss any assistance if needed. The patient declined enrollement at this time.  The PCP is encouraged to refer in the future if the patient's needs change.

## 2021-06-05 NOTE — TELEPHONE ENCOUNTER
Refill Approved    Rx renewed per Medication Renewal Policy. Medication was last renewed on 1/25/19.    Siri Bolaños, [facility] Connection Triage/Med Refill 1/26/2020     Requested Prescriptions   Pending Prescriptions Disp Refills     levothyroxine (SYNTHROID, LEVOTHROID) 175 MCG tablet [Pharmacy Med Name: Levothyroxine Sodium Oral Tablet 175 MCG] 90 tablet 1     Sig: TAKE 1 TABLET BY MOUTH EVERY MORNING AT 6 AM       Thyroid Hormones Protocol Passed - 1/25/2020 11:32 AM        Passed - Provider visit in past 12 months or next 3 months     Last office visit with prescriber/PCP: 11/30/2018 Kimmy He MD OR same dept: 1/22/2020 Sandro Parker MD OR same specialty: 1/22/2020 Sandro Parker MD  Last physical: 2/7/2019 Last MTM visit: Visit date not found   Next visit within 3 mo: Visit date not found  Next physical within 3 mo: Visit date not found  Prescriber OR PCP: Kimmy He MD  Last diagnosis associated with med order: 1. Hypothyroidism  - levothyroxine (SYNTHROID, LEVOTHROID) 175 MCG tablet [Pharmacy Med Name: Levothyroxine Sodium Oral Tablet 175 MCG]; TAKE 1 TABLET BY MOUTH EVERY MORNING AT 6 AM  Dispense: 90 tablet; Refill: 1    2. Hypersomnia  - levothyroxine (SYNTHROID, LEVOTHROID) 175 MCG tablet [Pharmacy Med Name: Levothyroxine Sodium Oral Tablet 175 MCG]; TAKE 1 TABLET BY MOUTH EVERY MORNING AT 6 AM  Dispense: 90 tablet; Refill: 1    If protocol passes may refill for 12 months if within 3 months of last provider visit (or a total of 15 months).             Passed - TSH on file in past 12 months for patient age 12 & older     TSH   Date Value Ref Range Status   08/09/2019 1.19 0.30 - 5.00 uIU/mL Final

## 2021-06-06 NOTE — TELEPHONE ENCOUNTER
Patient Returning Call  Reason for call:  lmtcb  Information relayed to patient:    patient informed of findings and verbalize an understanding.    Patient has additional questions:  No  If YES, what are your questions/concerns:  NA    Okay to leave a detailed message?: No call back needed

## 2021-06-06 NOTE — PROGRESS NOTES
ASSESSMENT/PLAN  1. Congestive heart failure, unspecified HF chronicity, unspecified heart failure type (H)  Collage of symptoms seem consistent with congestive heart failure  Discussed with patient and his wife present increasing frusemide dosing  Continue with 40 mg in the a.m. and add 20 mg at noon  We will check labs today and follow-up based on results  We will suggest increasing the frusemide for a week and reassessing weight and edema  Discussed multiple different possible etiologies for the change and we will assess the BNP level kidney function and thyroid levels    2. Type 2 diabetes mellitus without complication, without long-term current use of insulin (H)  Refills of patient's Metformin sent to the pharmacy  A1c obtained at goal range  - metFORMIN (GLUCOPHAGE) 500 MG tablet; Take 1 tablet (500 mg total) by mouth 2 (two) times a day with meals.  Dispense: 90 tablet; Refill: 1  - Glycosylated Hemoglobin A1c    3. Edema, unspecified type  Patient has 1+ edema in the bilateral lower extremities  He has had weight gain in the last week that has been noted at home  Suspect congestive heart failure acute exacerbation will increase diuretic if no changes or improvements consideration for repeat echo  - BNP(B-type Natriuretic Peptide)  - Thyroid Cascade  - HM2(CBC w/o Differential)  - Comprehensive Metabolic Panel    4. Shortness of breath  Patient has noted some shortness of breath at night discussed with him using a second pillow and will work on removing fluid with increased diuretics  - BNP(B-type Natriuretic Peptide)  - Thyroid Cascade  - HM2(CBC w/o Differential)  - Comprehensive Metabolic Panel    5. VILLEGAS (dyspnea on exertion)  Please see #4 above  - furosemide (LASIX) 20 MG tablet; Take 3 tablets daily- 2 am and one at noon  Dispense: 270 tablet; Refill: 0    6. Atrial fibrillation, unspecified type (H)  Patient is currently rate controlled  On anticoagulation  Do not suspect this contributing to fluid  overload        SUBJECTIVE:   Chief Complaint   Patient presents with     Fatigue     Patient feels tired all the times, Patient takes two naps during the day.      Panic Attack     Mild panic attacks at night 2-3 times a week.     Chills     Patient gets chills after dinner time every day     Concerns     Patient is concerns that he gained 8 pounds in the last week     Campos Ocampo 86 y.o. male    Current Outpatient Medications   Medication Sig Dispense Refill     albuterol (PROAIR HFA;PROVENTIL HFA;VENTOLIN HFA) 90 mcg/actuation inhaler Inhale 2 puffs every 4 (four) hours as needed for wheezing or shortness of breath. Use with spacer 1 Inhaler 11     cholecalciferol, vitamin D3, 1,000 unit tablet Take 1,000 Units by mouth daily.       fluticasone propion-salmeterol (ADVAIR DISKUS) 250-50 mcg/dose DISKUS Inhale 1 puff 2 (two) times a day. 3 each 3     fluticasone propionate (FLONASE) 50 mcg/actuation nasal spray 2 sprays into each nostril daily as needed.        furosemide (LASIX) 20 MG tablet Take 3 tablets daily- 2 am and one at noon 270 tablet 0     guaiFENesin (ROBITUSSIN) 100 mg/5 mL syrup Take 10 mL (200 mg total) by mouth every 6 (six) hours as needed for cough or congestion.  0     levothyroxine (SYNTHROID, LEVOTHROID) 175 MCG tablet TAKE 1 TABLET BY MOUTH EVERY MORNING AT 6 AM 90 tablet 0     memantine (NAMENDA) 5 MG tablet 5 mg daily.  1     metFORMIN (GLUCOPHAGE) 500 MG tablet Take 1 tablet (500 mg total) by mouth 2 (two) times a day with meals. 90 tablet 1     multivitamin with minerals (THERA-M) 9 mg iron-400 mcg Tab tablet Take 1 tablet by mouth daily.       rivaroxaban (XARELTO) 20 mg tablet Take 1 tablet (20 mg total) by mouth daily with supper. 90 tablet 3     sotalol (BETAPACE) 80 MG tablet Take 1 tablet (80 mg total) by mouth 2 (two) times a day. 180 tablet 3     No current facility-administered medications for this visit.      Allergies: Penicillins   No LMP for male patient.    HPI:    Patient presenting with concerns over ongoing fatigue some difficulty breathing at night feeling cool taking extra daytime naps and weight gain.  Patient has some rhonchi on clinical examination as well as lower extremity edema-expresses some shortness of breath at night when lying down and getting up to go to the bathroom.  He feels he is gained 8 pounds in the last week he is up about 5 pounds on our scale in the clinic in the last month.  He has been cool at night after eating and is been taking more naps-discussed with him and his wife were present that I suspect congestive heart failure causing fluid overload leading to his multiple symptoms-we will increase his diuretics for a week and reassess.  We will be obtaining blood work today to make sure is not other reasons for the fluid overload we will assessing thyroid function BNP levels and kidney function.  Is looking for refills of his metformin his glucose levels have been at goal range for some time we will check A1c today  If BNP is largely elevated consider echo  Patient and his wife are in agreement to plan they will increase diuretics will obtain blood work and reassess based on results and response to diuretics in the next week    ROS: negative except as per HPI    OBJECTIVE:   The patient appears well, alert, oriented x 3, in no distress.  /60 (Patient Site: Left Arm, Patient Position: Sitting, Cuff Size: Adult Regular)   Pulse 67   Temp 97.3  F (36.3  C) (Oral)   Resp 16   Wt 184 lb 12.8 oz (83.8 kg)   SpO2 94%   BMI 25.06 kg/m      Lungs: clear, good air entry, no wheezes, minor rhonchi at left base  Cardiac: S1 and S2 normal, no murmurs, regular rate and rhythm.   Abdomen: normal bowel sounds, soft without tenderness  Extremities: show 1+ edema, normal peripheral pulses.   Neurological: normal, no focal findings.  Skin: clear, dry, no rashes/lesions  Psych- normal mood and affect      Pt states an understanding and agrees to the above  plan.  Greater than 25 minutes was spent today in interview and examination with Campos Ocampo with more than 50% of that time in counseling and coordination of care.

## 2021-06-06 NOTE — TELEPHONE ENCOUNTER
Patient and wife informed of recommendations. Agree with plan. Lab appointment set up for 3/23 for lab check. They will call in with update early next week and Dr Parker will determine at that time if OV is needed.

## 2021-06-06 NOTE — TELEPHONE ENCOUNTER
New Appointment Needed  What is the reason for the visit:    Same Date/Next Day Appt Request  What is the reason for your visit?: Heart Failure/Lung check/med check. Patient is needing to be seen before PCP first available appointment on 5/7/20.     Provider Preference: PCP only  How soon do you need to be seen?: As Soon as possible  Waitlist offered?: No  Okay to leave a detailed message:  Yes

## 2021-06-06 NOTE — TELEPHONE ENCOUNTER
Please have him increase furosemide to 40mg in the am and 40mg at noon- please have him make a lab appointment next week to check kidney and potassium levels- I will place order.  He can update us next week with swelling and we can determine if he needs to be seen in clinic at this time.  Dr. Parker

## 2021-06-06 NOTE — PROGRESS NOTES
Clinic Care Coordination Contact  Care Team Conversations     CPN received email from [facility] regarding patient and whether or not patient had CM. CPN looked into patient chart and noted that he was offered CCC in January, but wife declined the need.     Writer called Karen Yanez from [facility] ([phone].) as requested by Karen to discuss patient's status of CM.     Writer discussed that CCC was offered to patient and wife (Suzie); she declined at that time. Writer stated that another referral for CCC can be put in and CPN will outreach to patient to offer CC.     Karen Whatley will follow up with patient after his next office visit which is 3/18/2020; to which she will inquire if patient enrolled with CCC/ discuss the potential benefits of having CCC.       Clinic Care Coordination Contact  [facility]/Voicemail    Referral Source: Health Plan  Clinical Data: Care Coordinator Outreach  Outreach attempted x 1.  Left message on patient's voicemail with call back information and requested return call.  Plan: . Care Coordinator will try to reach patient again in 1-2 business days.        Chiquita Rodriguez   Clinical Product Navigator

## 2021-06-06 NOTE — TELEPHONE ENCOUNTER
Spoke with Campos yesterday, cancelled his appointment that was scheduled for 3/18/20. He has an upcoming appointment 3/27/20 for AWV but also had a med concern with his Furosemide. Taking 40 mg in the AM and 20 mg in the afternoon. He had stated this was helping some but was still having edema.   Kept AWV scheduled for 3/27 - if this would need to be changed to OV/med check.

## 2021-06-06 NOTE — TELEPHONE ENCOUNTER
Medication Question or Clarification  Who is calling: Pharmacy  What medication are you calling about (include dose and sig)?:   metFORMIN (GLUCOPHAGE) 500 MG tablet 90 tablet 1 2/12/2020     Sig - Route: Take 1 tablet (500 mg total) by mouth 2 (two) times a day with meals. - Oral    Sent to pharmacy as: metFORMIN 500 mg tablet (GLUCOPHAGE)    E-Prescribing Status: Receipt confirmed by pharmacy (2/12/2020 11:07 AM CST        Who prescribed the medication?: Sandro Parker MD    What is your question/concern?: Requesting quantity of 180 for a 90 day supply please  Requested Pharmacy: [id number]  Okay to leave a detailed message?: Yes

## 2021-06-06 NOTE — PROGRESS NOTES
Clinic Care Coordination Contact  [facility]/Voicemail       Clinical Data: Care Coordinator Outreach  Outreach attempted x 1.  Left message on patient's voicemail with call back information and requested return call.  Plan: Care Coordinator will try to reach patient again in 1-2 business days.    Next Outreach: 3/12/20

## 2021-06-06 NOTE — PATIENT INSTRUCTIONS - HE
Increase furosemide to 2 tablets in the am, one tablet at noon  New script sent to the pharmacy  We are checking labs and you will be contacted with results in next 1-2 days  Use a second pillow at night when sleeping  Elevate feet when able

## 2021-06-06 NOTE — TELEPHONE ENCOUNTER
----- Message from Sandro Parker MD sent at 2/13/2020  7:09 AM CST -----  Please inform patient that thyroid levels are normal.  Kidney function and liver function are normal.  Electrolytes are normal.  A1c is at goal range.  Hemoglobin levels are stable.  The BNP level is slightly elevated from prior, the plan will be to take the extra water pill as we discussed at noon for the next week and update be with symptoms at that time.

## 2021-06-06 NOTE — TELEPHONE ENCOUNTER
Left message to call back for: Results  Information to relay to patient:  LMTCB, Please relay message below from Dr. Parker.

## 2021-06-06 NOTE — PROGRESS NOTES
Clinic Care Coordination Contact    The Clinic Community Health Worker spoke with the patient today due to ED/Hospital Discharge to discuss possible Clinic Care Coordination enrollment.  The service was described to the patient and immediate needs were discussed.  The patient declined enrollment at this time.  The PCP is encouraged to refer in the future if the patient's needs change.

## 2021-06-07 NOTE — TELEPHONE ENCOUNTER
Upcoming Appointment Question  When is the appointment: Today  What is your appointment for?: Lab  Who is your appointment scheduled with?: Lab  What is your question/concern?: Caller is questioning if someone would go to the patient's apartment and draw labs. Caller is expecting a call back.  Okay to leave a detailed message?: Yes  [phone]

## 2021-06-08 NOTE — PROGRESS NOTES
Clinic Care Coordination Contact  [facility]/Voicemail       Clinical Data: Care Coordinator Outreach  Outreach attempted x 1.  Left message on patient's voicemail with call back information and requested return call.  Plan: Care Coordinator will try to reach patient again in 1-2 business days.    Next Outreach: 5/20/20

## 2021-06-08 NOTE — PATIENT INSTRUCTIONS - HE
Campos Ocampo,    My recommendations after this visit include:  - No changes to your medications.  Refills of spironolactone and bumex were sent to the pharmacy.    - You need to schedule a lab appointment at Dr. Parker's office  - Please reschedule phone visit with Ashley Hutchinson next week.  - Please schedule phone visit with me in 2 weeks.   - Continue to monitor for signs of retaining fluid (increasing weights, shortness of breath, swelling) and call with any concerns.  - Please call my nurse Mervat if you have any concerns: [phone]    Jennifer Nye, CNP

## 2021-06-08 NOTE — PROGRESS NOTES
Labs show improved hemoglobin and normal ferritn, b12 and reticulocyte count.  Low counts probably due to his hospitalizations and improving.  Will hold on bone marrow.  Will recheck CBC in 4 weeks.

## 2021-06-08 NOTE — TELEPHONE ENCOUNTER
Discussion with Dr. Davis, she desires patient to present for admission for IV diuretics and IV potassium replacement given his recent labs.   He is resistant to this plan, but with direct and persistent information, he is now agreeable to present to [facility] this afternoon for admission. Dr. Davis will contact the hospitalist for direct admission. sk/RN

## 2021-06-08 NOTE — TELEPHONE ENCOUNTER
Called patient to set up lab appointment in 4 weeks per GN order but patient would like to have this done at his PCP clinic.

## 2021-06-08 NOTE — PROGRESS NOTES
Clinic Care Coordination Contact  Community Health Worker Initial Outreach         Patient accepts CC: No, due to not wanting support from CCC at this time. The patient does understand that he can ask for assistance in the future.

## 2021-06-08 NOTE — TELEPHONE ENCOUNTER
Phone call to pharmacist and discussion of order from TAB to take the medication ONE TIME, in the morning, per her OV note from today.

## 2021-06-08 NOTE — PATIENT INSTRUCTIONS - HE
Campos Ocampo,    My recommendations after this visit include:  - No changes to your medications.    - Continue to monitor for signs of retaining fluid (increasing weights, shortness of breath, swelling) and call with any concerns.   - Follow up with me in 2 months.   - Please call my nurse Mervat if you have any concerns: [phone]    Jennifer Nye, CNP

## 2021-06-08 NOTE — PROGRESS NOTES
MTM Transition of Care Encounter  Assessment & Plan                                                     Post Discharge Medication Reconciliation Status: discharge medications reconciled and changed, per note/orders (see AVS)    Acute on chronic congestive heart failure, preserved ejection fraction: Improved. Continues to check weight and weight is stable, encouraged them to continue and limit salt. BMP was ok, but mag level was low likely due to diuretic use. Recommended starting mag oxide 400 mg HS (to avoid interaction with levothyroxine) and future mag level with other labs.   PLAN:   1. Start mag oxide 400 mg HS   2. Future mag level     Abdominal edema: Improved. Continue to monitor    Thrombocytopenia: Patient has appt on 6/9 with Dr. Mcdaniel.     COPD: Follows with pulm, last appointment 10/4/20. Continue inhalers and follow up with pulm.     Atrial Fibrillation: Stable. Continue to follow with cardiology.     Type 2 Diabetes:  well controlled. A1C at goal of <7%. FBG unknown if at goal  mg/dL since he is not testing sugars. Ok to not check, but consider checking BG in about 2 months. Would also recommend checking microalbuminuria. Will defer lipids to PCP -- due to his age and well controlled diabetes, may not be worth starting a statin. Not as aspirin due to Xarelto.   PLAN:   1. Future A1c and microalbuminuria     Hypothyroidism: Last TSH WNL. Continue current regimen.     Memory:  Per chart review, there is a note from pulm on 8/14/19 concerning his memory.   Appointment with Dr. Rivas on 7/5/2019 for evaluation of concerning behavior, namely succumbing to multiple online/telephone scams that led to depletion of financial assets and accumulation of significant debt.  Seems that patient has lied to his family regarding his activities, which seems to go back to 2015.  He had neurocognitive assessment in 2017 that failed to show any cognitive impairment --the final impression was that patient is a  very trusting gentleman leading him to continue falling victim t scams.  He has continued with his activity (falling for various scams and even convincing another resident at his senior living facility to wire transfers totaling over $13,000).  There is a concern that patient's baseline high functional status and high educational level has enabled him to bypass appropriate neurocognitive assessments.  I see that patient was referred for repeat neuropsychiatric assessment and psychiatry management.  Since that time patient was started on memantine, however, I do not see the appointment that led to this prescription.   I also do not see the appointment from Dr. Molina. Consider obtaining HETAL. Reviewed that I would discuss this medication with PCP and whether he would continue to prescribe.      Supplements: Ok to continue current supplements.       Follow Up  As needed with MTM     Subjective & Objective                                                       Campos Ocampo is a 86 y.o. male called for a transitions of care visit. he was discharged from [facility] on 5/28 for CHF. Spoke to wife Beth, received verbal permission from patient.     Patient consented to a telehealth visit: Yes    Chief Complaint: Had a dry cough last night but improved albuterol. Slept well.      Medication Adherence/Access: Beth managed medications. Uses a pill box to organize.      Acute on chronic congestive heart failure, preserved ejection fraction: Significant lower extremity edema and abdominal edema. Ongoing issue over the past month and he was previously admitted earlier this month and had another ER visits since then without any real progress.   Furosemide 20 mg and metolazone 2.5 mg switched to Bumex 1 mg two times a day per cardiology recommendations. Taking second at supper time, tries to remember 5pm. Started on  Spironolactone 25 mg daily.   Had follow up with cardiology on 5/29/20. Another follow up today.   Beth  reports ankles are better. Wearing compression stockings.   ECHO: Date 5/12/20, EF 60%  Pt does measure daily weights and writes down, stable.   Pt is trying following a low sodium diet.   Given mag and K replacement in hosptial.   Last mag = 1.5 on 6/1/20. No mag supplement.   BMP checked 6/1/20    Abdominal edema: Per patient increased abdominal girth over the last few months. Thought to be likely due to CHF.   US paracentesis 6 liters removed and will send for no growth. US of liver normal. Since home reports that abdominal swelling is better.     Thrombocytopenia: Seen by hematology in the past and it appears rec BM bx at some point. Appt on 6/9 with Dr. Mcdaniel.   Last platelets = 103 on 5/28/20    COPD: Current medications: Advair 250-50 1 puff two times a day and albuterol HFA PRN. Rinses mouth after Advair. Used albuterol once yesterday, otherwise uses PRN. Denies sputum production. He reports at night breathing is fine, during the day has issues only if walking or talking a lot.   Pt is now on oxygen only at night. Slept well last night.     Atrial Fibrillation: failed cardioversion last year and placed on flecainide initially but now on sotalol and rivaroxaban  Currently taking sotalol 80 mg two times a day and Xarelto 20 mg daily 6pm with supper.  Currently sees a cardiologist.  Denies significant bleeding/bruising.   [id number] score = 4.     Type 2 Diabetes: Currently taking metformin 500 mg two times a day.   Tests BG 0 times daily.   Last A1c checked 2/12/20 = 5.8%.   Microalbumin checked n/a  Is not taking statin. Last lipids checked n/a  Is not taking aspirin 81 mg     Hypothyroidism: Currently taking levothyroxine 175 mcg. Administration: AM. Last TSH checked 2/12/20.     Memory: Taking memantine 5 mg HS. Suggested by psych for his memory. Thinks it has help. Dr toure is leaving and she wonders if PCP will continue.     Supplements: Taking Vitamin D 1000 IU daily and multivitamin.   No results  found for: OYPRSJPQ29MG        PMH: reviewed in EPIC   Allergies/ADRs: reviewed in EPIC   Alcohol: reviewed in Epic   Tobacco:   Social History     Tobacco Use   Smoking Status Never Smoker   Smokeless Tobacco Never Used     Recent Vitals:   BP Readings from Last 3 Encounters:   05/29/20 97/68   05/28/20 97/75   05/26/20 99/70      Wt Readings from Last 3 Encounters:   05/29/20 172 lb 6.4 oz (78.2 kg)   05/28/20 171 lb 6.4 oz (77.7 kg)   05/26/20 185 lb 6.4 oz (84.1 kg)     ----------------    The patient was given a CMS standardized format medication action plan    I spent 17 minutes with this patient today;  . All changes were made via collaborative practice agreement with Sandro Parker MD. A copy of the visit note was provided to the patient's provider.     Nya Plasencia, Pharm.D., BCACP  Medication Therapy Management Pharmacist  [facility]    Telemedicine Visit Details    Type of service:  Telephone     Start Time: 11:08  End Time (time video/phone call stopped): 11:25    Originating Location (pt. Location): Home    Distant Location (provider location):  [facility] MEDICATION THERAPY MANAGEMENT [facility]    Mode of Communication:   Telephone     Current Outpatient Medications   Medication Sig Dispense Refill     albuterol (PROAIR HFA;PROVENTIL HFA;VENTOLIN HFA) 90 mcg/actuation inhaler Inhale 2 puffs every 4 (four) hours as needed for wheezing or shortness of breath. Use with spacer 1 Inhaler 11     bumetanide (BUMEX) 1 MG tablet Take 1 tablet (1 mg total) by mouth 2 (two) times a day. 60 tablet 11     cholecalciferol, vitamin D3, 1,000 unit tablet Take 1,000 Units by mouth daily.       fluticasone propion-salmeterol (ADVAIR DISKUS) 250-50 mcg/dose DISKUS Inhale 1 puff 2 (two) times a day. 3 each 3     levothyroxine (SYNTHROID, LEVOTHROID) 175 MCG tablet TAKE 1 TABLET BY MOUTH EVERY MORNING AT 6 AM 90 tablet 0     memantine (NAMENDA) 5 MG tablet Take 5 mg by mouth at  bedtime.   1     metFORMIN (GLUCOPHAGE) 500 MG tablet Take 1 tablet (500 mg total) by mouth 2 (two) times a day with meals. 180 tablet 1     multivitamin with minerals (THERA-M) 9 mg iron-400 mcg Tab tablet Take 1 tablet by mouth daily.       sotalol (BETAPACE) 80 MG tablet Take 1 tablet (80 mg total) by mouth 2 (two) times a day. 180 tablet 3     spironolactone (ALDACTONE) 25 MG tablet Take 1 tablet (25 mg total) by mouth daily. 30 tablet 11     XARELTO 20 mg tablet TAKE 1 TABLET DAILY WITH SUPPER 90 tablet 1     No current facility-administered medications for this visit.

## 2021-06-08 NOTE — TELEPHONE ENCOUNTER
----- Message from Winsome Davis MD sent at 5/22/2020  3:13 PM CDT -----  Regarding: Follow-up  Quinn Crowell;  Can you give Mr. Ocampo a call on Tuesday (5/26/20) to see how he's doing. During today's visit, he endorsed PND, progressive dyspnea, weight gain and abdominal distention. I have asked him to take metolazone 2.5 mg one time only on Monday, with repeat lab work on Tuesday. His furosemide was increased by his PMD this week to 60 gm two times a day and so far he's seen his weight plateau and now is down about 1/2 pound.     Thanks;  ~ Winsome

## 2021-06-08 NOTE — PROGRESS NOTES
"Campos Ocampo is a 86 y.o. male who is being evaluated via a billable telephone visit.      The patient has been notified of following:     \"This telephone visit will be conducted via a call between you and your physician/provider. We have found that certain health care needs can be provided without the need for a physical exam.  This service lets us provide the care you need with a short phone conversation.  If a prescription is necessary we can send it directly to your pharmacy.  If lab work is needed we can place an order for that and you can then stop by our lab to have the test done at a later time.    Telephone visits are billed at different rates depending on your insurance coverage. During this emergency period, for some insurers they may be billed the same as an in-person visit.  Please reach out to your insurance provider with any questions.    If during the course of the call the physician/provider feels a telephone visit is not appropriate, you will not be charged for this service.\"    Patient has given verbal consent to a Telephone visit? Yes    What phone number would you like to be contacted at? [phone]    Patient would like to receive their AVS by AVS Preference: Mail a copy.    Chief Complaint   Patient presents with     Hospital Visit Follow Up     Pt was seen at [facility] on 5/15/2020 for SOB- Pt states it is still short, nothing has helped- states gets terible SOB with exersion       Subjective:    Campos Ocampo is a 86 y.o. male with history of Atrial fibrillation (H), CHF (congestive heart failure) (H), Diabetes mellitus (H), VILLEGAS (dyspnea on exertion) (12/21/2018), Hyperlipidemia LDL goal <100 (9/6/2018), Hypertension, Kidney stones, Ptosis, left eyelid, Severe chronic obstructive pulmonary disease (H) (11/1/2018), Shingles, and Subdural hematoma (H).who presents for follow up ongoing shortness of breath.      He has been admitted to the hospital from 5/11/2022 5/13/2020 and had an " echocardiogram done and baseline labs done.  He was diuresed with IV Lasix and discharged on change of medication to Bumex.  Since his discharge he was seen further by Dr. Bolanos with a virtual visit and advised to go back to the hospital with persistent symptoms.  He was evaluated again and his shortness of breath was deemed to be noncardiac and COPD in origin and he was asked to try albuterol.    He feels that nothing is helping with his shortness of breath.  He is unable to comment if taking albuterol has really made any difference.  He is short of breath most of the time.  And walking a few steps makes him short of breath.  He denies any recent fevers.  He informs me that his appetite is normal.  He denies any rashes or bruising.  He denies any lymphadenopathy or lesions.    He informs me that he was gaining weight but now his weight is stable at 186 pounds.    Problem List, Past Medical History, Social History, Family History, Medications, and Allergies reviewed in "[employer].      Review of Systems - General ROS: negative  Hematological and Lymphatic ROS: negative  Gastrointestinal ROS: negative  Genito-Urinary ROS: negative  Dermatological ROS: negative      Objective:     There were no vitals taken for this visit.  Patient speech is normal with some shortness of breath noted.  However, he is able to talk in complete sentences.  At this time his mentation was fair.        Assessment/ Plan:  1. VILLEGAS (dyspnea on exertion)  Ambulatory referral to Cardiology   2. Low hemoglobin  Ambulatory referral to Oncology/Hematology Adult   3. Thrombocytopenia (H)  Ambulatory referral to Oncology/Hematology Adult   4. Severe chronic obstructive pulmonary disease (H)       I reviewed patient's chart in detail with him.  He definitely does have congestive heart failure with likely preserved ejection fraction as noted from echo.  However, it is commented that his echo was not an ideal study and it was challenging.  It was  suggested that-would consider transesophageal echocardiogram or cardiac MRI of further structural information would be clinically useful.    I also reviewed with him his last visit with Dr. Hutchinson.  At that time, it was suggested that his shortness of breath was likely due to his COPD.    Discussed with patient that he has had some low hemoglobin and has been evaluated by Dr. Mcdaniel.  It was suggested that if his hemoglobin is less than 11 or his platelet count is less than 100 then he should be followed back with hematology.  I am wondering if this could be because of his shortness of breath and would like him to follow back with them.    If cardiac and hematology causes are ruled out then patient should follow-up with pulmonary for further input.  It is noted that he had some benefit from oxygen therapy but does not qualify.     Minimal I did suggest that after walking if he feels short of breath, he should try albuterol and then reassess to see if it has made benefit.      Phone call duration:  14 minutes    Rebekah Rivas MD

## 2021-06-08 NOTE — TELEPHONE ENCOUNTER
"Contacted Campos to do a status check following the use of the Metolazone last week. He is currently using Lasix 60mg two times a day.He reports his wt is down 2# . To 285.4#  He denies much improvement in his breathing, and his abdomen is still quite distended. He was up last night four times due to poor ability to sleep and breathing issues. He finally ended up in the recliner at 4:30AM for the remainder of the night.  He states he does not use added salt to his meals. He is not sure about the sodium content of the items he consumes that may have sodium as added preservatives etc. He eats what he wants to, but \"does not add salt\".    Dr. Davis, what do you recommend at this time? sk/RN  "

## 2021-06-08 NOTE — TELEPHONE ENCOUNTER
----- Message from Sandro Parker MD sent at 6/11/2020  7:59 AM CDT -----  Please inform patient that magnesium level is low- I would like him to increase magnesium supplement to twice daily.

## 2021-06-08 NOTE — TELEPHONE ENCOUNTER
RN cannot approve Refill Request    RN can NOT refill this medication historical medication requested.       Elaine Collazo, [facility] Triage/Med Refill 5/12/2020    Requested Prescriptions   Pending Prescriptions Disp Refills     furosemide (LASIX) 20 MG tablet [Pharmacy Med Name: Furosemide Oral Tablet 20 MG] 270 tablet 0     Sig: take 2 tablets by mouth in the morning and take 1 tablet at noon       Diuretics/Combination Diuretics Refill Protocol  Passed - 5/10/2020 12:29 PM        Passed - Visit with PCP or prescribing provider visit in past 12 months     Last office visit with prescriber/PCP: 2/12/2020 Sandro Parker MD OR same dept: 2/12/2020 Sandro Parker MD OR same specialty: 2/12/2020 Sandro Parker MD  Last physical: Visit date not found Last MTM visit: Visit date not found   Next visit within 3 mo: Visit date not found  Next physical within 3 mo: Visit date not found  Prescriber OR PCP: Sandro Parker MD  Last diagnosis associated with med order: 1. VILLEGAS (dyspnea on exertion)  - furosemide (LASIX) 20 MG tablet [Pharmacy Med Name: Furosemide Oral Tablet 20 MG]; take 2 tablets by mouth in the morning and take 1 tablet at noon  Dispense: 270 tablet; Refill: 0    If protocol passes may refill for 12 months if within 3 months of last provider visit (or a total of 15 months).             Passed - Serum Potassium in past 12 months      Lab Results   Component Value Date    Potassium 3.5 05/12/2020             Passed - Serum Sodium in past 12 months      Lab Results   Component Value Date    Sodium 139 05/12/2020             Passed - Blood pressure on file in past 12 months     BP Readings from Last 1 Encounters:   05/12/20 110/73             Passed - Serum Creatinine in past 12 months      Creatinine   Date Value Ref Range Status   05/12/2020 1.12 0.70 - 1.30 mg/dL Final

## 2021-06-08 NOTE — PATIENT INSTRUCTIONS - HE
Campos Ocampo,    It was a pleasure to see you today at the [facility].     My recommendations after this visit include:    Continue medications for A fib: sotalol 80 mg two times a day and Xarelto 20 mg daily    Call if you have frequent or prolonged episodes of A fib    Follow up with Jennifer Nye CNP on 6/12/2020 and Dr. Davis on 7/6/2020  Follow up with me in 6 months, or sooner if needed    Ashley Hutchinson CNP  [facility], Electrophysiology  [phone]  EP nurses [phone]

## 2021-06-08 NOTE — PROGRESS NOTES
Received intake call for home oxygen at 12:37PM. Reviewed patient's chart; Patient qualifies under Medicare guidelines but we need a signed order and notes.  12:37PM- Spoke with care coordinator, Harrison, confirmed we received the intake and let him know I need a signed order and notes. He will ask the doctor to do those.   1:54PM- Called Harrison, he said the doctor is working on the notes and while we were on the phone I found them.   1:57PM- Called to offer choice and patient is okay with [facility] setting them up. Discussed equipment with patient and informed them that we would be to bedside with oxygen in the next 2 hours.

## 2021-06-08 NOTE — TELEPHONE ENCOUNTER
Left message to call back for: Appointment  Information to relay to patient:  Dr Parker not available to do Video visit this week. Will need to set up with another provider. Please assist in scheduling.

## 2021-06-08 NOTE — PROGRESS NOTES
Review of Systems - History obtained from the patient  General ROS: positive for  - weight gain  Psychological ROS: negative  Ophthalmic ROS: negative  ENT ROS: negative  Allergy and Immunology ROS: negative  Hematological and Lymphatic ROS: positive for - fatigue  Endocrine ROS: negative  Respiratory ROS: positive for - shortness of breath  Cardiovascular ROS: negative  Gastrointestinal ROS: negative  Genito-Urinary ROS: negative  Musculoskeletal ROS: negative  Neurological ROS: negative  Dermatological ROS: negative

## 2021-06-08 NOTE — PROGRESS NOTES
"Campos Ocampo is a 86 y.o. male who is being evaluated via a billable telephone visit.      The patient has been notified of following:     \"This telephone visit will be conducted via a call between you and your physician/provider. We have found that certain health care needs can be provided without the need for a physical exam.  This service lets us provide the care you need with a short phone conversation.  If a prescription is necessary we can send it directly to your pharmacy.  If lab work is needed we can place an order for that and you can then stop by our lab to have the test done at a later time.    Telephone visits are billed at different rates depending on your insurance coverage. During this emergency period, for some insurers they may be billed the same as an in-person visit.  Please reach out to your insurance provider with any questions.    If during the course of the call the physician/provider feels a telephone visit is not appropriate, you will not be charged for this service.\"    Patient has given verbal consent to a Telephone visit? Yes    What phone number would you like to be contacted at? [phone]    Patient would like to receive their AVS by AVS Preference: Ruddy.    Beto Salinas, DO    Assessment/Plan:     Problem List Items Addressed This Visit     Severe chronic obstructive pulmonary disease (H)    Chronic obstructive pulmonary disease with acute lower respiratory infection (H)     Possible COPD exacerbation.  Continue Advair and albuterol.  More worried for CHF exacerbation at this time.         Congestive heart failure, unspecified HF chronicity, unspecified heart failure type (H) - Primary     Given the weight gain, and lower leg swelling, sounds more consistent with a CHF exacerbation.  Will change back to furosemide, however increased to 60 mg twice a day instead of the previous 60 in the morning 40 in the afternoon.  Continue daily weights.  Currently at 188 " pounds, and normal weight is 185/186 pounds.  Will check back in with patient on Tuesday (3 days).  If symptoms worsen over that 3 days go to ER.         Relevant Medications    furosemide (LASIX) 20 MG tablet        Return in about 2 weeks (around 6/4/2020) for Recheck bloating.    Subjective:   86 y.o. male presents for increasing abdominal size and continued shortness of breath.  Patient has been to the emergency room twice in the past month.  Negative COVID testing.  Normal chest x-ray.  Did have mildly elevated BNP he and clear x-ray.  On the first visit though his weight was up.  As such they changed him from furosemide to Bumex at 2 mg twice a day.  Now for the past week he is noticed his abdominal circumference has increased by 2 inches.  His weight is up 2 to 3 pounds.  His shortness of breath has not improved.  He also notes it has not worsened.  Last night he did wake up very short of breath and had to stay sitting.  He did take albuterol which took about 20 minutes to help.  After about an hour he was able to lay back down to go back to sleep.  Has been having a little more swelling in his legs since changing over to the Bumex.  He has not missed any doses.        Review of Systems   Constitutional: Negative for chills and fever.   Respiratory: Positive for shortness of breath. Negative for cough, choking and wheezing.    Cardiovascular: Positive for leg swelling. Negative for chest pain.   Gastrointestinal: Positive for abdominal distention. Negative for abdominal pain.   Genitourinary: Negative for difficulty urinating.   Musculoskeletal: Negative for gait problem.   Neurological: Negative for dizziness, weakness and headaches.        History     Reviewed By Date/Time Sections Reviewed    Beto Salinas DO 5/21/2020  2:04 PM Medical, Surgical, Tobacco, Family, Socioeconomic    Paco Littlejohn LPN 5/21/2020  1:50 PM Tobacco           Objective:     Vitals:    05/21/20 1348   BP: 107/70    Pulse: 68   Weight: 188 lb (85.3 kg)     Physical Exam  No physical exam.  Secondary to pandemic this was handled by telephone   this note has been dictated using voice recognition software. Any grammatical or context distortions are unintentional and inherent to the software    Start: 2:06 PM   End: 2:25 PM

## 2021-06-08 NOTE — PROGRESS NOTES
"Campos Ocampo is a 86 y.o. male who is being evaluated via a billable telephone visit.      The patient has been notified of following:     \"This telephone visit will be conducted via a call between you and your physician/provider. We have found that certain health care needs can be provided without the need for a physical exam.  This service lets us provide the care you need with a short phone conversation.  If a prescription is necessary we can send it directly to your pharmacy.  If lab work is needed we can place an order for that and you can then stop by our lab to have the test done at a later time.    Telephone visits are billed at different rates depending on your insurance coverage. During this emergency period, for some insurers they may be billed the same as an in-person visit.  Please reach out to your insurance provider with any questions.    If during the course of the call the physician/provider feels a telephone visit is not appropriate, you will not be charged for this service.\"    Patient has given verbal consent to a Telephone visit? Yes    What phone number would you like to be contacted at? [phone]    Patient would like to receive their AVS by AVS Preference: Mail a copy.    Chief Complaint   Patient presents with     Hospital Visit Follow Up     Pt was seen at [facility] from 5/26 - 5/28/2020 for CHF     Hospital Follow-up Visit:    Assessment/Plan:     1. Hospital discharge follow-up     2. Dyspnea on exertion     3. NYHA class 3 heart failure with preserved ejection fraction (H)     4. Thrombocytopenia (H)     5. 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 S/P abdominal paracentesis       Patient is a very complex patient with repeat admission for congestive heart failure with ascites.  He is much improved after drainage of 6 L of fluid after paracentesis.  He is also now on nocturnal supplemental oxygen.  Continue current management and follow-up with cardiology today as scheduled.   "   Subjective:     Campos Ocampo is a 86 y.o. male who presents for a hospital discharge follow up.  He was admitted again in hospital with worsening of dyspnea and increasing abdominal girth.  Patient does have a history of heart failure with preserved ejection fraction, COPD    Ultrasound showed moderate ascites and 6 L of fluid was drained.  He was started on overnight supplemental oxygen.  He informs me that since his discharge he is doing really well.  He said his breathing is now much better.  He informs me his appetite is good.  His swelling in his legs is less.  He denies any current respiratory symptoms.  No recent fever.  No nausea or vomiting.  No skin lesions.      Hospital/Nursing Home/IP Rehab Facility: [facility]  Date of Admission: 05/26/2020  Date of Discharge: 05/28/2020  Reason(s) for Admission: Congestive heart failure, dyspnea, COPD, need for nocturnal oxygen            Do you have any problems taking your medication regularly?  None       Have you had any changes in your medication since discharge? None       Have you had any difficulty following your discharge or treatment plan?  No    Summary of hospitalization:  Hospital discharge summary reviewed  Diagnostic Tests/Treatments reviewed.  Follow up needed: Needs follow-up electrolytes and magnesium.  Has upcoming visit with cardiology today.  Other Healthcare Providers Involved in Patient's Care: Patient Care Team:  Sanrdo Parker MD as PCP - General (Family Medicine)  Heaven Mcdaniel MD as Physician (Hematology and Oncology)  Sandro Parker MD as Assigned PCP  Winsome Davis MD as Physician (Cardiology)      Update since discharge: {improved   Information from family, SNF, care coordination: no     Post Discharge Medication Reconciliation: discharge medications reconciled, continue medications without change  Plan of care communicated with: patient    Objective:     There were no vitals  filed for this visit.      Physical Exam:  Patient is speaking in complete sentences.  Alert and mentation is normal.      Coding guidelines for this visit:  Type of Medical   Decision Making Face-to-Face Visit       within 7 Days of discharge Face-to-Face Visit        within 14 days of discharge   Moderate Complexity [id number]   High Complexity [id number]       Electronically signed by Rebekah Rivas MD 05/31/20 3:39 PM     Phone call duration:  7 minutes    Rebekah Rivas MD

## 2021-06-08 NOTE — TELEPHONE ENCOUNTER
New Appointment Needed  What is the reason for the visit:    Inpatient follow up visit  Virtual visit  At what hospital or facility were you seen?:   [facility]  What is the reason you were seen?: Hypokalemia  What date were you admitted?: date: 05/26/20  What date were you discharged?: date: 05/28/20  What was the recommended timeframe for your follow up appointment?: 3-5  Provider Preference: PCP only, hold or restriction on provider's schedule- Please advise patient if he should be seen by partner in clinic.  How soon do you need to be seen?: Within 3-5 days  Waitlist offered?: No  Okay to leave a detailed message:  Yes    Please call patient to schedule INF- RN also asked if clinic could update after visit summary that patient will be contacted to schedule.

## 2021-06-08 NOTE — PROGRESS NOTES
Clinic Care Coordination Contact  [facility]/Voicemail       Clinical Data: Care Coordinator Outreach  Outreach attempted x 1.  Left message on patient's voicemail with call back information and requested return call.  Plan: Care Coordinator will try to reach patient again in 1-2 business days.    Next Outreach: 6/3/20

## 2021-06-08 NOTE — TELEPHONE ENCOUNTER
----- Message from Jeff Huitron sent at 5/22/2020  4:56 PM CDT -----  Regarding: [facility] PT / CLARIFICATION ON MEDICATION  General phone call:    Caller: Mora from [facility]     Primary cardiologist: Cece     Detailed reason for call: Mora states they need more specific directions on pt's metOLazone (ZAROXOLYN) 2.5 MG tablet medication. It only says take one tablet by mouth as needed, they need it more specific for insurance purposes.     Best phone number: [phone]    Best time to contact: Today if possible     Ok to leave a detailed message? No     Device? No     Additional Info:

## 2021-06-08 NOTE — TELEPHONE ENCOUNTER
New Appointment Needed  What is the reason for the visit:  Video visit, INF  Inpatient/ED Follow Up Appt Request  At what hospital or facility were you seen?: [facility]  What is the reason you were seen?: dyspnea  What date were you admitted?: date: 5/11/2020  What date were you discharged?: date: 5/13/2020  What was the recommended timeframe for your follow up appointment?: 3-5 days  Provider Preference: PCP only  How soon do you need to be seen?: by 5/18  Waitlist offered?: No  Okay to leave a detailed message:  Yes

## 2021-06-08 NOTE — TELEPHONE ENCOUNTER
Pt hospitalized with CHF. Call made to introduce [employer] tracker program for CHF tele-monitoring, no answer. RN will call back another time.

## 2021-06-08 NOTE — PROGRESS NOTES
[facility] Hematology and Oncology Progress Note    Patient: Campos Ocampo  MRN: [id number]  Date of Service: 06/09/2020        Reason for Visit    Chief Complaint   Patient presents with     Benign Hematology     Consult       Assessment and Plan    Increased anemia and thrombocytopenia  Dyspnea on exertion    Patient's previous anemia work-up was unrevealing.  Now with further drop in hemoglobin and platelet count.  This could be related to multiple hospitalizations for CHF recently.    We will recheck CBC, ferritin, iron saturation, B12 and reticulocyte count now.  We will contact him with the results.  If he remains anemic and there is no nutritional cause we will proceed with bone marrow aspirate and biopsy for further evaluation.    Reviewed bone marrow procedure and small risks with this.    Plan: Check labs this week  We will contact him with the results and then plan bone marrow if needed    Measurable disease: CBC    Current therapy: Observation          ECOG Performance   ECOG Performance Status: 1    Distress Assessment  Distress Assessment Score: No distress    Pain           Problem List    1. Low hemoglobin  HM1(CBC and Differential)    Reticulocytes    Ferritin    Iron and Transferrin Iron Binding Capacity    Vitamin B12        CC: Sandro Parker MD    ______________________________________________________________________________    History of Present Illness    Mr. Campos Ocampo is spoken to for a telephone visit.  He was seen last November for work-up of mild anemia and thrombocytopenia.  All lab work and urinalysis at that time was normal.  He is referred back to us for low counts.  He has had multiple hospitalizations recently for CHF.  He had 6 L of ascites removed as well.  Remains somewhat short of breath.  He denies any bleeding symptoms.  No blood in the stool or urine.  No dark stool or urine.  No change in his diet.    Pain Status  Currently in Pain: No/denies    Review of  "Systems    Constitutional  Constitutional (WDL): Exceptions to WDL  Fatigue: Fatigue relieved by rest  Neurosensory  Neurosensory (WDL): Exceptions to WDL  Ataxia: Asymptomatic, clinical or diagnostic observations only, intervention not indicated(Slow gait)  Cardiovascular  Cardiovascular (WDL): All cardiovascular elements are within defined limits  Pulmonary  Respiratory (WDL): Exceptions to WDL  Cough: Moderate symptoms, medical intervention indicated, limiting instrumental ADL  Gastrointestinal  Gastrointestinal (WDL): All gastrointestinal elements are within defined limits  Genitourinary  Genitourinary (WDL): All genitourinary elements are within defined limits  Integumentary  Integumentary (WDL): All integumentary elements are within defined limits  Patient Coping  Patient Coping: Accepting  Distress Assessment  Distress Assessment Score: No distress  Accompanied by  Accompanied by: Family Member(Wife on the phone too)    Past History  Past Medical History:   Diagnosis Date     Atrial fibrillation (H)      CHF (congestive heart failure) (H)      Diabetes mellitus (H)      VILLEGAS (dyspnea on exertion) 12/21/2018     Hyperlipidemia LDL goal <100 9/6/2018     Hypertension      Kidney stones      Ptosis, left eyelid      Severe chronic obstructive pulmonary disease (H) 11/1/2018     Shingles      Subdural hematoma (H)     traumatic after a fall         Past Surgical History:   Procedure Laterality Date     CARDIOVERSION  02/11/2019    nsr x 1 min, then afib     CHOLECYSTECTOMY       eyelid surgery Left 04/2018     KIDNEY STONE SURGERY  2018     US PARACENTESIS  5/27/2020       Physical Exam    Recent Vitals 6/3/2020   Height 6' 0\"   Weight 173 lbs   BSA (m2) 2 m2   BP 89/70   Pulse 68   Temp -   Temp src -   SpO2 -   Some recent data might be hidden     No exam is done.      Lab Results    No results found for this or any previous visit (from the past 168 hour(s)).    Imaging    Xr Chest 1 View Portable    Result " Date: 5/26/2020  EXAM: XR CHEST 1 VIEW PORTABLE LOCATION: [facility] DATE/TIME: 5/26/2020 7:38 PM INDICATION: Chest Pain COMPARISON: 05/15/2020     Heart size and pulmonary vascularity normal. Emphysema. Scarring within the left mid and lower lung field, unchanged. Minimal atelectasis right base. No new infiltrates or effusions. Old right rib fractures.    Xr Chest 1 View Portable    Result Date: 5/15/2020  EXAM: XR CHEST 1 VIEW PORTABLE LOCATION: [facility] DATE/TIME: 5/15/2020 12:24 PM INDICATION: shortness of breath COMPARISON: 05/11/2020, 01/11/2020     Multiple old right rib fractures. Bilateral apical lucency consistent with advanced emphysema or blebs. Left midlung scar. No definite left lung acute infiltrate. Improved right basilar infiltrate suggests atelectasis. Heart size normal. No effusion. No pulmonary edema.    Xr Chest 1 View Portable    Result Date: 5/11/2020  EXAM: XR CHEST 1 VIEW PORTABLE LOCATION: [facility] DATE/TIME: 5/11/2020 12:46 PM INDICATION: short of breath. Awaiting Covid 19 test results. COMPARISON: 1/11/2020.     There is linear atelectasis or scarring in the left midlung and at both lung bases. There is subtle increased airspace opacity at the right base and in the left mid to lower lung consistent with subtle pneumonia. The heart size and pulmonary vascularity are normal. There is probable coronary artery calcification. There are old healed fractures of the right posterior fifth-10th ribs. No acute fracture is identified. NOTE: ABNORMAL REPORT THE DICTATION ABOVE DESCRIBES AN ABNORMALITY FOR WHICH FOLLOW-UP IS NEEDED.  .    Us Abdomen Limited    Result Date: 5/27/2020  EXAM: US ABDOMEN LIMITED LOCATION: [facility] DATE/TIME: 5/27/2020 4:39 PM INDICATION: Abnormal liver function tests. Abdominal distention. COMPARISON: None. TECHNIQUE: Limited abdominal ultrasound. FINDINGS: GALLBLADDER: Removed BILE DUCTS: No biliary dilatation. The common duct  measures 6 mm. LIVER: Slightly small size to the liver. Liver is otherwise negative. No focal mass. RIGHT KIDNEY: No hydronephrosis. PANCREAS: The visualized portions are normal. Moderate ascites. Paracentesis to follow.     1.  Liver is slightly small in size but otherwise normal in appearance. 2.  No focal liver lesions. 3.  Moderate ascites, paracentesis to follow.    Us Paracentesis    Result Date: 5/27/2020  EXAM: 1. PARACENTESIS 2. ULTRASOUND GUIDANCE LOCATION: [facility] DATE/TIME: 5/27/2020 4:35 PM INDICATION: Ascites. PROCEDURE: Informed consent obtained. Time out performed. The abdomen was prepped and draped in a sterile fashion. 10 mL of 1 percent lidocaine was infused into local soft tissues. A 5 Guyanese catheter system was introduced into the abdominal ascites under ultrasound guidance. 6.0 liters of clear fluid was removed and sent to lab if requested. Patient tolerated procedure well. Ultrasound imaging was obtained and placed in the patient's permanent medical record.     1.  Status post ultrasound-guided paracentesis. Reference CPT Code: 32119        Signed by: Heaven Mcdaniel MD

## 2021-06-08 NOTE — PROGRESS NOTES
Review of Systems - ALL WNL    STILL HAVING shortness of breath AND NO DOCTORS CAN FIGURE IT OUT    NO OTHER CONCERNS    PHONE VISIT PREFERRED    Марина Nieves, CMA

## 2021-06-08 NOTE — PROGRESS NOTES
"Campos Ocampo is a 86 y.o. male who is being evaluated via a billable telephone visit.      The patient has been notified of following:     \"This telephone visit will be conducted via a call between you and your physician/provider. We have found that certain health care needs can be provided without the need for a physical exam.  This service lets us provide the care you need with a short phone conversation.  If a prescription is necessary we can send it directly to your pharmacy.  If lab work is needed we can place an order for that and you can then stop by our lab to have the test done at a later time.    Telephone visits are billed at different rates depending on your insurance coverage. During this emergency period, for some insurers they may be billed the same as an in-person visit.  Please reach out to your insurance provider with any questions.    If during the course of the call the physician/provider feels a telephone visit is not appropriate, you will not be charged for this service.\"    Patient has given verbal consent to a Telephone visit? Yes    What phone number would you like to be contacted at? [phone]    Patient would like to receive their AVS by AVS Preference: Ruddy.    Additional provider notes:   Hospital Follow-up Visit:    Hospital/Nursing Home/IP Rehab Facility: [facility]  Date of Admission: 5-11-20  Date of Discharge: 5-13-20  Reason(s) for Admission: WEIGHT GAIN    Was your hospitalization related to COVID-19? No  Problems taking medications regularly:  None  Medication changes since discharge: None  Problems adhering to non-medication therapy:  None    Summary of hospitalization:  Discharge summary unavailable  Diagnostic Tests/Treatments reviewed.  Follow up needed: none  Other Healthcare Providers Involved in Patient s Care:         None  Update since discharge: FEELS ABOUT THE SAME AS BEFORE GOING INTO THE HOSPTIAL      Post Discharge Medication " Reconciliation: discharge medications reconciled, continue medications without change.  Plan of care communicated with patient and family           HPI:   Hospital follow up.  Sent home on a different diuretic.  Subtle pneumonia on cxr not treated due to lack of sxs.  No fever.  Some chills after the evening meal.  Feels shortness of breath just sitting but not worse in bed.  Very difficult to walk because he has to stop.  Absolutely beat after changing from pajamas to clothes and then has to sit in a chair before he can walk again.   As soon as he got home he noted his breathing being labored and it is not getting any better.  The legs are setill swollen and no better than they were.        BP Readings from Last 3 Encounters:   05/15/20 97/70   05/13/20 109/64   02/12/20 108/60      O2 sat on room air on 5-13-20 93% and 94%    Results for orders placed or performed during the hospital encounter of 05/11/20   Basic Metabolic Panel   Result Value Ref Range    Sodium 141 136 - 145 mmol/L    Potassium 4.0 3.5 - 5.0 mmol/L    Chloride 100 98 - 107 mmol/L    CO2 39 (H) 22 - 31 mmol/L    Anion Gap, Calculation 2 (L) 5 - 18 mmol/L    Glucose 98 70 - 125 mg/dL    Calcium 7.9 (L) 8.5 - 10.5 mg/dL    BUN 23 8 - 28 mg/dL    Creatinine 1.01 0.70 - 1.30 mg/dL    GFR MDRD Af Amer >60 >60 mL/min/1.73m2    GFR MDRD Non Af Amer >60 >60 mL/min/1.73m2       XR Chest 1 View Portable (Order [id number])   Imaging   Date: 5/11/2020  Department: [facility]  Released By: Mark Mata RN (auto-released)  Authorizing: Joey Vanessa MD    Study Result     EXAM: XR CHEST 1 VIEW PORTABLE  LOCATION: [facility]  DATE/TIME: 5/11/2020 12:46 PM     INDICATION: short of breath. Awaiting Covid 19 test results.  COMPARISON: 1/11/2020.     IMPRESSION:   There is linear atelectasis or scarring in the left midlung and at both lung bases. There is subtle increased airspace opacity at the right base and in the left mid to lower  lung consistent with subtle pneumonia. The heart size and pulmonary   vascularity are normal. There is probable coronary artery calcification. There are old healed fractures of the right posterior fifth-10th ribs. No acute fracture is identified.     NOTE: ABNORMAL REPORT     THE DICTATION ABOVE DESCRIBES AN ABNORMALITY FOR        Assessment/Plan:  1. Hospital discharge follow-up      2. Dyspnea on exertion  No improvement    3. Congestive heart failure, unspecified HF chronicity, unspecified heart failure type (H)      4. Persistent atrial fibrillation      5. Severe chronic obstructive pulmonary disease (H)  chronic    PLAN:   Pt needs to be re-assessed due to worsening shortness of breath.  Need O2 sats, repeat chest xray, ? Re-admit or change treatment (pt may refuse hospitalization but is will to be re-evaluated)       Phone call duration:  25  minutes

## 2021-06-08 NOTE — PATIENT INSTRUCTIONS - HE
Thank you for allowing the [facility] to be a part of your care. Please pay close attention to the following medications as they have been changed during this visit.     1.) Please take metolazone 2.5 mg tablet ONE TIME ONLY in the morning. This medication will work together with your furosemide to increase your urination and decrease the amount of extra fluid that your body is holding on to.     2.) Please have repeat lab work done the following day. Testing has been ordered

## 2021-06-08 NOTE — PROGRESS NOTES
ROS: Patient reported he has had recent weight gain due to excessive fluid retention which caused his hospital admission.    All other ROS are WNL.

## 2021-06-08 NOTE — PROGRESS NOTES
Clinic Care Coordination Contact  Community Health Worker Initial Outreach         Patient accepts CC: No, due to already receiving too many calls from doctors and the clinics at this time. He does not want to work with CCC at this time. The patient then ended the call. The CHW will do no further outreach calls.

## 2021-06-08 NOTE — PROGRESS NOTES
"Campos Ocampo is a 86 y.o. male who is being evaluated via a billable telephone visit.      The patient has been notified of following:     \"This telephone visit will be conducted via a call between you and your physician/provider. We have found that certain health care needs can be provided without the need for a physical exam.  This service lets us provide the care you need with a short phone conversation.  If a prescription is necessary we can send it directly to your pharmacy.  If lab work is needed we can place an order for that and you can then stop by our lab to have the test done at a later time.    Telephone visits are billed at different rates depending on your insurance coverage. During this emergency period, for some insurers they may be billed the same as an in-person visit.  Please reach out to your insurance provider with any questions.    If during the course of the call the physician/provider feels a telephone visit is not appropriate, you will not be charged for this service.\"    Patient has given verbal consent to a Telephone visit? Yes    What phone number would you like to be contacted at? [phone]    Patient would like to receive their AVS by AVS Preference: Ruddy.    Luis Manuel Montes Jr., [facility]    Assessment/Plan:     Problem List Items Addressed This Visit     Chronic obstructive pulmonary disease with acute lower respiratory infection (H)     Still having slight worry for possible COPD exacerbation versus cardiac.         Congestive heart failure, unspecified HF chronicity, unspecified heart failure type (H)     His cardiologist gave him a trial of metolazone which actually helped him lose 3 additional pounds.  Has had slight improvement of symptoms according to his report but not as much as he would expect.             Return for recheck in 3-5 days if not improving..    Subjective:   86 y.o. male presents for dyspnea on exertion.  Over the weekend with the increase in his furosemide " he only lost 1/2 pound.  His cardiologist had him take a 2.5 mg dose of metolazone in which she lost an additional 3 pounds.  He states he has had slight improvement but not to the point he expected for 3 pound weight loss.  His weight is now down to 185.  Albuterol seems to give him some slight relief once in a while.  He is still using it.  Has not noticed any palpitations.  His leg swelling is now gone.        Review of Systems   Constitutional: Negative for chills and fever.   Respiratory: Positive for shortness of breath. Negative for cough, choking and chest tightness.    Cardiovascular: Negative for palpitations and leg swelling.   Gastrointestinal: Negative for abdominal pain.   Genitourinary: Negative for difficulty urinating.        History     Reviewed By Date/Time Sections Reviewed    Beto Salinas,  5/26/2020  1:34 PM Medical, Surgical, Tobacco, Family, Socioeconomic    SimonLuis Manuel gunn Jr., [facility] 5/26/2020  1:07 PM Tobacco           Objective:     Vitals:    05/26/20 1306   BP: 99/70   Pulse: 68   Weight: 185 lb 6.4 oz (84.1 kg)   Height: 6' (1.829 m)     Physical Exam  No physical examination.  Do the COVID outbreak this was handled telephonically  This note has been dictated using voice recognition software. Any grammatical or context distortions are unintentional and inherent to the software    Total time: 7 min 32 sec

## 2021-06-08 NOTE — TELEPHONE ENCOUNTER
Patient Returning Call  Reason for call:  Appointment   Information relayed to patient:  Yes, scheduled phone visit for 5/15/20 at 10:00 with Dr Bolanos   Patient has additional questions:  No  If YES, what are your questions/concerns:    Okay to leave a detailed message?: No call back needed

## 2021-06-08 NOTE — PATIENT INSTRUCTIONS - HE
Pt needs to be re-assessed due to worsening shortness of breath.  Need O2 sats, repeat chest xray, ? Re-admit or change treatment (pt may refuse hospitalization but is will to be re-evaluated)

## 2021-06-08 NOTE — PROGRESS NOTES
"Campos Ocampo is a 86 y.o. male who is being evaluated via a billable telephone visit.      The patient has been notified of following:     \"This telephone visit will be conducted via a call between you and your physician/provider. We have found that certain health care needs can be provided without the need for a physical exam.  This service lets us provide the care you need with a short phone conversation.  If a prescription is necessary we can send it directly to your pharmacy.  If lab work is needed we can place an order for that and you can then stop by our lab to have the test done at a later time.    Telephone visits are billed at different rates depending on your insurance coverage. During this emergency period, for some insurers they may be billed the same as an in-person visit.  Please reach out to your insurance provider with any questions.    If during the course of the call the physician/provider feels a telephone visit is not appropriate, you will not be charged for this service.\"    Patient has given verbal consent to a Telephone visit? Yes    What phone number would you like to be contacted at? [phone]    Patient would like to receive their AVS by AVS Preference: Ruddy.    Additional provider notes:     Assessment/Plan:    Congestive heart failure, unspecified HF chronicity, unspecified heart failure type (H)  Abdominal distention, bloating, breathlessness.  Differential discussed on her telephone call with COPD exacerbation versus CHF exacerbation versus other.  He has not responded to our attempts to diurese him through an increase of Lasix.  The patient describes progressively worsening symptoms.  Unfortunately, I think he may need IV diuresis and the benefits of a physical/laboratory examination.  Referred him to the emergency department for evaluation.  The patient agreed to go.     Return in about 1 week (around 5/18/2020) for recheck if not improving.    Linden Calloway, " "MD  _______________________________    Chief Complaint   Patient presents with     Shortness of Breath     x 1 week ( pt states he is gaining 1lb per week)      Diarrhea     Anorexia     Chills     Bloated     Subjective: Campos Ocampo is a 86 y.o. year old male who I have not seen in clinic before who presents with the following acute complaint(s):    swelling:   - breathless.  History of COPD.     - last week, he had episodes of loose stools   - appetiet has been poor   - weight gain (~ 2 weeks ago was 180.  Now 186).   - abdomen is distending.   - \"Weight is abdomen.\"   - anorexia.   - he has never had an episodes like this before.   -pneumonia in January (new diagnosis with COPD and emphysema).   - PND: usually sleep is good.  For the past couple of months he struggle to breath.   - no pulse ox at home.   - BP: yesterday 111/7x.  HR was 75 (higher than normal)   - extra lasix: no change in urination.   - lives with wife.     ROS: Complete review of systems obtained.  Pertinent items are listed above.     The following portions of the patient's history were reviewed and updated as appropriate: allergies, current medications, past medical history and problem list.     Objective:   There were no vitals taken for this visit.  No face-to-face exam was performed as part of today's visit.  The patient had a normal affect on the phone.  Speech was coherent.  There was no shortness of breath.  No tangentiality.  No acute distress.      No results found for this or any previous visit (from the past 24 hour(s)).  No results found.    Additional History from Old Records Summarized (2): no  Decision to Obtain Records (1): no  Radiology Tests Summarized or Ordered (1): no  Labs Reviewed or Ordered (1): no  Medicine Test Summarized or Ordered (1): no  Independent Review of EKG or X-RAY(2 each): no    This note has been dictated using voice recognition software. Any grammatical or context distortions are unintentional and " inherent to the software    Phone: [phone]  Start: 10:24 AM  End: 10:36 AM  Phone call duration:  12 minutes    Linden Calloway MD

## 2021-06-09 NOTE — TELEPHONE ENCOUNTER
Dr. Davis noted that patient needs to be seen in clinic as soon as possible by HF [facility] but we are not in clinic.  Are there any [facility] appointments today or tomorrow?

## 2021-06-09 NOTE — TELEPHONE ENCOUNTER
Wellness Screening Tool  Symptom Screening:  Do you have one of the following NEW symptoms:    Fever (subjective or >100.0)?  No    A new cough?  No    Shortness of breath?  Yes , however this is more indicative of his current CHF illness, not new    Chills? No     New loss of taste or smell? No     Generalized body aches? No     New persistent headache? No     New sore throat? No     Nausea, vomiting, or diarrhea?  No    Within the past 3 weeks, have you been exposed to someone with a known positive illness below:    COVID-19 (known or suspected)?  No    Chicken pox?  No    Mealses?  No    Pertussis?  No    Patient notified of visitor policy- They may have one person accompany them to their appointment, but they will need to wear a mask and will be screened upon arrival for symptoms: Yes  Pt informed to wear a mask: Yes  Pt notified if they develop any symptoms listed above, prior to their appointment, they are to call the clinic directly at [phone] for further instructions.  Yes  Patient's appointment status: Pt will plan to be seen in clinic on Thursday July 9th as currently scheduled.  Mervat Garcia RN, BSN

## 2021-06-09 NOTE — TELEPHONE ENCOUNTER
Campos was notified of plan for direct admission, bed control contacted and report from Dr. Davis to Hospitalist given.   Campos will plan to arrive later after his wife returns from doing errands. This may be after 5pm tonite.   Report called to the floor nursing staff.   sk/RN

## 2021-06-09 NOTE — PROGRESS NOTES
Hospital Follow-up Visit:    Assessment/Plan:     Problem List Items Addressed This Visit     NYHA class 3 heart failure with preserved ejection fraction (H)     Hospital follow-up visit.  This patient today expresses frustration at the lack of improvement that he realized while he was hospitalized late last week.  He says that he feels basically the same today as he did prior to going into the hospital.  He refutes his diagnosis of heart failure and is frustrated because he believes that his treatment team has not explained was going on.  He and I reviewed the notes from 2020 in which the hospital team is said that he has class III heart failure with preserved ejection fraction.  He also has COPD and is on oxygen at night as well as unexplained anemia.  We discussed that there is multiple factors in his health history that may resulted for his poor stamina.  Lastly, we reviewed that he had pneumonia in the month of January.  After reviewing his symptoms, the patient believes that his decrease in stamina actually started back when he had pneumonia.  - The patient has an understanding of his medications.  He is taking them as prescribed.  -His weights have been stable since discharge.  He is measuring his weight daily.  -I recommended the patient follow-up with his cardiologist and ask whether or not his heart failure is accounting for his decreased stamina as suggested by his NYHA class III heart failure classification.  - COPD?  The patient is tolerant and adherent to the recommendation to use Advair.  At one point he was prescribed to tropia which is no longer in his chart.  He has not tried albuterol for quite some time.  I suggested he try albuterol to see if it helps with his breathing.  -Lastly, we reviewed the need for a recheck of his hemoglobin.  When he was hospitalized, his hemoglobin is actually stable.           Other Visit Diagnoses     Pre-op exam    -  Primary        Greater than 45 minutes of total  "time was spent with patient of which greater than 50% was spent on counseling and coordination of care.     Subjective:     Campos Ocampo is a 86 y.o. male who presents for a hospital discharge follow up.    Hospital/Nursing Home/IP Rehab Facility: [facility]  Date of Admission: 06/25/2020  Date of Discharge:06/27/2020  Reason(s) for Admission:acute heart failure            Do you have any problems taking your medication regularly?  None       Have you had any changes in your medication since discharge? None       Have you had any difficulty following your discharge or treatment plan?  Yes    Narrative history:    - Continues to struggle with breathing despite diuresis.  Frustrated that he has to stop and catch his breath in order to continue to talking.  Known COPD/emphysema.  The patient says that his cardiologists have \"said that my heart is fine.\"    - No improvement despite fluid being removed.    - he uses O2 at night.  Sleep was and is great.    - He does not know why he gets ascities.   - He laments that he cannot take long walks like he could a few months ago.    - He continues to need to urinate.  4x/last night   - Nutrition: \"No problem.\"    Summary of hospitalization:  Hospital discharge summary reviewed  Diagnostic Tests/Treatments reviewed.  Follow up needed: Continue diuretic medications.  Continue to weigh himself daily.  Other Healthcare Providers Involved in Patient's Care: Patient Care Team:  Sandro Parker MD as PCP - General (Family Medicine)  Heaven Mcdaniel MD as Physician (Hematology and Oncology)  Sandro Parker MD as Assigned PCP  Winsome Davis MD as Physician (Cardiology)  Nya Plasencia, PharmD as Pharmacist (Pharmacist)    Update since discharge: no change   Information from family, SNF, care coordination:  reviwed    Post Discharge Medication Reconciliation: discharge medications reconciled, continue medications without " change  Plan of care communicated with: patient    Objective:     Vitals:    06/29/20 1319   BP: 110/65   Pulse: 65   Resp: 12   Temp: (!) 95.4  F (35.2  C)   TempSrc: Oral   SpO2: 97%   Weight: 175 lb (79.4 kg)     Physical Exam:  Gen: No acute distress, pleasant.  Cardiac: Regular rate and rhythm, normal S1/S2, no murmurs of the gallops  Respiratory: Clear to auscultation bilaterally.  Psych: Normal affect  Abdomen: Soft, distended.  Nontender.    During this encounter, I reviewed multiple notes from the year 2020.  The patient had pneumonia was hospitalized back in January the discharge in January 22.  He has not seen a pulmonologist since 2019.  At that time, there was discussion of COPD.  The patient is on oxygen.  They confirmed his lack of tobacco exposure history.  His most recent echocardiogram on 5/25 did show preserved ejection fraction.      Coding guidelines for this visit:  Type of Medical   Decision Making Face-to-Face Visit       within 7 Days of discharge Face-to-Face Visit        within 14 days of discharge   Moderate Complexity [id number]   High Complexity [id number]       Electronically signed by Linden Calloway MD 06/29/20 1:22 PM

## 2021-06-09 NOTE — PROGRESS NOTES
Pre-Procedure Negative COVID Test     Campos Ocampo  COVID-19 PCR Results    COVID-19 PCR Results 5/11/20 5/11/20 5/15/20 5/15/20 7/13/20    1158 1158 1236 1236    2019-nCOV Test received-See reflex to IDDL test SARS CoV2 (COVID-19) Virus RT-PCR  Test received-See reflex to IDDL test SARS CoV2 (COVID-19) Virus RT-PCR  Not Detected   SARS-CoV-2 PCR Result  NEGATIVE  NEGATIVE       Comments are available for some flowsheets but are not being displayed.             Patient Notification  Patient notified of the negative COVID test result    Patient Information  Patient informed of the no visitor policy  Patient instructed to continue to self-quarantine prior to procedure  Patient informed to contact the ordering provider if the following symptoms develop prior to procedure:   Fever  Cough  Shortness of Breath  Sore throat   Runny or stuffy nose  Muscle or body aches  Headaches  Fatigue  Vomiting or diarrhea   Rash    Tasha Jimenez

## 2021-06-09 NOTE — PATIENT INSTRUCTIONS - HE
Campos Ocampo,    It was a pleasure to see you today at [facility].     My recommendations after this visit include:  - Please follow up with Dr Davis in 1 month   - Please follow up with Jennifer Nye or Stephanie Roberts in 1-2 weeks  - Please schedule Ultrasound with paracentesis  - Continue current medications  - I have made a referral to palliative care. They will call you to set up an appointment    Stephanie Roberts, CNP

## 2021-06-09 NOTE — PROGRESS NOTES
Hospital discharge follow up call to pt, no answer.  Left VM message reminding pt of appt today. Also left message that RN will call back to offer enrollment in [employer] Tracker program for CHF tele-monitoring. RN unable to make second follow up call before today's appt.

## 2021-06-09 NOTE — TELEPHONE ENCOUNTER
Contacted Campos today to discuss his concerns with increased shortness of breath. He reports progressively more difficulty with talking, and walking any distances.  He noted that the most of his fluid retention appears to be in his belly. He has been wearing support stockings to help out with the lower extremity edema, on in the AM and off at night, which has helped this.   He uses oxygen at night and he has been up multiple times a night due to his breathing. He is using only one pillow.     His weight today is 175.8# up from 172# last week.   Currently he is using Bumex 1mg two times a day, and   Spironolactone 25mg daily.     He reports that he required in patient diuresis in the past, to remove 6 liters of fluid from his abdomen.  He reportedly watches his salt intake. He is encouraged to monitor this very closely and to aim for less then 1500mg daily sodium intake, and to read labels on everything to achieve this. His wife was advised of the same.    Per Dr. Davis patient was to consult with Jennifer Nye CNP and seek f/u with [facility] in clinic.   Jennifer what are your recommendations at this time?  sk/RN

## 2021-06-09 NOTE — TELEPHONE ENCOUNTER
JACKELIN Gasca today to discuss his concerns and advised that he should return my call to offer additional information sk./RN

## 2021-06-09 NOTE — PROGRESS NOTES
Clinic Care Coordination Contact  [facility]/Voicemail       Clinical Data: Care Coordinator Outreach  Outreach attempted x 1.  Left message on patient's voicemail with call back information and requested return call.  Plan: Care Coordinator will try to reach patient again in 1-2 business days.    The CHW was able to call the patient's son and he expressed that he talked to the patient's wife and his mom, and they were stating that they wanted to talk to the SW to help coordinate care. The patient was informed that he is not able to hang up the phone and to be open to talking with the CHW.     Next Outreach: 7/22/20

## 2021-06-09 NOTE — TELEPHONE ENCOUNTER
Who is calling:  Collins Hamlin (CTC on file), & DIL   Reason for Call:  Son & DIL were calling regarding patient need for help.  They state patient's wife is in need of help and the family feels like patient could benefit from a care coordinator.   The patient and wife a feeling frustrated and having a hard time understanding things that are going on.   Writer did let son know that patient previously declined [facility] services, see 06/02/20 patient outreach encounter.    Family is now requesting a new order/referral to [facility] for patient.    Date of last appointment with primary care: 06/29/20  Okay to leave a detailed message: Yes

## 2021-06-09 NOTE — TELEPHONE ENCOUNTER
Campos was contacted to advise him of the appt already set up on Monday 7/6/2020 w/ jeannine Carter/RN

## 2021-06-09 NOTE — PATIENT INSTRUCTIONS - HE
Campos Ocampo,    It was a pleasure to see you today at [facility].     My recommendations after this visit include:  - Please follow up with Dr Davis August 6    - Please follow up with Stephanie Roberts in 4 weeks  - Continue current medications  - I have ordered a paracentesis and they will call you to schedule    Stephanei Roberts, CNP

## 2021-06-09 NOTE — PROGRESS NOTES
Review of Systems - History obtained from the patient  General ROS: negative  Psychological ROS: negative  Ophthalmic ROS: negative  ENT ROS: negative  Allergy and Immunology ROS: negative  Hematological and Lymphatic ROS: negative  Endocrine ROS: negative  Respiratory ROS: positive for - shortness of breath  Cardiovascular ROS: positive for - dyspnea on exertion  Gastrointestinal ROS: positive for - diarrhea  Genito-Urinary ROS: positive for - nocturia  Musculoskeletal ROS: negative  Neurological ROS: negative  Dermatological ROS: negative   Per patient has a tight abdomen gaining wt again

## 2021-06-10 NOTE — PROGRESS NOTES
"Campos Ocampo is a 86 y.o. male who is being evaluated via a billable telephone visit.      The patient has been notified of following:     \"This telephone visit will be conducted via a call between you and your physician/provider. We have found that certain health care needs can be provided without the need for a physical exam.  This service lets us provide the care you need with a short phone conversation.  If a prescription is necessary we can send it directly to your pharmacy.  If lab work is needed we can place an order for that and you can then stop by our lab to have the test done at a later time.    Telephone visits are billed at different rates depending on your insurance coverage. During this emergency period, for some insurers they may be billed the same as an in-person visit.  Please reach out to your insurance provider with any questions.    If during the course of the call the physician/provider feels a telephone visit is not appropriate, you will not be charged for this service.\"    Patient has given verbal consent to a Telephone visit? Yes    What phone number would you like to be contacted at? [phone]    Patient would like to receive their AVS by AVS Preference: Mail a copy.    Additional provider notes: phone note conducted with wife and patient    SUBJECTIVE:   Chief Complaint   Patient presents with     Follow-up     Discuss Paracentesis, discuss recent labs results      Hypotension     Spironolactone removed per cardiology, still having low BP, weakness, ongoing shortness of breath     Campos Ocampo 87 y.o. male    Current Outpatient Medications   Medication Sig Dispense Refill     albuterol (PROAIR HFA;PROVENTIL HFA;VENTOLIN HFA) 90 mcg/actuation inhaler Inhale 2 puffs every 4 (four) hours as needed for wheezing or shortness of breath. Use with spacer 1 Inhaler 11     bumetanide (BUMEX) 1 MG tablet Take 1 tablet (1 mg total) by mouth 2 (two) times a day. 60 tablet 11     " cholecalciferol, vitamin D3, 1,000 unit tablet Take 1,000 Units by mouth daily.       fluticasone propion-salmeterol (ADVAIR DISKUS) 250-50 mcg/dose DISKUS Inhale 1 puff 2 (two) times a day. 3 each 3     levothyroxine (SYNTHROID, LEVOTHROID) 175 MCG tablet TAKE 1 TABLET BY MOUTH EVERY MORNING AT 6 AM 90 tablet 0     magnesium oxide (MAGOX) 400 mg (241.3 mg magnesium) tablet Take 1 tablet (400 mg total) by mouth 2 (two) times a day. (Patient taking differently: Take 400 mg by mouth daily. ) 200 tablet 1     memantine (NAMENDA) 5 MG tablet Take 5 mg by mouth at bedtime.   1     multivitamin with minerals (THERA-M) 9 mg iron-400 mcg Tab tablet Take 1 tablet by mouth daily.       sotaloL (BETAPACE) 80 MG tablet Take 1 tablet (80 mg total) by mouth 2 (two) times a day. 180 tablet 3     XARELTO 20 mg tablet TAKE 1 TABLET DAILY WITH SUPPER 90 tablet 1     metFORMIN (GLUCOPHAGE) 500 MG tablet Take 1 tablet (500 mg) by mouth 2 times a day with meals. 180 tablet 0     No current facility-administered medications for this visit.      Allergies: Penicillins   No LMP for male patient.    HPI:   Patient is interviewed with a phone visit today his wife is present on the phone  They want to discuss why we are discussing referral to GI specialty  We discussed with his ongoing need of repeated paracentesis and becoming more frequently I think he should establish with the GI specialty to have more appropriate availability for required paracentesis as they are becoming more regular  Patient and wife feel that he should had a paracentesis earlier this week as his symptoms have worsened  He is experiencing increasing ascites and abdominal distention and is noting some increasing weakness and shortness of breath as his abdomen distends  Discussed with him being seen at the ER if he is worsening over the weekend and not able to make it till Monday  The understand now the reason for referral to GI specialty  Wife states they are meeting  with hospice for evaluation on Tuesday next week-with patient's increasing renal insufficiency repeated requiring paracentesis with increasing ascites on a more frequent basis I think that meeting with hospice is a good idea to assure quality care in the upcoming months.  Patient will contact me with any further concerns but understanding now of GI referral.    ROS: negative except as per HPI    OBJECTIVE:   The patient appears well over the phone-  in no distress.  There were no vitals taken for this visit.    Assessment/Plan:  1. Muscle weakness (generalized)  Patient with ongoing weakness and shortness of breath  Patient continues to accumulate abdominal ascites and requiring paracentesis on a regular basis  He has a upcoming appointment with hospice for further discussion next week  Give a planned paracentesis on Monday discussed with him if symptoms are worsening over the weekend to go to the ER  Discussed with them a more regular planned appointment with GI to establish the availability of paracentesis when needed  Plan mainly communicated with his wife was on the phone patient in the background-they are in agreement to follow-up with paracentesis on Monday and hospice on Tuesday    2. Other ascites  Please see #1 above        Phone call duration:  9 minutes    Jacqui Mcdaniels, CMA

## 2021-06-10 NOTE — TELEPHONE ENCOUNTER
[facility] scheduled patient with Collins Garcia CNP for Thursday 09/03 @ 12:45 pm as a telehealth appt. They dont have any sooner appt and face to face appts are limited, booking into end of September.     Called and spoke to Suzie, she is OK with 09/03 appt. She states if they need to reschedule, she will call us. No questions at this time.

## 2021-06-10 NOTE — TELEPHONE ENCOUNTER
Campos was LM today on identified vm in response to his My Chart message to Dr. Davis. He is reporting low b/p readings and therefore, we need to discuss his hydration, and seek f/u appt with him in clinic to assess further management of his diuretics.sk/RN

## 2021-06-10 NOTE — TELEPHONE ENCOUNTER
RN cannot approve Refill Request    RN can NOT refill this medication Protocol failed and NO refill given. Last office visit: 2/12/2020 Sandro Parker MD Last Physical: Visit date not found Last MTM visit: Visit date not found Last visit same specialty: Visit date not found.  Next visit within 3 mo: Visit date not found  Next physical within 3 mo: Visit date not found      Elaine Collazo, [facility] Triage/Med Refill 8/17/2020    Requested Prescriptions   Pending Prescriptions Disp Refills     metFORMIN (GLUCOPHAGE) 500 MG tablet [Pharmacy Med Name: metFORMIN HCl Oral Tablet 500 MG] 180 tablet 3     Sig: Take 1 tablet (500 mg) by mouth 2 times a day with meals.       Metformin Refill Protocol Failed - 8/16/2020  8:46 PM        Failed - GFR or Serum Creatinine in last 6 months     GFR MDRD Non Af Amer   Date Value Ref Range Status   08/06/2020 32 (L) >60 mL/min/1.73m2 Final     GFR MDRD Af Amer   Date Value Ref Range Status   08/06/2020 39 (L) >60 mL/min/1.73m2 Final             Failed - Microalbumin in last year      No results found for: MICROALBUR               Passed - Blood pressure in last 12 months     BP Readings from Last 1 Encounters:   08/06/20 92/58             Passed - LFT or AST or ALT in last 12 months     Albumin   Date Value Ref Range Status   08/06/2020 2.1 (L) 3.5 - 5.0 g/dL Final     Bilirubin, Total   Date Value Ref Range Status   08/06/2020 0.7 0.0 - 1.0 mg/dL Final     Bilirubin, Direct   Date Value Ref Range Status   05/26/2020 0.3 <=0.5 mg/dL Final     Alkaline Phosphatase   Date Value Ref Range Status   08/06/2020 158 (H) 45 - 120 U/L Final     AST   Date Value Ref Range Status   08/06/2020 34 0 - 40 U/L Final     ALT   Date Value Ref Range Status   08/06/2020 31 0 - 45 U/L Final     Protein, Total   Date Value Ref Range Status   08/06/2020 7.3 6.0 - 8.0 g/dL Final                Passed - Visit with PCP or prescribing provider visit in last 6 months or next 3 months     Last  office visit with prescriber/PCP: Visit date not found OR same dept: Visit date not found OR same specialty: Visit date not found Last physical: Visit date not found Last MTM visit: Visit date not found         Next appt within 3 mo: Visit date not found  Next physical within 3 mo: Visit date not found  Prescriber OR PCP: Sandro Parker MD  Last diagnosis associated with med order: 1. Type 2 diabetes mellitus without complication, without long-term current use of insulin (H)  - metFORMIN (GLUCOPHAGE) 500 MG tablet [Pharmacy Med Name: metFORMIN HCl Oral Tablet 500 MG]; Take 1 tablet (500 mg) by mouth 2 times a day with meals.  Dispense: 180 tablet; Refill: 0     If protocol passes may refill for 12 months if within 3 months of last provider visit (or a total of 15 months).           Passed - A1C in last 6 months     Hemoglobin A1c   Date Value Ref Range Status   06/26/2020 5.6 <=5.6 % Final     Comment:     Prediabetes:   HBA1c       5.7 to 6.4%        Diabetes:        HBA1c        >=6.5%   Patients with Hgb F >5%, total bilirubin >10.0 mg/dL, abnormal red cell turnover, severe renal or hepatic disease or malignancy should not have this A1C method used to diagnose or monitor diabetes.

## 2021-06-10 NOTE — PROGRESS NOTES
Pre-Procedure Negative COVID Test     Campos Ocampo  COVID-19 PCR Results    COVID-19 PCR Results 5/11/20 5/11/20 5/15/20 5/15/20 7/13/20 7/25/20 7/25/20 8/14/20    1158 1158 1236 1236  1350 1350    2019-nCOV Test received-See reflex to IDDL test SARS CoV2 (COVID-19) Virus RT-PCR  Test received-See reflex to IDDL test SARS CoV2 (COVID-19) Virus RT-PCR  Not Detected Test received-See reflex to IDDL test SARS CoV2 (COVID-19) Virus RT-PCR  Not Detected   SARS-CoV-2 PCR Result  NEGATIVE  NEGATIVE   NEGATIVE       Comments are available for some flowsheets but are not being displayed.             Patient Notification  Patient notified of the negative COVID test result    Patient Information  Patient informed of the no visitor policy  Patient instructed to continue to self-quarantine prior to procedure  Patient informed to contact the ordering provider if the following symptoms develop prior to procedure:   Fever  Cough  Shortness of Breath  Sore throat   Runny or stuffy nose  Muscle or body aches  Headaches  Fatigue  Vomiting or diarrhea   Rash    Tasha Jimenez

## 2021-06-10 NOTE — TELEPHONE ENCOUNTER
----- Message from Sandro Parker MD sent at 8/14/2020  9:37 AM CDT -----  Meet  Could you help him facilitate GI consult for as soon as possible- he needs to establish with one of their ascites specialists- he is requiring paracentesis every couple of weeks.  Thanks  Dr. COLLINS

## 2021-06-10 NOTE — PATIENT INSTRUCTIONS - HE
Thank you for allowing the [facility] to be a part of your care. Please pay close attention to the following medications as they have been changed during this visit.     1.) Please stop taking spironolactone (aldactone).

## 2021-06-10 NOTE — PATIENT INSTRUCTIONS - HE
Campos Ocampo,    It was a pleasure to see you today at [facility].     My recommendations after this visit include:  - Please follow up with Dr Davis September 10  - Please follow up with Stephanie Roberts in 6 weeks  - Continue current medications    Stephanie Roberts, CNP

## 2021-06-10 NOTE — TELEPHONE ENCOUNTER
Suzie notified per message from 8/14/2020 that he needs to establish with a GI doctor for ascites because he is requiring paracentesis every couple of weeks.   Notified that paracentesis is a radiology procedure and needs to be done at the hospital.  She voiced understanding.

## 2021-06-10 NOTE — TELEPHONE ENCOUNTER
Tried reaching out to pt or spouse but no answer. I left a detailed message for them to call me back and I can help them schedule in a 3-way call with [facility].

## 2021-06-10 NOTE — TELEPHONE ENCOUNTER
Campos 's wife phoned to let us know that she was able to secure an appt with Dr. Davis on this Thursday to review his status. She reports that he has not been drinking well. I encouraged her to be sure he has 50 oz per day to stay hydrated. To use sugar free popcicles or other options to get some fluid in. Avoid Gatorade or Propel as they are high in sodium.   She will be watchful of this, as he has enjoyed Gatorade in the past.  He is not tachycardic that she can assess/identify. He denies fast HR or palpitations. Slightly lightheaded but not dizzy or faint feeling. Eating in small amounts frequently.   His b/p are continued low 80's systolic today. She reports that he is also retaining abdominal fluid again, which is concerning to them.    FYI to Dr. Ryan paez/RN

## 2021-06-10 NOTE — PROGRESS NOTES
Clinic Care Coordination Contact  Care Team Conversations     SW contacted patient's son today and explained that we attempted to enroll his father in June and he declined. Attempted again in July and he declined.    Patient's son reported that his father is a liar and will say he has this so he doesn't talk. JENNIFER informed him that his father said there is a care coordinator who contacts him every 4 weeks. His son reported this isn't true.    Patient's son reported his dad needs help coordinating care and that they keep going to the hospital. SW reviewed the chart and patient was last hospitalized in June. Son reported his father has been hospitalized at least five times since then. SW explained the chart indicates no hospital visits and that his father has been communicating by phone and mychart with his providers. Son insisted his father has gone to the hospital and reported it was at [facility]. SW again explained what we can see.    Son reported he is concerned that his mother is needing to take care of his father. SW explained if his mother is a patient, we could reach out to her and see if there is something [facility] can offer to support her.     JENNIFER placed order for patient's wife and reached out to her.     After talking with patient's wife. She indicated there may be a care coordinator involved already. She didn't know who, but she reported there are people who call from the clinic and also insurance company. It is becoming confusing with multiple people calling and offering support.     She reported Campos is still taking care of himself and doesn't need help yet.     She said it would be really helpful if they could get the appointments to drain his fluid regularly scheduled instead of scheduling individually.     JENNIFER is going to route this to PCP and Nurse at Cardiology to inquire about who may be coordinating care and if they can get standing appointments for fluid draining.     JENNIFER is not enrolling Campos  because he still declined the service, but will enroll wife to provide and follow up on resources for potential future in home services.

## 2021-06-10 NOTE — TELEPHONE ENCOUNTER
----- Message from Stephanie Roberts CNP sent at 8/20/2020  1:59 PM CDT -----  Regarding: paracentesis  Mervat,    So I just had a phone visit with Campos and his wife. Informed him I placed standing order for every 2 weeks for paracentesis. They are wondering will this just be scheduled every 2 weeks or will they need to call you to get it set up? Will you please call them and let them know the process. Thank you.    Stephanie

## 2021-06-10 NOTE — TELEPHONE ENCOUNTER
----- Message from Winsome Davis MD sent at 8/7/2020  4:01 PM CDT -----  Quinn Crowell;  Can you contact Mr. Ocampo and update him regarding the test results? Please let him no that the results do not indicate significant liver congestion, but we will have to continue to monitor. Let him know that overall that his kidney function is down, but this is like due to the increased abdominal pressure from the ascites. Let's recheck a CMP after his upcome paracentesis. On another note pertaining to him, can you help to coordinate a [facility] visit for him. There have been many oast attempts, but he hasn't been seen by them to date.    Thanks;  ~ Winsome

## 2021-06-10 NOTE — PROGRESS NOTES
"Palliative Care Outpatient Clinic     (This note was transcribed using voice recognition software. While I review and edit the transcription, I may miss errors, and the software sometimes does unexpected capitalizations and formatting that I miss. Please let me know of any serious mistranscriptions and I will addend this note.)   History of Present Illness: Mr. Campos Ocampo is a 86 y.o. male with a PMHx significant for HTN, chronic A. fib anticoagulated on Xarelto, HLD, hypothyroidism, DMT2, COPD, and HFpEF. Follows with Dr. Davis (cardiology). LV was on 8/6 for which report of recurrent paracentesis was noted.     Comes to clinic today in person with his wife Beth at his side. He walks independently. First time meeting them today.      Campos enjoys walking and riding bike at baseline, but hasn't been able to do it for very long due to fatigue and shortness of breath. States he just had 7 liters removed from abdominal ascites, last time was 3 weeks prior, hopeful to be getting this done q 2 weeks. Also was contacted by [facility] regarding a scale that he wasn't aware of? Max of 20 feet for walking due to shortness of breath. Has been feeling this way for past 6 months. PNA in January 2020. Lost 70 lbs in past 7 months.  Very frustrated with feeling so poorly.  States he was told by Dr. Davis his cardiologist that there was \"nothing more she could do for him.\"  States he was told by 2 different nurses that he is dying.  SH: lives with his wife Suzie at [facility]. 4 sons.   ROS: 10 point review of systems is negative except for his fatigue and shortness of breath as above.  PE: There were no vitals filed for this visit.   Wt Readings from Last 3 Encounters:   08/06/20 162 lb (73.5 kg)   07/09/20 178 lb 3.2 oz (80.8 kg)   06/29/20 175 lb (79.4 kg)      Data reviewed:   I reviewed recent labs and imaging, my comments:   Echo 5/12/20:    Left Ventricle: Left ventricle not well visualized. Normal left " "ventricular systolic function.The estimated left ventricular ejection fraction is 60%. This represents a normal ejection fraction. Unable to assess diastolic function given technical limitations and lack of apical views.    The left ventricular wall motion is normal.    Right Ventricle: The right ventricle is mildly dilated. Right ventricle wall thickness is normal.    IVC: Normal central venous pressure. Estimated central venous pressure equal to 3 mmHg.    No previous study for comparison.    Lab Results   Component Value Date    CREATININE 2.00 (H) 08/06/2020     Lab Results   Component Value Date    ALBUMIN 2.1 (L) 08/06/2020       Impression & Recommendations:   Advance Care Planning/Palliative Care Encounter:   Unfortunately Campos has end-stage diastolic heart failure.  He has been having his abdominal ascites drained on average 6 to 7 L every other week.  He does verbalize understanding from the cardiology team that there is \"nothing more that they can do.\"  Empathized in the difficulty of the situation and wanting him to feel as best as he can despite his terminal diagnosis.  Did talk jointly with Eladia Fuentes  about potential other options for supportive care for him going forward.  Talked about home care as potential option with therapies to try to potentially optimize his strength somewhat as well as potential option of hospice to be a part of his care.  Educated about what both of those services would look like.  Also talked about the option of having a Pleurx drain placed so that he could eliminate having to go out and have another appointment or wait for it to be drained.  He states that he wants to think about these different options and get back to the writer about how he would like to proceed.    Fatigue:  Talked about that there may need to be other ways of getting out and about and encouraged him to continue to work on pushing himself daily to do little bits of activity or even to get " outside with a fresh air.  Eladia Fuentes did talk about a potential wheelchair option from [employer] that Beth could push him in.    Abdominal ascites secondary to CHF:   We will reach out to cardiology to have them follow-up with him regarding ensuring that he does get his ascites drained at least every 2 weeks.  Per chart review it looks like Dr. Davis had placed an order for this to be a standing order.  Also will have them follow-up with him regarding the [employer] scale that was ordered.    Dyspnea: ongoing despite drainage of ascites fluid. Could consider low dose opioid however given patient's frailty and age would like to ensure that he has either home care vs hospice in home before initiating.   -Could also consider home O2 prn as well.   -Recommend WC for mobility at times vs walker. Just got a walker last week    Chart data reviewed today:   Advance care planning: He does have a healthcare directive.  DNR/DNI.  His wife Beth is his primary healthcare agent.  Family History   Problem Relation Age of Onset     Stroke Mother         cerebral hemorrage     Emphysema Father       Past Medical History:   Diagnosis Date     Atrial fibrillation (H)      CHF (congestive heart failure) (H)      Diabetes mellitus (H)      VILLEGAS (dyspnea on exertion) 12/21/2018     Hyperlipidemia LDL goal <100 9/6/2018     Hypertension      Kidney stones      Ptosis, left eyelid      Severe chronic obstructive pulmonary disease (H) 11/1/2018     Shingles      Subdural hematoma (H)     traumatic after a fall      Past Surgical History:   Procedure Laterality Date     CARDIOVERSION  02/11/2019    nsr x 1 min, then afib     CHOLECYSTECTOMY       eyelid surgery Left 04/2018     KIDNEY STONE SURGERY  2018     US PARACENTESIS  5/27/2020     US PARACENTESIS  7/16/2020     US PARACENTESIS  7/27/2020      Allergies   Allergen Reactions     Penicillins Unknown     Tolerated CTX Jan 2020     Medications: I have reviewed the patient's medication  profile.   Thank you for involving us in the patient's care.   Total face to face time 40 minutes. Total time spent on charting and meeting with patient was 60 minutes. Greater than 50% was spent in counseling with the patient on discussions of understanding of illness, goals of care, and review of their pertinent symptoms.     Elaina Perales NP-C, [facility] / Palliative Medicine / Text me via [facility] - search for 'Diaz'- then click the pager icon  or call Flo Helton (Provider ) Monday-Friday [phone].

## 2021-06-10 NOTE — TELEPHONE ENCOUNTER
Contacted scheduling at [facility] to assist in arrangement of next ultrasound guided paracentesis for his ascites. His last procedure was on 8/17/20 they did not have 8/31/20 available so therefore will plan to do this a little bit sooner rather than wait until the later date. She was advised that this is a recurrent procedure to be done every two weeks. They should plan to schedule the next one while they are there for this one. Radiology scheduling will reach out to them to arrange this for 8/26/2020 and the COVID-19 screening will be then triggered by this scheduling of the procedure. They will be contacted to be directed of when /where to go for this screening appointment in advance of the paracentesis. Sk/RN    FYI to Stephanie Roberts, CNP

## 2021-06-10 NOTE — PROGRESS NOTES
Follow-up CBC shows continued improvement in hemoglobin.  Patient may have mild anemia related to renal dysfunction or very early myelodysplastic syndrome.  At this point will continue monitoring his CBC through his primary physician.  We can see back if hemoglobin drops below.

## 2021-06-10 NOTE — TELEPHONE ENCOUNTER
Wellness Screening Tool  Symptom Screening:  Do you have one of the following NEW symptoms:    Fever (subjective or >100.0)?  No    A new cough?  No    Shortness of breath?  No     Chills? No     New loss of taste or smell? No     Generalized body aches? No     New persistent headache? No     New sore throat? No     Nausea, vomiting, or diarrhea?  No    Within the past 3 weeks, have you been exposed to someone with a known positive illness below:    COVID-19 (known or suspected)?  No    Chicken pox?  No    Mealses?  No    Pertussis?  No    Patient notified of visitor policy- They may have one person accompany them to their appointment, but they will need to wear a mask and will be screened upon arrival for symptoms: Yes  Pt informed to wear a mask: Yes  Pt notified if they develop any symptoms listed above, prior to their appointment, they are to call the clinic directly at [phone] for further instructions.  Yes  Patient's appointment status: Patient will be seen in clinic as scheduled on 8/5

## 2021-06-10 NOTE — PROGRESS NOTES
"Palliative Care met with pt and wife.  This was pt's first visit with Palliative Care.  Palliative Care was explained to pt and wife.  Pt shared in the last 6 months his condition has changed after he had pneumonia.   Pt describes chronic fatigue, shortness of breath when walking short distances.  Pt voices his unhappiness with an MD/ RNS who said there is nothing more then can do.  Pt shared he is not does not want to be \"done\".  Pt would like to get back to walking and biking.  Pt's wife voiced she has seen a continual decline, pt is sleeping more walking less.. Pt's wife expressed feeling overwhelmed with caring for pt.  They just celebrated their 65 wedding anniversary.   The team educated pt and wife on Hospice and HC/ support  options. Pt and wife to talk it over and have Palliative Care number when they make a decision.  Pt and wife live in a  independent living apt  at [facility]. They do not receive any services other then cleaning help.  They are aware of the services they can receive in their building .  Pt does a HCD on file.  They have 4 children 2 in town 2 out of town. Pt notes all of them check in with pt / wife.often.  Pt appears alert possible some cognitive deficits. Pt voiced his frustration with his current medical condition. Wanting to get better. Elaina discussed his medical condition.  The team listened and supported the pt and his wife.  Pt is frail, ambulate independably today however does have a walker for long distances.  Wife expressed needing more support to help care for pt.  Resources/ programs discussed.  Team awaits to hear back from pt.  No further visit set up at this time.    Eladia FELIX  "

## 2021-06-10 NOTE — PROGRESS NOTES
Clinic Care Coordination Contact  Care Team Conversations     SW contacted Campos to complete an assessment for CCC. Campos asked what we do, SW explained. He reported he already has a care coordinator that checks in with him about every 4 weeks. He does not want CCC service at this time, he reported it would be redundant.     SW consulted with RN regarding this due to this being the second referral to CCC and he declined the first. Patient's family reported concerns. SW attempted to reach son to inquire about concerns.  Left voicemail and waiting to hear back.

## 2021-06-10 NOTE — TELEPHONE ENCOUNTER
Discussed with Ultrasound/HE Interventional Radiology the consideration that we would like to have standing every two week orders in place for this paracentesis procedure. They are able to do this for him, and the best way to do this is for us to reach out to Ultrasound scheduling [phone] after placing the orders for the procedure to let them know that this will be an every two week procedure.   They will also need a COVID screen done every two weeks as well. ( no way around this part)    Let me know if you need any other assistance with this, I assumed you would be placing this order, if I am mistaken, please redirect me. Thank you.     Mervat

## 2021-06-10 NOTE — TELEPHONE ENCOUNTER
LM for Campos today to advise him of no evidence of liver congestion per lab results, kidney function is reduced, likely due to increased pressure from the ascites. He would be advised to have labs of CMP done on 8/19 following the 8/17 paracentesis. Orders placed. sk/RN

## 2021-06-11 NOTE — TELEPHONE ENCOUNTER
Suzie was contacted with recommendations to reach out to the clinic if continued wt gain and symptoms. sk/RN

## 2021-06-11 NOTE — TELEPHONE ENCOUNTER
----- Message from Winsome Davis MD sent at 9/10/2020  4:30 PM CDT -----  Regarding: Follow-up  Quinn Crowell;  Can you call Mr. Ocampo to see how he's doing. I increased his bumex to 2 mg two times a day during today's visit, and I wanted to follow-up on his volume status and to make sure that there were no adverse side-effects.    Thanks;  ~ Winsome

## 2021-06-11 NOTE — TELEPHONE ENCOUNTER
medlist updated re: Bumex dosing changes, Labs ordered for BMP 7-10 days as per Stephanie Roberts, CNP sk/RN

## 2021-06-11 NOTE — TELEPHONE ENCOUNTER
Suzie is contacted today to discuss Campos's status. His weight is up 2# today at 156#. They had family visiting yesterday and a lot of food so she thinks this may have had some to do with it.     Campos is using Bumex 2mg two times a day at the present time. Has lab on Thursday and his next paracentesis is 9/23.  He is eating and drinking fine. No edema in lower extremities noted. Breathing is fine. Sleeping fair, was up to the bathroom x 3 during the night. His afternoon dose is 3pm of the Bumex.     Stephanie Roberts, STACI any new recommendations at this time? sk/RN

## 2021-06-11 NOTE — PATIENT INSTRUCTIONS - HE
Thank you for allowing the [facility] clinic to be a part of your care. Please pay close attention to the following medications as they have been changed during this visit.    1.) Please increase your bumetanide (bumex) to 2 mg (two 1 mg) two times daily.    2.) Please have repeat lab work 1 week after increasing your bumex to check your electrolytes and kidney function.

## 2021-06-11 NOTE — TELEPHONE ENCOUNTER
----- Message from Stephanie Roberts CNP sent at 9/21/2020  3:20 PM CDT -----  Kidney function has decreased. Please reduce bumex to 2 mg in the morning and 1 mg in the afternoon, recheck BMP in 7-10 days. Thank you

## 2021-06-11 NOTE — PATIENT INSTRUCTIONS - HE
Pulmonary concerns: COPD    Plan:    Continue with twice daily Advair. Remember to gargle and rinse your mouth and throat after you use this inhaler to prevent thrush & voice hoarseness.     Use you albuterol inhaler as needed when you are suddenly more short of breath.    It is very important to use good technique when using inhalers. If you cannot remember the instructions provided in clinic, you can visit your pharmacist to request a demonstration, or you can review a video online. You should exercise common sense when looking for videos -- best and most informative inhaler videos come from health care organizations. [employer] has videos posted by [employer] & [facility] (hospital organization) that provide detailed demonstrations.    You should follow up in 1 year.     If you have any questions or concerns, please, call our clinic at [phone].

## 2021-06-11 NOTE — TELEPHONE ENCOUNTER
"Triage Call  Call from patient inquiring of COVID-19 PCR test results  Tested 09/19/20 at [facility]  Patient reports he is \"quesitoning the accuracy of the test results\"  Reports he is asymptomatic and has not had any symptoms     Coronavirus (COVID-19) Notification    Caller Name (Patient, parent, daughter/sone, grandparent, etc)  Campos Ocampo    Reason for call  Notify of Positive Coronavirus (COVID-19) lab results, assess symptoms,  review [facility] recommendations    Lab Result    Lab test:  2019-nCoV rRt-PCR or SARS-CoV-2 PCR    Oropharyngeal AND/OR nasopharyngeal swabs is POSITIVE for 2019-nCoV RNA/SARS-COV-2 PCR (COVID-19 virus)    RN Recommendations/Instructions per [facility] Coronavirus COVID-19 recommendations    Brief introduction script  Introduce self and then review script:  \"I am calling on behalf of [employer].  We were notified that your Coronavirus test (COVID-19) for was POSITIVE for the virus.  I have some information to relay to you but first I wanted to mention that the [place] Dept of Health will be contacting you shortly [it's possible MD already called Patient] to talk to you more about how you are feeling and other people you have had contact with who might now also have the virus.  Also, [facility] is Partnering with the [facility] for Covid-19 research, you may be contacted directly by research staff.\"    ssessment (Inquire about Patient's current symptoms)   Assessment   Current Symptoms at time of phone call: (if no symptoms, document No symptoms] None   Symptom onset (if applicable) N/A     If at time of call, Patients symptoms hare worsened, the Patient should contact 911 or have someone drive them to Emergency Dept promptly:      If Patient calling 911, inform 911 personal that you have tested positive for the Coronavirus (COVID-19).  Place mask on and await 911 to arrive.    If Emergency Dept, If possible, please have another adult drive you to the " Emergency Dept but you need to wear mask when in contact with other people.      Review information with Patient    Your result was positive. This means you have COVID-19 (coronavirus).  We have sent you a letter that reviews the information that I'll be reviewing with you now.    How can I protect others?    If you have symptoms: stay home and away from others (self-isolate) until:    You've had no fever--and no medicine that reduces fever--for 1 full day (24 hours). And      Your other symptoms have gotten better. For example, your cough or breathing has improved. And     At least 10 days have passed since your symptoms started. (If you ve been told by a doctor that you have a weak immune system, wait 20 days.)     If you don't have symptoms: Stay home and away from others (self-isolate) until at least 10 days have passed since your first positive COVID-19 test. (Date test collected).    During this time:    Stay in your own room, including for meals. Use your own bathroom if you can.    Stay away from others in your home. No hugging, kissing or shaking hands. No visitors.     Don't go to work, school or anywhere else.     Clean  high touch  surfaces often (doorknobs, counters, handles, etc.). Use a household cleaning spray or wipes. You'll find a full list on the EPA website at www.epa.gov/pesticide-registration/list-n-disinfectants-use-against-sars-cov-2.     Cover your mouth and nose with a mask, tissue or other face covering to avoid spreading germs.    Wash your hands and face often with soap and water.    Caregivers in these groups are at risk for severe illness due to COVID-19:  o People 65 years and older  o People who live in a nursing home or long-term care facility  o People with chronic disease (lung, heart, cancer, diabetes, kidney, liver, immunologic)  o People who have a weakened immune system, including those who:  - Are in cancer treatment  - Take medicine that weakens the immune system, such as  corticosteroids  - Had a bone marrow or organ transplant  - Have an immune deficiency  - Have poorly controlled HIV or AIDS  - Are obese (body mass index of 40 or higher)  - Smoke regularly    Caregivers should wear gloves while washing dishes, handling laundry and cleaning bedrooms and bathrooms.    Wash and dry laundry with special caution. Don't shake dirty laundry, and use the warmest water setting you can.    If you have a weakened immune system, ask your doctor about other actions you should take.    For more tips, go to www.cdc.gov/coronavirus/2019-ncov/downloads/10Things.pdf.    You should not go back to work until you meet the guidelines above for ending your home isolation. You should meet these along with any other guidelines that your employer has.    Employers: This document serves as formal notice of your employee's medical guidelines for going back to work. They must meet the above guidelines before going back to work in person.    How can I take care of myself?    1. Get lots of rest. Drink extra fluids (unless a doctor has told you not to).    2. Take Tylenol (acetaminophen) for fever or pain. If you have liver or kidney problems, ask your family doctor if it's okay to take Tylenol.     Take either:     650 mg (two 325 mg pills) every 4 to 6 hours, or     1,000 mg (two 500 mg pills) every 8 hours as needed.     Note: Don't take more than 3,000 mg in one day. Acetaminophen is found in many medicines (both prescribed and over-the-counter medicines). Read all labels to be sure you don't take too much.    For children, check the Tylenol bottle for the right dose (based on their age or weight).    3. If you have other health problems (like cancer, heart failure, an organ transplant or severe kidney disease): Call your specialty clinic if you don't feel better in the next 2 days.    4. Know when to call 911: Emergency warning signs include:    Trouble breathing or shortness of breath    Pain or pressure  in the chest that doesn't go away    Feeling confused like you haven't felt before, or not being able to wake up    Bluish-colored lips or face    5. Sign up for [employer]. We know it's scary to hear that you have COVID-19. We want to track your symptoms to make sure you're okay over the next 2 weeks. Please look for an email from [employer]--this is a free, online program that we'll use to keep in touch. To sign up, follow the link in the email. Learn more at [email or web address].    Where can I get more information?    [facility]: [email or web address]    Coronavirus Basics: www.health.[facility].[place]/diseases/coronavirus/basics.html    What to Do If You're Sick: www.cdc.gov/coronavirus/2019-ncov/about/steps-when-sick.html    Ending Home Isolation: www.cdc.gov/coronavirus/2019-ncov/hcp/disposition-in-home-patients.html     Caring for Someone with COVID-19: www.cdc.gov/coronavirus/2019-ncov/if-you-are-sick/care-for-someone.html     [facility] clinical trials (COVID-19 research studies): clinicalaffairs.[facility]/n-clinical-trials     A Positive COVID-19 letter will be sent via [employer] or the Mail.  (Exception, no letters sent to Presurgerical/Preprocedure Patients)    Melody Malloy RN [facility] 9/21/2020 11:59 AM

## 2021-06-11 NOTE — TELEPHONE ENCOUNTER
No new recs at this time as he seems asymptomatic with 2 pound weight gain. If continues to gain weight or symptoms please have him call clinic. Thank you

## 2021-06-11 NOTE — TELEPHONE ENCOUNTER
Recurring  Every two week pericentesis procedure arranged with ultrasound scheduling for Campos. sk/RN

## 2021-06-11 NOTE — TELEPHONE ENCOUNTER
Coronavirus (COVID-19) Notification    Reason for call  Notify of POSITIVE  COVID-19 lab result, assess symptoms,  review [facility] recommendations    Lab Result   Lab test for 2019-nCoV rRt-PCR or SARS-COV-2 PCR  Oropharyngeal AND/OR nasopharyngeal swabs were POSITIVE for 2019-nCoV RNA [OR] SARS-COV-2 RNA (COVID-19) RNA     We have been unable to reach Patient by phone at this time to notify of their Positive COVID-19 result.  Left voicemail message requesting a call back to [phone] [facility] for results.        POSITIVE COVID-19 Letter sent.    [Name]  Fiona Gifford RN  [facility]  COVID19 Results Team RN  Ph# [phone]

## 2021-06-11 NOTE — TELEPHONE ENCOUNTER
Provider Communication  Who is calling:  Collins Bledsoe NP  Facility in which provider is associated:  [facility]  Reason for call:    Collins would like to discuss patient's diagnosis of liver cirrhosis in more detail.  Please reach out to Collins and advise.  Urgency for return call:  as available  Okay to leave detailed message?:  Yes

## 2021-06-11 NOTE — PROGRESS NOTES
Pulmonary Clinic Outpatient Follow-Up  9/17/2020     Assessment and Plan:   86 year old man with the most pertinent history of COPD with chronic cough, presenting for follow up of his COPD.    #.  COPD; chronic cough resolved with Advair.  Single episode of pneumonia in January 2020, no history of exacerbations otherwise.  #.  Cough associated with liquids, I am highly suspicious for an aspiration issue.  #.  Diastolic heart failure associated with recurrent ascites.      Continue with twice daily Advair    Patient declined a trial of add on Spiriva to see if it helps with his exertional dyspnea    Continue with as needed albuterol    RTC in person in 12 month(s).    Cortney Salas MD  Pulmonary and Critical Care   ______________________________________________________________________________    CC: COPD    HPI:   Campos Ocampo is a 87 y.o. never smoker with history of COPD w/ chronic cough & dementia, presenting today for COPD. Last seen 11/05/19 & switched his Advair to Dulera due to voice hoarseness associated with Advair.  About a month later he went back on Advair with a plan to watch his symptoms to see if they recur.    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  He is being managed with bumetanide.  He is on sotalol and Xarelto for chronic atrial fibrillation.    Patient reports that he is doing worse overall.  His breathing worsened since his bout of community-acquired pneumonia back in January 2020.  His chronic cough has resolved since he started using Advair, which he still uses twice a day.  Now his wife reports remittent cough associated with drinking thin liquids; patient is adamant that this is not a swallowing issue.  Since diagnosis of his congestive heart failure he is having significant issues with fatigue and exertional dyspnea.  He feels  that PRN albuterol is not helpful at all for his dyspnea (this suggests that the dyspnea is related to his heart failure rather than his COPD).    Patient seen in presence of his wife, was able to provide most information.    ROS:  Review of Systems - 10-point ROS reviewed and found to be negative w/ exception as detailed in the HPI.    PMH:  Patient Active Problem List    Diagnosis Date Noted     Dependence on nocturnal oxygen therapy 05/31/2020     S/P abdominal paracentesis 05/31/2020     NYHA class 3 heart failure with preserved ejection fraction (H) 05/26/2020     Leg swelling      Dyspnea 05/11/2020     Bilateral lower extremity edema 05/30/2019     VILLEGAS (dyspnea on exertion) 12/21/2018     Persistent atrial fibrillation      Thrombocytopenia (H) 11/05/2018     Low hemoglobin 11/01/2018     Hypersomnia 11/01/2018     Severe chronic obstructive pulmonary disease (H) 11/01/2018     History of kidney stones - s/p right lithotripsy and cystoscopy with urology 8/10/2017 09/11/2018     Hyperlipidemia LDL goal <100 09/06/2018     BPH (benign prostatic hyperplasia) 09/06/2018     Essential hypertension 11/05/2015     Diabetes mellitus type II, non insulin dependent (H) 11/05/2015     Erectile dysfunction, unspecified erectile dysfunction type 06/11/2015     Environmental allergies 12/10/2014     Hypothyroidism 02/11/2011     PSH:  Past Surgical History:   Procedure Laterality Date     CARDIOVERSION  02/11/2019    nsr x 1 min, then afib     CHOLECYSTECTOMY       eyelid surgery Left 04/2018     KIDNEY STONE SURGERY  2018     US PARACENTESIS  5/27/2020     US PARACENTESIS  7/16/2020     US PARACENTESIS  7/27/2020     US PARACENTESIS  8/17/2020     US PARACENTESIS  8/26/2020     US PARACENTESIS  9/9/2020     Allergies:  Allergies   Allergen Reactions     Penicillins Unknown     Tolerated CTX Jan 2020     Family HX:  Family History   Problem Relation Age of Onset     Stroke Mother         cerebral hemorrage     Emphysema  Father      Social Hx:  Social History     Socioeconomic History     Marital status:      Spouse name: Not on file     Number of children: 4     Years of education: Not on file     Highest education level: Not on file   Occupational History     Not on file   Social Needs     Financial resource strain: Not on file     Food insecurity     Worry: Not on file     Inability: Not on file     Transportation needs     Medical: Not on file     Non-medical: Not on file   Tobacco Use     Smoking status: Never Smoker     Smokeless tobacco: Never Used   Substance and Sexual Activity     Alcohol use: Yes     Alcohol/week: 1.0 standard drinks     Types: 1 Glasses of wine per week     Comment: rare     Drug use: No     Sexual activity: Never   Lifestyle     Physical activity     Days per week: Not on file     Minutes per session: Not on file     Stress: Not on file   Relationships     Social connections     Talks on phone: Not on file     Gets together: Not on file     Attends Congregational service: Not on file     Active member of club or organization: Not on file     Attends meetings of clubs or organizations: Not on file     Relationship status: Not on file     Intimate partner violence     Fear of current or ex partner: Not on file     Emotionally abused: Not on file     Physically abused: Not on file     Forced sexual activity: Not on file   Other Topics Concern     Not on file   Social History Narrative    Has 4 sons. Has 7 grandchildren. Has 1 great grandchild.     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     Has been  63 years in 2018.     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      Current Meds:  Current Outpatient Medications   Medication Sig Dispense Refill     albuterol (PROAIR HFA;PROVENTIL HFA;VENTOLIN HFA) 90 mcg/actuation inhaler Inhale 2 puffs every 4 (four) hours as  "needed for wheezing or shortness of breath. Use with spacer 1 Inhaler 11     bumetanide (BUMEX) 1 MG tablet Take 2 tablets (2 mg total) by mouth 2 (two) times a day. 60 tablet 11     cholecalciferol, vitamin D3, 1,000 unit tablet Take 1,000 Units by mouth daily.       fluticasone propion-salmeterol (ADVAIR DISKUS) 250-50 mcg/dose DISKUS Inhale 1 puff 2 (two) times a day. 3 each 3     levothyroxine (SYNTHROID, LEVOTHROID) 175 MCG tablet TAKE 1 TABLET BY MOUTH EVERY MORNING AT 6 AM 90 tablet 0     magnesium oxide (MAGOX) 400 mg (241.3 mg magnesium) tablet Take 1 tablet (400 mg total) by mouth 2 (two) times a day. (Patient taking differently: Take 400 mg by mouth daily. ) 200 tablet 1     memantine (NAMENDA) 5 MG tablet Take 5 mg by mouth at bedtime.   1     metFORMIN (GLUCOPHAGE) 500 MG tablet Take 1 tablet (500 mg) by mouth 2 times a day with meals. 180 tablet 0     multivitamin with minerals (THERA-M) 9 mg iron-400 mcg Tab tablet Take 1 tablet by mouth daily.       sotaloL (BETAPACE) 80 MG tablet Take 1 tablet (80 mg total) by mouth 2 (two) times a day. 180 tablet 3     XARELTO 20 mg tablet TAKE 1 TABLET DAILY WITH SUPPER 90 tablet 1     No current facility-administered medications for this visit.      Physical Exam:  BP 94/60   Pulse (!) 58   Ht 6' 1\" (1.854 m)   Wt 163 lb (73.9 kg)   SpO2 96% Comment: RA  BMI 21.51 kg/m    Gen: elderly man, cachectic, appears chronically ill, alert, oriented, no distress  HEENT: masked, no stridor, no cough  CV: bradycardic but regular  Resp: equal bilateral air entry, breath sounds distant but clear throughout, no focal crackles or wheezes  Abd: distended  Skin: no apparent rashes  Ext: BLE edema w/ compression stockings in place  Neuro: alert, markedly slowed speech & reaction time    Imaging studies: personally reviewed. Below are the Radiology interpretations.  #.  CT chest, noncontrast, 3/15/2019:  LUNGS AND PLEURA: Severe emphysema. There is scarring in the lateral left " upper lobe. Mild cylindrical bronchiectasis in both lower lobes. Focal atelectasis or mild consolidation in the posterior costophrenic sulcus on the right. No pulmonary nodules. Trace left pleural effusion.  MEDIASTINUM: No lymphadenopathy. Moderate coronary artery calcification.  LIMITED UPPER ABDOMEN: Trace perihepatic ascites. Splenomegaly.  MUSCULOSKELETAL: There are multiple old right rib fractures.     #.  CXR, 2 view, 8/9/2019:  Severe emphysema noted with bullous changes in the upper lobes. Minimal midlung scarring in the left midlung. No signs of pneumonia or failure. Heart and pulmonary vascularity are normal.     PFT's   #.  10/2018: Severe airflow obstruction with clinically significant bronchodilator response, normal lung volumes, no hyperinflation, but notable air trapping. FEV1/FVC is 0.52 and is reduced. FEV1 is 1.27 L (45 % predicted) and is reduced. FVC is 2.46 L (83 % predicted) and reduced. There was improvement in spirometry after a single inhaled dose of bronchodilator. TLC is 7 L (96 % predicted) and is normal.  Patient could not perform DLCO maneuver.     #.  03/2019: Severe airflow obstruction, normal diffusion capacity. FEV1/FVC is 0.58 and is reduced. FEV1 is 1.39 L (49 % predicted) and is reduced. FVC is 2.38 L (62 % predicted) and reduced. DLCO is 59 % predicted and is normal when it is corrected for hemoglobin.

## 2021-06-12 NOTE — SEDATION DOCUMENTATION
Patient tolerated paracentesis with albumin with no concerns expressed or observed.    6 L removed with 50 grams albumin given per order.

## 2021-06-12 NOTE — PROGRESS NOTES
Nurse called Collins Bledsoe from [facility] and informed him of pt low BP.  Collins was ok with pt going home with family as long as he was asymptomatic.  Pt did not have any sxs at this time and wanted to go home.  Nurse talked with Dr Parker and Dr Parker put in an order for a pleurx drain to be placed.  Pt will return in about a week to have this placed and then go on hospice.  Pts son and wife were informed of this pleurx drain being placed.   Pt left stable with son and wife via w/c.

## 2021-06-12 NOTE — PATIENT INSTRUCTIONS - HE
Campos Ocampo,    It was a pleasure to see you today at the [facility].     My recommendations after this visit include:  - You will be called with the results of your labs.   - Please call my nurse Mervat if you have any concerns: [phone]    Jennifer Nye, CNP

## 2021-06-12 NOTE — TELEPHONE ENCOUNTER
FYI - Status Update  Who is Calling: Home Care nurse Sunita  Update: Home care nurse is calling in to update provider  That patient is enrolled for Hospice care today , Hospice care will update by fax every two weeks going on cares .  Okay to leave a detailed message?:  No

## 2021-06-12 NOTE — TELEPHONE ENCOUNTER
----- Message from Sandro Parker MD sent at 10/7/2020  8:05 AM CDT -----  Please contact wife Suzie  At this time- the policy on testing for COVID has changed after a positive result.  We are not testing within 90 days of a positive result- this will not be required for procedures.  This is due to the fact that some will test positive with the PCR test even though they are not viral shedding still- so it is just recommended to follow the quarantine guidelines.  Dr. Parker

## 2021-06-12 NOTE — TELEPHONE ENCOUNTER
Beth  There is been a policy change and we are not retesting patients who have tested positive for Covid 90 days after their previous positive test.  He does not need a Covid test.  Dr. Parker

## 2021-06-12 NOTE — TELEPHONE ENCOUNTER
Who is calling:  Wife of patient  Reason for Call:  States they were never told what to do next to get her husbands paracenteses done. They canceled his last one due to his INR was at 5.5, but was not told what to do.  Please advise.  Okay to leave a detailed message: Yes

## 2021-06-12 NOTE — TELEPHONE ENCOUNTER
Called and discussed with wife- they would like to consider hopsice consult to see if he qualifies and what they could offer. They are wondering if he would be able to continue with paracentesis.  Will place referral to hospice.  Dr. Parker

## 2021-06-12 NOTE — TELEPHONE ENCOUNTER
Orders being requested: paracentesis with pleurex drain placement   Reason service is needed/diagnosis: ascites   When are orders needed by: asap, patient has appointment already for this on 11/3/2020 @ [facility]  Where to send Orders: Phone:  [phone]  Okay to leave detailed message?  Yes

## 2021-06-12 NOTE — TELEPHONE ENCOUNTER
Left detailed message for patient that Dr. Parker has sent [employer] message.  If patient calls back please relay message below from Dr. Parker to patient.

## 2021-06-12 NOTE — TELEPHONE ENCOUNTER
Wellness Screening Tool  Symptom Screening:  Do you have one of the following NEW symptoms:    Fever (subjective or >100.0)?  No    A new cough?  No    Shortness of breath?  No     Chills? No     New loss of taste or smell? No     Generalized body aches? No     New persistent headache? No     New sore throat? No     Nausea, vomiting, or diarrhea?  No    Within the past 2 weeks, have you been exposed to someone with a known positive illness below:    COVID-19 (known or suspected)?  No    Chicken pox?  No    Mealses?  No    Pertussis?  No    Patient notified of visitor policy- They may have one person accompany them to their appointment, but they will need to wear a mask and will be screened upon arrival for symptoms: Yes  Pt informed to wear a mask: Yes  Pt notified if they develop any symptoms listed above, prior to their appointment, they are to call the clinic directly at [phone] for further instructions.  Yes  Patient's appointment status: Patient will be seen in clinic as scheduled on 10/12

## 2021-06-12 NOTE — TELEPHONE ENCOUNTER
----- Message from Marta Jolley RN sent at 10/19/2020 12:43 PM CDT -----  Regarding: FW: Palliative Care Consultation  Can someone please schedule this pt with MIRTHA Delaney? Thanks  ----- Message -----  From: Allan Helton  Sent: 10/19/2020  10:43 AM CDT  To: Marta Jolley RN  Subject: Palliative Care Consultation                     Marta,    Could you please forward this on to the Lung Clinic Schedulers.    This patient was referred back in July and is now interested in meeting with palliative care.    Please have them call Beth @ [phone] ASAP    Flo

## 2021-06-12 NOTE — TELEPHONE ENCOUNTER
Phone call to patients wife, Beth, she states that the patient's Xarelto has been discontinued by his PMD.    Explained that prescriptions cannot usually be refunded or returned due to safety concerns.  She states disappointment but understanding.

## 2021-06-12 NOTE — TELEPHONE ENCOUNTER
PHILLIP:  Recieved call from Collins Garcia NP at [facility]. Patient INR was 5.5 today and doesn't think it is related to the patient liver. Patient is currently taking Xarelto.  Patient's wife said they aren't going to do anything about it. He just want  to be aware.

## 2021-06-16 PROBLEM — I50.30 NYHA CLASS 3 HEART FAILURE WITH PRESERVED EJECTION FRACTION (H): Status: ACTIVE | Noted: 2020-01-01

## 2021-06-16 PROBLEM — R06.09 DOE (DYSPNEA ON EXERTION): Status: ACTIVE | Noted: 2018-12-21

## 2021-06-16 PROBLEM — Z87.442 HISTORY OF KIDNEY STONES: Status: ACTIVE | Noted: 2018-09-11

## 2021-06-16 PROBLEM — E78.5 HYPERLIPIDEMIA LDL GOAL <100: Status: ACTIVE | Noted: 2018-09-06

## 2021-06-16 PROBLEM — J44.9 SEVERE CHRONIC OBSTRUCTIVE PULMONARY DISEASE (H): Status: ACTIVE | Noted: 2018-11-01

## 2021-06-16 PROBLEM — D64.9 LOW HEMOGLOBIN: Status: ACTIVE | Noted: 2018-11-01

## 2021-06-16 PROBLEM — Z98.890 S/P ABDOMINAL PARACENTESIS: Status: ACTIVE | Noted: 2020-01-01

## 2021-06-16 PROBLEM — N40.0 BPH (BENIGN PROSTATIC HYPERPLASIA): Status: ACTIVE | Noted: 2018-09-06

## 2021-06-16 PROBLEM — D69.6 THROMBOCYTOPENIA (H): Status: ACTIVE | Noted: 2018-11-05

## 2021-06-16 PROBLEM — R06.00 DYSPNEA: Status: ACTIVE | Noted: 2020-01-01

## 2021-06-16 PROBLEM — G47.10 HYPERSOMNIA: Status: ACTIVE | Noted: 2018-11-01

## 2021-06-16 PROBLEM — R60.0 BILATERAL LOWER EXTREMITY EDEMA: Status: ACTIVE | Noted: 2019-05-30

## 2021-06-16 PROBLEM — Z99.81 DEPENDENCE ON NOCTURNAL OXYGEN THERAPY: Status: ACTIVE | Noted: 2020-01-01

## 2021-06-17 NOTE — TELEPHONE ENCOUNTER
Telephone Encounter by Mervat Garcia RN at 6/25/2020  3:12 PM     Author: Mervat Garcia RN Service: -- Author Type: Registered Nurse    Filed: 6/25/2020  3:17 PM Encounter Date: 6/25/2020 Status: Signed    : Mervat Garcia RN (Registered Nurse)       Winsome Davis MD Keune, Shelly A, RN    Caller: Unspecified (Today, 12:11 PM)               Thanks Mervat. Let's direct admit him for IV diuretics. He can be admitted to [place].     ~ Winsome      No [facility] appointments available until next week Thursday. Jennifer could have opening for phone visit w/ him tomorrow, but given his symptoms and his recent abdominal ascites w/ paracentesis, (6liters) removed 5/26-5/28 admission, Jennifer feels best to consider direct admit for diuresis, evaluation of ascites recurrence. sk/RN

## 2021-06-19 NOTE — LETTER
Letter by Henry Rodriguez MD at      Author: Henry Rodriguez MD Service: -- Author Type: --    Filed:  Encounter Date: 5/17/2019 Status: (Other)         Kimmy He MD  [address]  [facility] [address]                                  May 19, 2019    Patient: Campos Ocampo   MR Number: [id number]   YOB: 1933   Date of Visit: 5/17/2019     Dear Dr. Neri MD:    Thank you for referring Campos Ocampo to me for evaluation. Below are the relevant portions of my assessment and plan of care.    If you have questions, please do not hesitate to call me. I look forward to following Campos along with you.    Sincerely,        Henry Rodriguez MD          CC  No Recipients  Henry Rodriguez MD  5/19/2019  8:02 AM  Sign at close encounter  Assessment and Plan:  86 yo male with history of HTN, DM, never smoker, presented to pulmonary clinic 12/21/2018 for evaluation of chronic dyspnea on exertion. PFTs show severe obstruction with positive bronchodilator response and probably underestimated lung volumes as these were done with N2 washout. Unclear why he should have any obstruction as he is essentially a never smoker. No relevant occupational exposures. He did not improve with trial of ICS/LABA. He appeared to be in atrial fibrillation/atrial flutter with RVR based on EKG done in clinic, and was sent to the ED for evaluation.  Atrial fibrillation was confirmed, an outpatient cardioversion was attempted however unsuccessful, and the patient is now rate controlled.        He tried Symbicort before, but did not tolerate it.  That his pulmonary function tests and CT show severe COPD & emphysema is surprising in that he has never smoked, and has had no occupational exposures nor secondhand smoke exposure aside from living with his father up until the age of 18 years old. He was started on Spiriva 3/6 and states that he has not had significant dyspnea since then  and is rarely needing to use his rescue albuterol inhaler.      3/15/2019 chest CT: severe emphysema, mild BLL bronchiectasis  3/15/2019 devin: severe COPD, FEV1 & FVC not significantly changed from 10/23/2018 PFTs, diffusing capacity moderately reduced  5/17/2019 STOP BANG questionnaire: Score of 3 for feeling fatigue during the daytime, age over 50 years old and male gender, indicating a high risk of MARLEN  Bronchiectasis work-up labs were unremarkable  Alpha-1 antitrypsin level 101, heterozygous for the Z allelle      On 5/17/2019 follow-up visit the patient complained of increased cough and dyspnea on exertion over the past week, concerning for acute bronchitis.  He also again reports feeling significantly fatigued during the daytime but has not yet informed his primary doctor or sought work-up for it.  He states that he is tried the Spiriva we prescribed however it does not feel it is helping, and does not believe that his cough improved at all since starting ranitidine       Recommendations:  Z-pack x5 days for suspected acute bronchitis contributing to the increased cough and dyspnea on exertion of    Sleep test and clinic eval, discussed in length with the patient who is now agreeable    Pulmonary rehab ordered and advised, however patient states that he will think about it and is unsure whether he wants to proceed    Stop ranitidine and Spiriva    Return to clinic in 3 months       CCx: Cough and shortness of breath    HPI: Mr. Ocampo states that he is still having a cough and shortness of breath that has improved at all with ranitidine or Spiriva.  He believes that both of these have increased over the past week.  He still feels significantly tired and fatigued during the daytime, however has not sought work-up or treatment from his primary care physician.  He states that he feels very frustrated because nothing that he has tried so far have symptoms.  On the other hand however, he states that they are not  "bad enough for him to want to try pulmonary rehab    ROS:  A review of 12 organ systems was performed with pertinent positives and negatives noted in the HPI.      Current Meds:  Current Outpatient Medications   Medication Sig   ? albuterol (PROAIR HFA;PROVENTIL HFA;VENTOLIN HFA) 90 mcg/actuation inhaler Inhale 2 puffs every 6 (six) hours as needed for wheezing or shortness of breath. Use with spacer   ? cholecalciferol, vitamin D3, 1,000 unit tablet Take 1,000 Units by mouth daily.   ? levothyroxine (SYNTHROID, LEVOTHROID) 175 MCG tablet TAKE 1 TABLET BY MOUTH EVERY MORNING AT 6 AM   ? metFORMIN (GLUCOPHAGE) 500 MG tablet Take 1 tablet (500 mg total) by mouth 2 (two) times a day with meals.   ? multivitamin with minerals (THERA-M) 9 mg iron-400 mcg Tab tablet Take 1 tablet by mouth daily.   ? niacin 500 MG tablet Take 500 mg by mouth.   ? ranitidine (ZANTAC) 150 MG tablet Take 1 tablet (150 mg total) by mouth 2 (two) times a day.   ? rivaroxaban 20 mg Tab Take 1 tablet (20 mg total) by mouth daily with supper.   ? sotalol (BETAPACE) 80 MG tablet Take 1 tablet (80 mg total) by mouth 2 (two) times a day.   ? tiotropium (SPIRIVA) 18 mcg inhalation capsule Place 1 capsule (2 puffs total) into inhaler and inhale daily.   ? azithromycin (ZITHROMAX) 250 MG tablet Take 1 tablet (250 mg total) by mouth daily for 5 days. Take 500 mg (2 x 250 mg tablets) on day 1 followed by 250 mg (1 tablet) on days 2-5.       Labs:  No results found for this or any previous visit (from the past 72 hour(s)).    I have personally reviewed all pertinent imaging studies and PFT results unless otherwise noted.    Imaging studies:  No results found.      Physical Exam:  /62   Pulse 66   Resp 12   Ht 5' 11.25\" (1.81 m)   Wt 201 lb (91.2 kg)   SpO2 94%   BMI 27.84 kg/m     General - Well nourished.  Appears fatigued and somewhat irritated  Ears/Mouth -  OP pink moist, no thrush  Neck - no cervical lymphadenopathy  Lungs - Clear to " ausculation bilaterally, no crackles or wheezes appreciated  CVS - regular rhythm with no murmurs, rubs or gallups  Abdomen - soft, NT, ND, NABS  Ext - no cyanosis, clubbing or edema  Skin - no rash  Psychology - alert and oriented, answers appropriate        Electronically signed by:    Rickey Rodriguez MD  [facility] Pulmonary and Critical Care Medicine    Dasha Gilmore CMA  5/17/2019  9:37 AM  Sign at close encounter  Oxygen saturation walk test    Patient oxygen saturation on RA at rest is 96%.  Oxygen saturation after ambulating 300ft on RA is 92%.  Patient is ambulatory within his/her home.

## 2021-06-19 NOTE — LETTER
Letter by Henry Rodriguez MD at      Author: Henry Rodriguez MD Service: -- Author Type: --    Filed:  Encounter Date: 3/19/2019 Status: (Other)         Kimmy He MD  [address]  [facility] [address]                                  March 20, 2019    Patient: Campos Ocampo   MR Number: [id number]   YOB: 1933   Date of Visit: 3/19/2019     Dear Dr. Neri MD:    Thank you for referring Campos Ocampo to me for evaluation. Below are the relevant portions of my assessment and plan of care.    If you have questions, please do not hesitate to call me. I look forward to following Campos along with you.    Sincerely,        Henry Rodriguez MD          CC  No Recipients  Henry Rodriguez MD  3/20/2019 12:03 PM  Sign at close encounter  Assessment and Plan:  85 male with history of HTN, DM, never smoker, presented to pulmonary clinic 12/21/2018 for evaluation of chronic dyspnea on exertion. PFTs show severe obstruction with positive bronchodilator response and probably underestimated lung volumes as these were done with N2 washout. Unclear why he should have any obstruction as he is essentially a never smoker. No relevant occupational exposures. He did not improve with trial of ICS/LABA. He appeared to be in atrial fibrillation/atrial flutter with RVR based on EKG done in clinic, and was sent to the ED for evaluation.  Atrial fibrillation was confirmed, an outpatient cardioversion was attempted however unsuccessful, and the patient is now rate controlled.       He tried Symbicort before, but did not tolerate it.   That his pulmonary function tests and CT show severe COPD & emphysema is surprising in that he has never smoked, and has had no occupational exposures nor secondhand smoke exposure aside from living with his father up until the age of 18 years old. He was started on Spiriva 3/6 and states that he has not had significant dyspnea since then  and is rarely needing to use his rescue albuterol inhaler.     3/15/2019 chest CT: severe emphysema, mild BLL bronchiectasis  3/15/2019 devin: severe COPD, FEV1 & FVC not significantly changed from 10/23/2018 PFTs. Diffusing capacity moderately reduced     Recommendations:  -Alpha-1 antitrypsin level  was on the low end of normal, genotyping ordered given degree of COPD/emphysema being out of proportion with his smoking hx  -bronchiectasis workup labs ordered  -send sputum for culture if patient produces any  -cont Spiriva and albuterol as needed  -start Zantac two times a day, elevate HOB for suspected reflux aspiration component of LL bronchiectasis  -Patient declines pulmonary rehab as he feels his breathing is not significantly limiting his mobility at this time  -patient has attempted flutter valve, but stopped using as he did not feel it helped  -Return to pulmonary clinic in 2 months to follow-up on above    Note: patient also complaining of urinary frequency waking him up 2-3 x's per night, denies isomnia at other times. He states he has not yet sought workup for this from his primary MD or a urologist - I encouraged him to seek workup and treatment from his PCP     CCx: dyspnea    HPI: Patient states since starting Spiriva he has not had significant dyspnea and is rarely needing to use his rescue albuterol inhaler. He attempted a flutter valve, but stopped using as he did not feel it helped. He denies cough, f/c, or other resp complaints. His main complaint now is urinary frequency at night.    ROS:  A review of 12 organ systems was performed with pertinent positives and negatives noted in the HPI.      Current Meds:  Current Outpatient Medications   Medication Sig   ? albuterol (PROAIR HFA;PROVENTIL HFA;VENTOLIN HFA) 90 mcg/actuation inhaler Inhale 2 puffs every 6 (six) hours as needed for wheezing or shortness of breath. Use with spacer   ? cholecalciferol, vitamin D3, 1,000 unit tablet Take 1,000 Units  by mouth daily.   ? diltiazem (CARDIZEM CD) 120 MG 24 hr capsule Take 1 capsule (120 mg total) by mouth daily.   ? flecainide (TAMBOCOR) 100 MG tablet Take 1 tablet (100 mg total) by mouth 2 (two) times a day.   ? levothyroxine (SYNTHROID, LEVOTHROID) 175 MCG tablet TAKE 1 TABLET BY MOUTH EVERY MORNING AT 6 AM   ? losartan (COZAAR) 25 MG tablet Take 1 tablet (25 mg total) by mouth daily.   ? metFORMIN (GLUCOPHAGE) 500 MG tablet Take 1 tablet (500 mg total) by mouth 2 (two) times a day with meals.   ? multivitamin with minerals (THERA-M) 9 mg iron-400 mcg Tab tablet Take 1 tablet by mouth daily.   ? niacin 500 MG tablet Take 500 mg by mouth.   ? rivaroxaban 20 mg Tab Take 1 tablet (20 mg total) by mouth daily with supper.   ? tiotropium (SPIRIVA) 18 mcg inhalation capsule Place 1 capsule (2 puffs total) into inhaler and inhale daily.   ? ranitidine (ZANTAC) 150 MG tablet Take 1 tablet (150 mg total) by mouth 2 (two) times a day.       Labs:  No results found for this or any previous visit (from the past 72 hour(s)).    I have personally reviewed all pertinent imaging studies and PFT results unless otherwise noted.    Imaging studies:  Ct Chest Without Contrast    Result Date: 3/15/2019  EXAM: CT CHEST WO CONTRAST LOCATION: [facility] DATE/TIME: 3/15/2019 8:58 AM INDICATION: Dyspnea chronic, had xray. COMPARISON: Chest x-ray 12/21/2018. TECHNIQUE: Helical images were obtained through the chest. Multiplanar reformats were obtained. Dose reduction techniques were used. IV CONTRAST: None. FINDINGS: LUNGS AND PLEURA: Severe emphysema. There is scarring in the lateral left upper lobe. Mild cylindrical bronchiectasis in both lower lobes. Focal atelectasis or mild consolidation in the posterior costophrenic sulcus on the right. No pulmonary nodules. Trace left pleural effusion. MEDIASTINUM: No lymphadenopathy. Moderate coronary artery calcification. LIMITED UPPER ABDOMEN: Trace perihepatic ascites. Splenomegaly.  "MUSCULOSKELETAL: There are multiple old right rib fractures.     CONCLUSION: 1.  Severe emphysema. 2.  Bronchiectasis in both lower lobes. 3.  Focal atelectasis or less likely infiltrate in the right lower lobe posterior costophrenic sulcus. 4.  Minimal ascites and splenomegaly noted in the upper abdomen.         Physical Exam:  /60   Pulse 73   Ht 5' 10.5\" (1.791 m)   Wt 196 lb (88.9 kg)   SpO2 93%   BMI 27.73 kg/m     General - Well nourished  Ears/Mouth -  OP pink moist, no thrush  Neck - no cervical lymphadenopathy  Lungs - Clear to ausculation bilaterally   CVS - regular rhythm with no murmurs, rubs or gallups  Abdomen - soft, NT, ND, NABS  Ext - no cyanosis, clubbing or edema  Skin - no rash  Psychology - alert and oriented, answers appropriate        Electronically signed by:    Rickey Rodriguez MD  [facility] Pulmonary and Critical Care Medicine       "

## 2021-06-20 NOTE — PROGRESS NOTES
ASSESSMENT/ PLAN    1. Establishing care with new doctor, encounter for  Reviewed chart.    2. SOB (shortness of breath)  Going on for a year, related with exertion, no chest pain. Exercise stress test negative about 3 weeks ago. Will get record from [facility] for review. Not sure if he got chest imaging and pulmonary function test but these would be next step to evaluate his dyspnea. He reports he has an ongoing cough and was given prednisone before.   - HM1(CBC and Differential)  - Comprehensive Metabolic Panel  - Glycosylated Hemoglobin A1c  - HM1 (CBC with Diff)    3. Essential hypertension  Well controlled. Refilled Hyzaar. Will have him decrease atenolol from 50 mg to 25 mg daily and if blood pressure still within normal range, then he can stop atenolol altogether. His HR is low 60's.   - losartan-hydrochlorothiazide (HYZAAR) 50-12.5 mg per tablet; Take 1 tablet by mouth.    4. Hypothyroidism  Was on 125 mcg for low TSH but didn't feel good so went back to 150 mcg on his own and has been feeling better.   - levothyroxine (SYNTHROID, LEVOTHROID) 150 MCG tablet; TAKE 1 TABLET BY MOUTH EVERY MORNING AT 6 AM  - Thyroid Stimulating Hormone (TSH)    5. Type 2 diabetes mellitus without complication, without long-term current use of insulin (H)  Last A1C 6.5. Will recheck today.  - metFORMIN (GLUCOPHAGE) 500 MG tablet; 500 mg daily with breakfast. ; Refill: 0  - Glycosylated Hemoglobin A1c    6. BPH (benign prostatic hyperplasia)  On Flomax. Follows with urology.     7. History of kidney stones  S/p right lithotripsy and cystoscopy with urology 8/10/2017. Apparently had abnormal bladder trabeculation and prostate enlargement on CT and being followed by urology. He does have follow up appointment.       Follow up in a month      Greater than 45 minutes was spent today in interview and examination with patient with more than 50% of that time in chart review, counseling and coordination of care.      This note was created  using Dragon dictation software, spelling errors may occur.     Kimmy He MD        SUBJECTIVE   Campos Ocampo is a 85 y.o. old male with a past medical history of HTN, HLD, hypothyroidism, BPH who presented to clinic today for further evaluation of establishing care with a new provider and concern about shortness of breath and decreased endurance with going upstairs. [facility] this complaint with stress test and apparently some stress imaging and was told everything was normal. He had exercise stress test 3 weeks ago and was normal. I don't have record of the stress test. He reports that he has been feeling shortness of breath with exertion going upstairs for about a year. He's ok walking on level ground, only has to stop once for shortness of breath, but as soon as he goes upstairs he gets shortness of breath and has to stop. He denies chest pain with exertion. He has no problem sleeping. He has a cough that was relieved with prednisone. Still has a cough once in a while. Denies fever/chills, night sweat, unintentional weight loss, lymph node swelling.        Review of Systems:  Negative except as noted in HPI    The following portions of the patient's history were reviewed and updated as appropriate: past medical history, past surgical history, family history, allergies, current medications and problem list.    Medical History  Active Ambulatory (Non-Hospital) Problems    Diagnosis     Hyperlipidemia LDL goal <100     BPH (benign prostatic hyperplasia)     Essential hypertension     Type 2 diabetes mellitus without complication (H)     Erectile dysfunction, unspecified erectile dysfunction type     Environmental allergies     Hypothyroidism     Past Medical History:   Diagnosis Date     Ptosis, left eyelid        Surgical History  He  has a past surgical history that includes Cholecystectomy; eyelid surgery (Left, 04/2018); and Kidney stone surgery  "(2018).    Social History  Reviewed, and he  reports that he has never smoked. He has never used smokeless tobacco. He reports that he drinks about 0.6 oz of alcohol per week  He reports that he does not use illicit drugs.   Social History     Social History Narrative    Has 4 sons. Has 7 grandchildren. Has 1 great grandchild.     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     Has been  63 years in 2018.     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        Family History  Reviewed, and family history includes Emphysema in his father; Stroke in his mother.    Medications  Reviewed and reconciled    Allergies  Allergies   Allergen Reactions     Penicillins Unknown         OBJECTIVE  Physical Exam:  Vital signs: /54 (Patient Site: Left Arm, Patient Position: Sitting, Cuff Size: Adult Regular) Comment: iq  Pulse 63  Temp 97.1  F (36.2  C) (Oral)   Ht 5' 11.5\" (1.816 m)  Wt 194 lb (88 kg)  SpO2 95%  BMI 26.68 kg/m2  Weight: 194 lb (88 kg)    General appearance: pleasant, appears stated age, cooperative and in no distress  Eyes: EOMs intact, PERRL, conjunctivae normal.   ENT: moist oral mucosa, posterior oropharynx normal, Thyroid normal to palpation, no lump or mass or tenderness  Lymph: no cervical/supraclavicular adenopathy  Respiratory: clear to auscultation bilaterally, good air movement throughout, no wheezing or crackles, speaking full sentences without difficulty  Cardiovascular: regular rate and rhythm, no murmur appreciated, no leg edema  Skin: no rashes  Neuro: alert oriented x 3, grossly normal otherwise  Psych: normal affect, appropriate conversation     "

## 2021-06-20 NOTE — LETTER
Letter by Betsy Powell RN at      Author: Betsy Powell RN Service: -- Author Type: --    Filed:  Encounter Date: 1/22/2020 Status: (Other)       [facility] Advance Care Planning  [facility]  [address] [place] [address]          Campos Ocampo  [address]  [place] [address]    Dear Campos,  We are writing on behalf of [facility]. [facility] is the department which coordinates documentation of decision-making authority.  Care providers are required to have copies of legal documents when a patient is unable to make their own health care decisions and the person has documented health care wishes and/or appointed a health care agent to make decisions on their behalf.      We have reviewed the Health Care Directive dated 12/03/2019 which you presented for addition to your medical record. Unfortunately, our review indicates the document is not legally valid for the following reason(s): The document is missing page(s) 3. In order to create a legal document you will need to send us the missing page(s) or another copy of the entire document. Our apologies if we made an error in copying the document.     We greatly value the opportunity to assist you in documenting your choices and to honor your   wishes. We apologize for any inconvenience. We have several options to assist you in updating   your document so it meets legal requirements.       Health Care Directives and Advance Care Planning resources can be viewed and printed   for free at our web site:[email or web address].       COPIES of completed Health Care Directives can be brought or mailed to any of our   locations, including the address listed below. You can also email a copy to [email or web address] .       For additional assistance or questions you can email us at [email or web address] or call  [phone]    Sincerely,     [facility] Advance Care Planning  [facility] and [place]  [address] [place] [id number]   Email us: [email or web address] Call us: [phone]  Visit at: [email or web address]

## 2021-06-20 NOTE — LETTER
Letter by Mayra De Santiago RN at      Author: Mayar De Santiago RN Service: -- Author Type: --    Filed:  Encounter Date: 5/16/2020 Status: (Other)       5/16/2020        Campos Ocampo  [address]  [place] [address]    This letter provides a written record that you were tested for COVID-19 on 5/14/2020.     Your result was negative.    This means that we didnt find the virus that causes COVID-19 in your sample. A test may show negative when you do actually have the virus. This can happen when the virus is in the early stages of infection, before you feel illness symptoms.    Even if you dont have symptoms, they may still appear. For safety, its very important to follow these rules.    Keep yourself away from others (self-isolation):      Stay home. Dont go to work, school or anywhere else.     Stay in your own room (and use your own bathroom), if you can.    Stay away from others in your home. No hugging, kissing or shaking hands. No visitors.    Clean high touch surfaces often (doorknobs, counters, handles, etc.). Use a household cleaning spray or wipes.    Cover your mouth and nose with a mask, tissue or washcloth to avoid spreading germs.    Wash your hands and face often with soap and water.    Stay in self-isolation until you meet ALL of the guidelines below:    1. You have had no fever for at least 72 hours (that is 3 full days of no fever without the use of medicine that reduces fevers), AND  2. other symptoms (such as cough, shortness of breath) have gotten better, AND  3. at least 10 days have passed since your symptoms first appeared.    Going back to work  Check with your employer for any guidelines to follow for going back to work.    Employers: This document serves as formal notice that your employee tested negative for COVID-19, as of the testing date shown above.    For questions regarding this letter or your Negative COVID-19 result, call [phone] between 8A to 6:30P  (M-F) and 10A to 6:30P (weekends).

## 2021-06-20 NOTE — PROGRESS NOTES
ASSESSMENT/ PLAN    1. Essential hypertension  Blood pressure well controlled.  Advised patient to give losartan 50 mg another few days and if his blood pressure remains in the low 110's then he can decrease losartan to 25 mg daily. Script for losartan 25 mg daily sent to his pharmacy.   - losartan (COZAAR) 25 MG tablet; Take 1 tablet (25 mg total) by mouth daily.  Dispense: 90 tablet; Refill: 0    2. SOB (shortness of breath)  Ongoing for many months. Previous work up at [facility] normal with negative exercise stress test. I have ordered NM stress test, which is scheduled for next week.  I review previous records from [facility] and apparently his cough resolved with prednisone and albuterol inhaler so if this stress test is negative again I will have him do a PFT.  He has no smoking history          This note was created using Dragon dictation software, spelling errors may occur.     Kimmy He MD        SUBJECTIVE   Campos MERCEDES Terrence is a 85 y.o. old male with a past medical history of hypertension, hypothyroidism, diabetes type 2, BPH, history of kidney stones who presented to clinic today for further evaluation of hypertension as well as shortness of breath on exertion.  Patient came to me to establish care on 9/6/2018 and for further workup of dyspnea on exertion.  Since then I have order a pharmacological stress test and this will happen next week.  He tells me that his shortness of breath has been persistent but the cough has gone away.  He also has continue atenolol completely and switch from losartan HCTZ to only losartan 50 mg daily for his hypertension.  Blood pressure at home has been in the low 110's.  Has been feeling well.    Review of Systems:  Negative except as noted in HPI     The following portions of the patient's history were reviewed and updated as appropriate: past medical history, past surgical history, family history, allergies, current medications and problem list.    Medical  History  Active Ambulatory (Non-Hospital) Problems    Diagnosis     History of kidney stones - s/p right lithotripsy and cystoscopy with urology 8/10/2017     Hyperlipidemia LDL goal <100     BPH (benign prostatic hyperplasia)     Essential hypertension     Type 2 diabetes mellitus without complication (H)     Erectile dysfunction, unspecified erectile dysfunction type     Environmental allergies     Hypothyroidism     Past Medical History:   Diagnosis Date     Ptosis, left eyelid        Surgical History  He  has a past surgical history that includes Cholecystectomy; eyelid surgery (Left, 04/2018); and Kidney stone surgery (2018).    Social History  Reviewed, and he  reports that he has never smoked. He has never used smokeless tobacco. He reports that he drinks about 0.6 oz of alcohol per week  He reports that he does not use illicit drugs.    Family History  Reviewed, and family history includes Emphysema in his father; Stroke in his mother.    Medications  Reviewed and reconciled    Allergies  Allergies   Allergen Reactions     Penicillins Unknown         OBJECTIVE  Physical Exam:  Vital signs: /71 (Patient Site: Left Arm, Patient Position: Sitting, Cuff Size: Adult Large)  Pulse 74  Resp 16  Wt 191 lb 9.6 oz (86.9 kg)  SpO2 96%  BMI 26.35 kg/m2  Weight: 191 lb 9.6 oz (86.9 kg)    General appearance: pleasant, appears stated age, cooperative and in no distress  Eyes: EOMs intact, PERRL, conjunctivae normal.   ENT: moist oral mucosa, posterior oropharynx normal. Thyroid normal to palpation, no lump or mass or tenderness  Lymph: no cervical/supraclavicular adenopathy  Respiratory: clear to auscultation bilaterally, good air movement throughout, no wheezing or crackles, speaking full sentences without difficulty  Cardiovascular: regular rate and rhythm, no murmur appreciated, no leg edema  Skin: no rashes  Neuro: alert oriented x 3, grossly normal otherwise  Psych: normal affect, appropriate conversation

## 2021-06-21 NOTE — PROGRESS NOTES
RESPIRATORY CARE NOTE     Patient Name: Campos Ocampo  Today's Date: 10/23/2018     Pre & Post Spirometry with Lung Volumes done. Pt performed tests with good effort, 2.5 mg Albuterol neb given. Test results meet ATS criteria, patient unable to perform DLCO. Results scanned into epic. Pt left in no distress.       Zhane Reddy

## 2021-06-21 NOTE — PROGRESS NOTES
ASSESSMENT/ PLAN    1. Low hemoglobin  hgb 13.1. MCV normal. Unclear cause. Will do further work up.   - Ferritin  - Morphology,Smear Review (MORP)  - Electrophoresis, Protein, Serum  - Sedimentation Rate  - C-Reactive Protein(CRP)    2. Hypothyroidism  3. Hypersomnia  Normal TSH 1.8 range when last checked 9/2018. Patient complains of ongoing fatigue, hypersomnia - which could be due to shortness of breath and asthma. Will increase from 150 mcg to 175 mcg daily to aim for TSH 2.5 or so. F/u in 4 weeks  - levothyroxine (SYNTHROID, LEVOTHROID) 175 MCG tablet; TAKE 1 TABLET BY MOUTH EVERY MORNING AT 6 AM  Dispense: 90 tablet; Refill: 0      4. Severe persistent asthma without complication  Per pft. Ongoing shortness of breath on exertion. Stress test nuc normal. Will start steroid max dose. rtc in 4 weeks for follow up and based on symptoms will decrease dose.   - mometasone (ASMANEX TWISTHALER) 220 mcg (60 doses) inhaler; Inhale 2 puffs 2 times a day at 6:00 am and 4:00 pm.  Dispense: 1 each; Refill: 1    5. Essential hypertension   systolic. Has been on losartan 50 mg daily. Will decrease to 25 mg daily. F/u in 4 weeks.     This note was created using Dragon dictation software, spelling errors may occur.     Kimmy He MD        SUBJECTIVE   Campos Ocampo is a 85 y.o. old male with a past medical history of type 2 diabetes, hyperlipidemia, hypo-thyroidism, BPH who presented to clinic today for further evaluation of follow-up of pulmonary function test.  He continued to complain of fatigue, hypersomnia and ongoing shortness of breath.      Review of Systems:  Negative except as noted in HPI      The following portions of the patient's history were reviewed and updated as appropriate: past medical history, past surgical history, family history, allergies, current medications and problem list.    Medical History  Active Ambulatory (Non-Hospital) Problems    Diagnosis     History of kidney stones - s/p  right lithotripsy and cystoscopy with urology 8/10/2017     Hyperlipidemia LDL goal <100     BPH (benign prostatic hyperplasia)     Essential hypertension     Type 2 diabetes mellitus without complication (H)     Erectile dysfunction, unspecified erectile dysfunction type     Environmental allergies     Hypothyroidism     Past Medical History:   Diagnosis Date     Ptosis, left eyelid        Surgical History  He  has a past surgical history that includes Cholecystectomy; eyelid surgery (Left, 04/2018); and Kidney stone surgery (2018).    Social History  Reviewed, and he  reports that he has never smoked. He has never used smokeless tobacco. He reports that he drinks about 0.6 oz of alcohol per week  He reports that he does not use illicit drugs.    Family History  Reviewed, and family history includes Emphysema in his father; Stroke in his mother.    Medications  Reviewed and reconciled    Allergies  Allergies   Allergen Reactions     Penicillins Unknown         OBJECTIVE  Physical Exam:  Vital signs: /58 (Patient Site: Left Arm, Patient Position: Sitting, Cuff Size: Adult Large)  Pulse 68  Resp 16  Wt 190 lb 3.2 oz (86.3 kg)  SpO2 96%  BMI 26.16 kg/m2  Weight: 190 lb 3.2 oz (86.3 kg)    General appearance: pleasant, appears stated age, cooperative and in no distress  Eyes: EOMs intact, PERRL, conjunctivae normal.   ENT: moist oral mucosa, posterior oropharynx normal. Thyroid normal to palpation, no lump or mass or tenderness  Lymph: no cervical/supraclavicular adenopathy  Respiratory: clear to auscultation bilaterally, good air movement throughout, no wheezing or crackles, speaking full sentences without difficulty  Cardiovascular: regular rate and rhythm, no murmur appreciated, no leg edema  Skin: no rashes  Neuro: alert oriented x 3, grossly normal otherwise  Psych: normal affect, appropriate conversation

## 2021-06-22 NOTE — PROGRESS NOTES
Pt was seen by Ashley 12/26 CV ordered no device  Pt will be traveling until 2/2  Pt will call me on return to set up and will get set up for new pre op with PMD  Pt has my direct number for contact and agrees to plan

## 2021-06-22 NOTE — PROGRESS NOTES
ASSESSMENT/ PLAN    1. Severe persistent asthma without complication  PFT with severe obstruction and reversibility. No h/o smoking. He couldn't do complete PFT per pulmonary tech's note. He doesn't want to do any more testing. He has shortness of breath with one flight of stairs. I gave him inhaled corticosteroids but not affordable for him. I then trial Symbicort and patient took for 2 weeks and this made him lose his voice and shortness of breath not any better. Will send him to pulmonary for further evaluation.   - Ambulatory referral to Pulmonology    2. Essential hypertension  Well controlled now. Used to be on 3 different antihypertensives when I first met him, now only on losartan 25 mg daily. Continue for now without change.     3. Low hemoglobin  4. Thrombocytopenia (H)  Recently seen by hematology. Work up for anemia unremarkable. Continue to monitor and f/u with hematology prn.       This note was created using Dragon dictation software, spelling errors may occur.     Kimmy He MD        SUBJECTIVE   Campos Ocampo is a 85 y.o. old male with a past medical history of hypertension, hyperlipidemia who presented to clinic today for further evaluation of follow-up of shortness of breath on exertion, hypertension and antihypertensive adjustment.  He has had a history of low hemoglobin and thrombocytopenia and was referred by me to go see hematology.  He saw hematology yesterday 11/29/2018 and was able to review this visit.  Anemia workup is being done.  Continues to monitor at this time without further workup for anemia workup is normal.  So I started him on long-acting beta agonist and inhaled corticosteroid for his shortness of breath on exertion with a PFT showing severe reversible obstructive lung condition most likely asthma.  Today he tells me that he stopped taking symbicort today because it made his voice hoarse and his shortness of breath is not any better. He's frustrated with the  difficulty with getting his medication refill, long wait time on the phone to get his medication filled and frustration with dyspnea on exertion. He has gotten in touch with patient advocate.     Review of Systems:  Negative except as noted in HPI      The following portions of the patient's history were reviewed and updated as appropriate: past medical history, past surgical history, family history, allergies, current medications and problem list.    Medical History  Active Ambulatory (Non-Hospital) Problems    Diagnosis     Thrombocytopenia (H)     Low hemoglobin     Hypersomnia     Severe persistent asthma without complication     History of kidney stones - s/p right lithotripsy and cystoscopy with urology 8/10/2017     Hyperlipidemia LDL goal <100     BPH (benign prostatic hyperplasia)     Essential hypertension     Type 2 diabetes mellitus without complication (H)     Erectile dysfunction, unspecified erectile dysfunction type     Environmental allergies     Hypothyroidism     Past Medical History:   Diagnosis Date     Diabetes mellitus (H)      Hypertension      Ptosis, left eyelid      Shingles        Surgical History  He  has a past surgical history that includes Cholecystectomy; eyelid surgery (Left, 04/2018); and Kidney stone surgery (2018).    Social History  Reviewed, and he  reports that  has never smoked. he has never used smokeless tobacco. He reports that he drinks about 0.6 oz of alcohol per week. He reports that he does not use drugs.    Family History  Reviewed, and family history includes Emphysema in his father; Stroke in his mother.    Medications  Reviewed and reconciled    Allergies  Allergies   Allergen Reactions     Penicillins Unknown         OBJECTIVE  Physical Exam:  Vital signs: /71 (Patient Site: Left Arm, Patient Position: Sitting, Cuff Size: Adult Regular)   Pulse 78   Temp (!) 95.9  F (35.5  C) (Oral)   Resp 16   Wt 194 lb 3.2 oz (88.1 kg)   BMI 26.71 kg/m    Weight: 194  lb 3.2 oz (88.1 kg)    General appearance: pleasant, appears stated age, cooperative and in no distress  Neuro: alert oriented x 3, grossly normal otherwise  Psych: normal affect, appropriate conversation

## 2021-06-22 NOTE — CONSULTS
[facility] Hematology and Oncology Consult Note    Patient: Campos Ocampo  MRN: [id number]  Date of Service: 11/29/2018        Reason for Visit    I was consulted by Dr. He regarding anemia and thrombocytopenia    Assessment/Plan    Mild anemia and thrombocytopenia  Dyspnea on exertion    Patient noted with very mild anemia and thrombocytopenia.  Ferritin is normal.  Patient denies bleeding and change with diet.  Will do anemia workup today.  Will contact patient with the results.  If these test results are normal I would recommend just observation at this time.  If he develops progressive anemia with hemoglobin less than 11 or platelet count less than 100,000 then we can see for further evaluation such as bone marrow aspirate and biopsy.    Dyspnea on exertion is unexplained.  It is not due to this very mild anemia.  Patient does have follow-up with his primary physician tomorrow.    Plan: Check reticulocyte count, B12 and folate  Check urinalysis  We will contact patient with results        ECOG Performance   ECOG Performance Status: 1  Distress Assessment  Distress Assessment Score: No distress        Problem List    1. Low hemoglobin  Reticulocytes    Vitamin B12    Folate, Serum    Urinalysis   2. Thrombocytopenia (H)             CC: Kimmy He MD      ______________________________________________________________________________      Staging History    Cancer Staging  No matching staging information was found for the patient.    History    Mr. Campos Ocampo is an 85-year-old with past history of hypothyroidism, hypertension, type 2 diabetes and hyperlipidemia who is referred for evaluation of mild anemia and thrombocytopenia.  He has recently noted some dyspnea on exertion.  Had stress testing which was negative.  Started on Symbicort inhaler without much improvement.  Has noted a change in his voice with the inhaler.  Reports good appetite, stable weight and no change in his diet.  Denies  change with bowels or urine.  Denies blood in the stool or urine.  Denies dark stool or urine.    No headaches dizziness or focal weakness.  No new abdominal or bone pain.  No change with bowels or urine.    Past surgical history includes cholecystectomy and kidney stone removal.  He is  lives in an apartment and has 4 kids.  He is a retired .    He is a never smoker.  Drinks wine weekly.        Past History  Past Medical History:   Diagnosis Date     Diabetes mellitus (H)      Hypertension      Ptosis, left eyelid      Shingles      Past Surgical History:   Procedure Laterality Date     CHOLECYSTECTOMY       eyelid surgery Left 04/2018     KIDNEY STONE SURGERY  2018     Family History   Problem Relation Age of Onset     Stroke Mother      Emphysema Father      Social History     Socioeconomic History     Marital status:      Spouse name: None     Number of children: 4     Years of education: None     Highest education level: None   Social Needs     Financial resource strain: None     Food insecurity - worry: None     Food insecurity - inability: None     Transportation needs - medical: None     Transportation needs - non-medical: None   Occupational History     None   Tobacco Use     Smoking status: Never Smoker     Smokeless tobacco: Never Used   Substance and Sexual Activity     Alcohol use: Yes     Alcohol/week: 0.6 oz     Types: 1 Glasses of wine per week     Drug use: No     Sexual activity: No   Other Topics Concern     None   Social History Narrative    Has 4 sons. Has 7 grandchildren. Has 1 great grandchild.     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     Has been  63 years in 2018.     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      Allergies    Allergies   Allergen Reactions     Penicillins Unknown       Review of Systems    12  "point review of systems completed.  Details per the HPI.  Other systemic review negative.       Pain  Currently in Pain: No/denies    Physical Exam    Recent Vitals 11/29/2018   Height 5' 11.5\"   Weight 193 lbs 8 oz   BSA (m2) 2.1 m2   /62   Pulse 90   Temp 97.9   Temp src 1   SpO2 97       GENERAL: Alert and oriented. Seated comfortably. In no distress.    HEAD: Atraumatic and normocephalic.  Has a full head of hair.    EYES: JEREMY, EOMI.  No pallor.  No icterus.    Oral cavity: no mucosal lesion or tonsillar enlargement.    NECK: supple. JVP normal.  No thyroid enlargement.    LYMPH NODES: No palpable, cervical, axillary or inguinal lymphadenopathy.    CHEST: clear to auscultation bilaterally.  Resonant to percussion throughout bilaterally.  Symmetrical breath movements bilaterally.    CVS: S1 and S2 are heard. Regular rate and rhythm.  No murmur or gallop or rub heard.    ABDOMEN: Soft. Not tender. Not distended.  No palpable hepatomegaly or splenomegaly.  No other mass palpable.  Bowel sounds heard.    EXTREMITIES: Warm.  No peripheral edema.    SKIN: no rash, or bruising or purpura.    CNS: Nonfocal.      Lab Results    CBC shows white count 5.5, hemoglobin 12.9 platelets 134 MCV 99 with normal differential.  CMP is normal.  Ferritin, sed rate, CRP are normal.  Peripheral smear shows normocytic normochromic anemia.  Imaging Results    No results found.      Signed by: Heaven Mcdaniel MD  "

## 2021-06-22 NOTE — PROGRESS NOTES
Pulmonary Clinic Outpatient Consultation    Assessment and Plan:   85 male with history of HTN, DM, never smoker, presents for evaluation of chronic dyspnea on exertion. PFTs show severe obstruction with positive bronchodilator response and probably underestimated lung volumes as these were done with N2 washout. Unclear why he should have any obstruction as he is essentially a never smoker. No relevant occupational exposures. He did not improve with trial of ICS/LABA.    Today he appears to be in atrial fibrillation/atrial flutter with RVR based on EKG done in clinic. He is not having palpitations or chest pain, but he does have new leg swelling and I am concerned he is in heart failure. As far as I can tell this is a new diagnosis as he had a normal EKG this past July. Given new diagnosis of afib and his complaints of dyspnea and leg swelling, I think it would be safest to have him evaluated in the emergency room to slow down the heart rate and perform further workup.    Recommendations:  - He agrees to go to the ED but says he doesn't want admission. Verbal report given to ED physician.  - would check troponins, BNP  - HR control with beta blocker vs. Calcium channel blocker, defer to ED team  - consider low-dose diuretic for edema and possible CHF  - consider LE dopplers to look for DVT and/or PE scan if any evidence of hypoxemia  - will need cardiology follow up next week, I have placed referral  - follow up with me to discuss next steps in evaluating obstructive lung disease, possibly CT chest vs. Trial of LAMA/LABA for symptom improvement. I gave him my card and we will contact him to see how he's doing next week.    All questions answered. He and his wife agree with the plan.      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 He has had worsening shortness of breath with exertion especially with long walks and climbing 1 flight of stairs. No chest pain, chest pressure, palpitations, syncope. He is a never smoker. No history of blood clots. No known history of atrial fibrillation.  Retired PhD from the University, used to work in the medical school.  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  No  hx.  No asbestos exposure.  He did have severe coughing back in the spring, no pneumonia, improved with prednisone. He tried symbicort for 3 weeks, no improvement, made him lose his voice so he stopped it.    ROS:  A 12-system review was obtained and was negative with the exception of the symptoms endorsed in the history of present illness.    PMH:  Past Medical History:   Diagnosis Date     Diabetes mellitus (H)      Hypertension      Ptosis, left eyelid      Shingles        PSH:  Past Surgical History:   Procedure Laterality Date     CHOLECYSTECTOMY       eyelid surgery Left 04/2018     KIDNEY STONE SURGERY  2018       Allergies:  Allergies   Allergen Reactions     Penicillins Unknown       Family HX:  Family History   Problem Relation Age of Onset     Stroke Mother      Emphysema Father        Social Hx:  Social History     Socioeconomic History     Marital status:      Spouse name: Not on file     Number of children: 4     Years of education: Not on file     Highest education level: Not on file   Social Needs     Financial resource strain: Not on file     Food insecurity - worry: Not on file     Food insecurity - inability: Not on file     Transportation needs - medical: Not on file     Transportation needs - non-medical: Not on file   Occupational History     Not on file   Tobacco Use     Smoking status: Never Smoker     Smokeless tobacco: Never Used   Substance and Sexual Activity     Alcohol use: Yes     Alcohol/week: 0.6 oz     Types: 1 Glasses  "of wine per week     Drug use: No     Sexual activity: No   Other Topics Concern     Not on file   Social History Narrative    Has 4 sons. Has 7 grandchildren. Has 1 great grandchild.     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     Has been  63 years in 2018.     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        Current Meds:  Current Outpatient Medications   Medication Sig Dispense Refill     aspirin-calcium carbonate 81 mg-300 mg calcium(777 mg) Tab Take 81 mg by mouth.       levothyroxine (SYNTHROID, LEVOTHROID) 175 MCG tablet TAKE 1 TABLET BY MOUTH EVERY MORNING AT 6 AM 90 tablet 0     losartan (COZAAR) 25 MG tablet Take 1 tablet (25 mg total) by mouth daily. 90 tablet 0     metFORMIN (GLUCOPHAGE) 500 MG tablet Take 1 tablet (500 mg total) by mouth 2 (two) times a day with meals. 180 tablet 1     multivitamin (ONE A DAY) per tablet Take 1 tablet by mouth.       niacin 500 MG tablet Take 500 mg by mouth.       No current facility-administered medications for this visit.        Physical Exam:  /82   Pulse 87   Resp 12   Ht 5' 11.5\" (1.816 m)   Wt 190 lb (86.2 kg)   SpO2 97%   BMI 26.13 kg/m    Gen: awake, alert, oriented, no distress  HEENT: nasal turbinates are unremarkable, no oropharyngeal lesions, no cervical or supraclavicular lymphadenopathy  CV: irreg irreg, tachy to 110s,, no M/G/R  Resp: CTAB, no focal crackles or wheezes  Abd: soft, nontender, no palpable organomegaly  Skin: no apparent rashes  Ext: 1+ edema to 1/2 way up the shins bilaterally.   Neuro: alert, nonfocal    Labs:  Reviewed  TSH 1.5 Sept 2018  Chem panel normal  hgb 12.9  13% monocytes      Imaging studies:  None available    PFT's  Oct 2018  FEV1/FVC is 52% and is reduced.  FEV1 is 1.27 L (45% predicted) and is reduced.  FVC is 2.46 L (63% predicted) and reduced.  There was improvement in " spirometry after a single inhaled dose of bronchodilator.  TLC is 7.00 L (96%% predicted) and is normal.     Impression:  Full Pulmonary Function Test is abnormal  Spirometry is consistent with severe obstructive ventilatory defect.  Spirometry is consistent with reversibility.  There is no hyperinflation.  There is air-trapping.    Echo (external report) from July 2018:  Stress echo: negative for ischemia  Grade 1 diastolic dysfunction. Normal EF 60-65%    NM pharm stress Oct 2018    The pharmacologic nuclear stress test is negative for inducible myocardial ischemia or infarction.    The left ventricular ejection fraction is 76%.    There is no prior study available.      Wesly Kaiser MD (Avi)  [facility]  Pager [phone]  Clinic [phone]

## 2021-06-23 NOTE — PROGRESS NOTES
Preoperative Exam    Scheduled Procedure: Afib  Surgery Date:  2/11/19  Surgery Location: [facility], fax [phone]    Surgeon:  Ashley [facility]    Assessment/Plan:     Campos was seen today for pre-op exam.    Encounter for preoperative examination for general surgical procedure  Persistent atrial fibrillation (H)  No chest pain on exertion. Has chronic shortness of breath with stairs but otherwise no shortness of breath with level ground walking. On anticoagulation appropriately. Rate controlled today. No anesthesia complication per history. No h/o bleeding/clotting disorder. Optimized for procedure. No further cardiac/pulmonary testing indicated. Lab obtained as below.   -     Thyroid Stimulating Hormone (TSH)  -     Comprehensive Metabolic Panel  -     HM2(CBC w/o Differential)    Severe chronic obstructive pulmonary disease (H)  Per PFT 10/23/2018. This has taken the back burner as he's being treated for atrial fibrillation. He was in afib the day he was in the pulmonology office for abnormal PFT. He knows to schedule with pulmonology for follow up of abnormal PFT. He does have chronic shortness of breath with 1 flight of stairs but no shortness of breath with level ground walking and he does walk a lot on a daily basis. This should not hold up his cardioversion.     Hypothyroidism, unspecified type  On medication. Needs thyroid recheck.  -     Thyroid Stimulating Hormone (TSH)    Type 2 diabetes mellitus without complication, without long-term current use of insulin (H)  Stable. Last a1c about 5-6 months ago and was 6.7. Hold metformin the morning of his procedure.   -     Glycosylated Hemoglobin A1c    Thrombocytopenia (H)  Low hemoglobin  Has seen hematology 11/29/2018. Has mild anemia and thrombocytopenia. Continue to observe. If Hgb <11 or platelet <100,000 needs to go back to hematology for possible bone marrow study.   -     HM2(CBC w/o Differential)    Essential  hypertension  Hold losartan the morning of his procedure. Otherwise BP well controlled.         -     Comprehensive Metabolic Panel    Surgical Procedure Risk: Low (reported cardiac risk generally < 1%)  Have you had prior anesthesia?: Yes  Have you or any family members had a previous anesthesia reaction:  No  Do you or any family members have a history of a clotting or bleeding disorder?: No  Cardiac Risk Assessment: no increased risk for major cardiac complications    Patient approved for surgery with general or local anesthesia.      Functional Status: Independent  Patient plans to recover at home with family.     Subjective:      Campos Ocampo is a 85 y.o. male who presents for a preoperative consultation.  He will be undergoing cardioversion for persistent atrial fibrillation scheduled for 2/11/2019.  He has no questions or concerns today.  He is on Xarelto.  He is complaining of diarrhea and diarrhea ever since he started on diltiazem and Xarelto.  He just recently returned from [place] for vacation.  Denies any fever, chills, nausea, vomiting, abdominal pain, and any urinary symptoms.  Denies chest pain.  He reports chronic shortness of breath when he goes up a flight of stairs but with regular walking on level ground, he denies any shortness of breath.    All other systems reviewed and are negative, other than those listed in the HPI.    Pertinent History  Do you have difficulty breathing or chest pain after walking up a flight of stairs: Yes: no pain but breething yes   History of obstructive sleep apnea: No  Steroid use in the last 6 months: No  Frequent Aspirin/NSAID use: No  Prior Blood Transfusion: No  Prior Blood Transfusion Reaction: No  If for some reason prior to, during or after the procedure, if it is medically indicated, would you be willing to have a blood transfusion?:  There is no transfusion refusal.    Current Outpatient Medications   Medication Sig Dispense Refill     diltiazem  (CARDIZEM CD) 120 MG 24 hr capsule Take 1 capsule (120 mg total) by mouth daily. 30 capsule 11     levothyroxine (SYNTHROID, LEVOTHROID) 175 MCG tablet TAKE 1 TABLET BY MOUTH EVERY MORNING AT 6 AM 90 tablet 0     losartan (COZAAR) 25 MG tablet Take 1 tablet (25 mg total) by mouth daily. 90 tablet 3     metFORMIN (GLUCOPHAGE) 500 MG tablet Take 1 tablet (500 mg total) by mouth 2 (two) times a day with meals. 180 tablet 1     niacin 500 MG tablet Take 500 mg by mouth.       rivaroxaban 20 mg Tab Take 1 tablet (20 mg total) by mouth daily with supper. 90 tablet 3     No current facility-administered medications for this visit.         Allergies   Allergen Reactions     Penicillins Unknown       Patient Active Problem List   Diagnosis     Erectile dysfunction, unspecified erectile dysfunction type     Essential hypertension     Hyperlipidemia LDL goal <100     Hypothyroidism     Type 2 diabetes mellitus without complication (H)     Environmental allergies     BPH (benign prostatic hyperplasia)     History of kidney stones - s/p right lithotripsy and cystoscopy with urology 8/10/2017     Low hemoglobin     Hypersomnia     Severe persistent asthma without complication     Thrombocytopenia (H)     Persistent atrial fibrillation (H)     VILLEGAS (dyspnea on exertion)       Past Medical History:   Diagnosis Date     Atrial fibrillation (H)      Diabetes mellitus (H)      Hypertension      Ptosis, left eyelid      Shingles        Past Surgical History:   Procedure Laterality Date     CHOLECYSTECTOMY       eyelid surgery Left 04/2018     KIDNEY STONE SURGERY  2018       Social History     Socioeconomic History     Marital status:      Spouse name: Not on file     Number of children: 4     Years of education: Not on file     Highest education level: Not on file   Social Needs     Financial resource strain: Not on file     Food insecurity - worry: Not on file     Food insecurity - inability: Not on file     Transportation  "needs - medical: Not on file     Transportation needs - non-medical: Not on file   Occupational History     Not on file   Tobacco Use     Smoking status: Never Smoker     Smokeless tobacco: Never Used   Substance and Sexual Activity     Alcohol use: Yes     Alcohol/week: 0.6 oz     Types: 1 Glasses of wine per week     Drug use: No     Sexual activity: No   Other Topics Concern     Not on file   Social History Narrative    Has 4 sons. Has 7 grandchildren. Has 1 great grandchild.     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     Has been  63 years in 2018.     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        Patient Care Team:  Kimmy He MD as PCP - General (Family Medicine)  Heaven Mcdaniel MD as Physician (Hematology and Oncology)          Objective:     Vitals:    02/07/19 1218   BP: 121/67   Pulse: 75   Temp: 96.8  F (36  C)   TempSrc: Oral   Weight: 193 lb 6.4 oz (87.7 kg)   Height: 5' 11.5\" (1.816 m)         Physical Exam:  General Appearance: Alert, cooperative, no distress, appears stated age, obese  Head: Normocephalic, without obvious abnormality, atraumatic  Eyes: PERRL, conjunctiva/corneas clear, EOM's intact  Ears: Normal TM's and external ear canals, both ears  Nose: Nares normal, no drainage  Throat: Lips, mucosa, and tongue normal; teeth and gums normal  Neck: Supple, symmetrical, trachea midline, no adenopathy;  thyroid: not enlarged, symmetric, no tenderness/mass/nodules.  Back: Symmetric, no curvature, ROM normal  Lungs: Clear to auscultation bilaterally, respirations unlabored  Heart: Irregularly irregular rhythm, S1 and S2 normal, no murmur rub or gallop  Abdomen: Soft, non-tender, bowel sounds active all four quadrants,  no masses, no organomegaly  Genitourinary: Not performed  Musculoskeletal: Normal range of motion.    Extremities: " Extremities normal, atraumatic, no cyanosis,   Skin: Limited skin examination is unremarkable  Lymph nodes: Cervical, supraclavicular, and axillary nodes normal  Neurologic: alert, has normal reflexes.  answers questions appropriately, sensation intact throughout.   Psychiatric: normal mood and affect.       Patient Instructions   Hold metformin and losartan in the morning of cardioversion and then resume that the day after.   Hold all supplements, aspirin and NSAIDs for 7 days prior to surgery.  Follow your surgeon's direction on when to stop eating and drinking prior to surgery.  Your surgeon will be managing your pain after your surgery.        EKG:  Not indicated. He has had many EKGs recently. In Atrial fibrillation    Labs:  Recent Results (from the past 120 hour(s))   Thyroid Stimulating Hormone (TSH)    Collection Time: 02/07/19  1:00 PM   Result Value Ref Range    TSH 0.75 0.30 - 5.00 uIU/mL   Comprehensive Metabolic Panel    Collection Time: 02/07/19  1:00 PM   Result Value Ref Range    Sodium 141 136 - 145 mmol/L    Potassium 5.5 (H) 3.5 - 5.0 mmol/L    Chloride 104 98 - 107 mmol/L    CO2 29 22 - 31 mmol/L    Anion Gap, Calculation 8 5 - 18 mmol/L    Glucose 98 70 - 125 mg/dL    BUN 19 8 - 28 mg/dL    Creatinine 1.10 0.70 - 1.30 mg/dL    GFR MDRD Af Amer >60 >60 mL/min/1.73m2    GFR MDRD Non Af Amer >60 >60 mL/min/1.73m2    Bilirubin, Total 0.8 0.0 - 1.0 mg/dL    Calcium 9.8 8.5 - 10.5 mg/dL    Protein, Total 6.7 6.0 - 8.0 g/dL    Albumin 3.6 3.5 - 5.0 g/dL    Alkaline Phosphatase 188 (H) 45 - 120 U/L    AST 41 (H) 0 - 40 U/L    ALT 46 (H) 0 - 45 U/L   HM2(CBC w/o Differential)    Collection Time: 02/07/19  1:00 PM   Result Value Ref Range    WBC 4.6 4.0 - 11.0 thou/uL    RBC 3.78 (L) 4.40 - 6.20 mill/uL    Hemoglobin 12.3 (L) 14.0 - 18.0 g/dL    Hematocrit 37.3 (L) 40.0 - 54.0 %    MCV 99 80 - 100 fL    MCH 32.5 27.0 - 34.0 pg    MCHC 33.0 32.0 - 36.0 g/dL    RDW 12.9 11.0 - 14.5 %    Platelets 112 (L)  140 - 440 thou/uL    MPV 9.0 7.0 - 10.0 fL   Glycosylated Hemoglobin A1c    Collection Time: 02/07/19  1:00 PM   Result Value Ref Range    Hemoglobin A1c 6.5 (H) 4.2 - 6.1 %       Immunization History   Administered Date(s) Administered     Hep A, Adult IM (19yr & older) 06/20/2001, 01/24/2002     Hep B, Adult 06/20/2001, 07/24/2001, 10/31/2001     IPV 06/20/2001     Influenza high dose, seasonal 09/12/2018     Influenza, inj, historic,unspecified 11/10/2004, 11/03/2005, 11/27/2006     Influenza,inj,MDCK,PF,Quad >4yrs 10/09/2017     Pneumo Conj 13-V (2010&after) 12/15/2000, 06/11/2015     Pneumo Polysac 23-V 12/15/2000, 12/12/2016     Td, Adult, Absorbed 10/02/2001     Tdap 11/01/2018           Electronically signed by Kimmy He MD 02/07/19 12:21 PM

## 2021-06-23 NOTE — ANESTHESIA POSTPROCEDURE EVALUATION
Patient: Campos Ocampo  * No procedures listed *  Anesthesia type: general    Patient location: [facility] Floor  Last vitals:   Vitals:    02/11/19 1400   BP: 127/70   Pulse: 84   Resp:    Temp:    SpO2:      Post vital signs: stable  Level of consciousness: awake and responds to simple questions  Post-anesthesia pain: pain controlled  Post-anesthesia nausea and vomiting: no  Pulmonary: unassisted, return to baseline  Cardiovascular: stable and blood pressure at baseline  Hydration: adequate  Anesthetic events: no    QCDR Measures:  ASA# 11 - Domenica-op Cardiac Arrest: ASA11B - Patient did NOT experience unanticipated cardiac arrest  ASA# 12 - Domenica-op Mortality Rate: ASA12B - Patient did NOT die  ASA# 13 - PACU Re-Intubation Rate: ASA13B - Patient did NOT require a new airway mgmt  ASA# 10 - Composite Anes Safety: ASA10A - No serious adverse event    Additional Notes:

## 2021-06-23 NOTE — TELEPHONE ENCOUNTER
Refill Approved    Rx renewed per Medication Renewal Policy. Medication was last renewed on 11/12/18.     Karen Raphael, [facility] Triage/Med Refill 2/6/2019     Requested Prescriptions   Pending Prescriptions Disp Refills     losartan (COZAAR) 25 MG tablet [Pharmacy Med Name: Losartan Potassium Oral Tablet 25 MG] 90 tablet 0     Sig: Take 1 tablet (25 mg total) by mouth daily.    Angiotensin Receptor Blocker Protocol Passed - 2/4/2019  9:07 AM       Passed - PCP or prescribing provider visit in past 12 months      Last office visit with prescriber/PCP: 11/30/2018 Kimmy He MD OR same dept: 11/30/2018 Kimmy He MD OR same specialty: 11/30/2018 Kimmy He MD  Last physical: Visit date not found Last MTM visit: Visit date not found   Next visit within 3 mo: Visit date not found  Next physical within 3 mo: Visit date not found  Prescriber OR PCP: Kimmy He MD  Last diagnosis associated with med order: 1. Essential hypertension  - losartan (COZAAR) 25 MG tablet [Pharmacy Med Name: Losartan Potassium Oral Tablet 25 MG]; Take 1 tablet (25 mg total) by mouth daily.  Dispense: 90 tablet; Refill: 0    If protocol passes may refill for 12 months if within 3 months of last provider visit (or a total of 15 months).            Passed - Serum potassium within the past 12 months    Lab Results   Component Value Date    Potassium 4.1 12/21/2018            Passed - Blood pressure filed in past 12 months    BP Readings from Last 1 Encounters:   12/26/18 108/62            Passed - Serum creatinine within the past 12 months    Creatinine   Date Value Ref Range Status   12/21/2018 0.85 0.70 - 1.30 mg/dL Final

## 2021-06-23 NOTE — PROGRESS NOTES
8/15/1933  Home:[phone] (home) Cell:[phone] (mobile)  Emergency Contact: Suzie Ocampo [phone]    +++Important patient information for CSC/Cath Lab staff : PT IS ON XARELTO,PT IS DIABETIC+++    [facility] EP Cath Lab Procedure Order   Cardioversion:    Cardioversion    PT IS ON XARELTO AND NO MISSED DOSES  REVIEWED WITH WALLACE GUNN CNP    Diagnosis:  AF  Anticipated Case Duration:  Standard  Scheduling Needs/Timeframe:  PT IS SET FOR MONDAY 2/11/2019 AT 12 WITH ASHLEY GUNN CNP    Current Device: None None  Device Company/Device Rep Needed for Procedure: None    Anesthesia:  General-CV Only  Research Protocol:  No    [facility] EP Cath Lab Prep   Ordering Provider: Ashley Gunn NP  Ordering Date: 12/26/2019  Orders Status: Intial order placed and Order set placed    H&P:  Compled by DR KELLEY HE PMD on 2/7/2019 if scheduled within 30 days, pt to schedule with PMD if procedure outside of this timeframe  PCP: Kimmy He MD, [phone]    Pre-op Labs: N/A for procedure    Medical Records Pertinent for Procedure:  N/A    Patient Education:    PT HAS A  FOR PROCEDURE AND THEY WILL STAY WITH PT AND BE PRESENT FOR DISCHARGE AND POST CV 24 HR MONITORING  PT INSTRUCTED TO HOLD ANY VITAMINS,CALCIUM,ITON OR SUPPLEMENTS THE MORNING OF CV   PT INSTRUCTED TO HOLD HIS METFORMIN AND LOSARTAN PER HIS PMD SEE NOTE 2/7/2019  PT INSTRUCTED TO BATHE OR SHOWER BEFORE COMING IN  PT INSTRUCTED TO LEAVE JEWELRY AT HOME  PT IS ON XARELTO AND NO MISSED DOSES,PT TAKES HIS DOSE IN THE MORNING AND WILL TAKE BEFORE COMING IN  PT HAD PRE OP WITH PMD IN [facility] 2/7/2019 DR KIMMY HE  PT IS TO HAVE A 3 WEEK FOLLOW UP WITH ASHLEY AND  HAS BEEN NOTIFIED    Teach with Patient: Completed via phone on 2/7/2019    Risks Reviewed:     Cardioversion    >90% acute success rate, <10% failure to convert or   reverts shortly after cardioversion.    <1% embolic event of (CVA, pulmonary embolism, or   other site).    75% risk for  superficial burn.  Risks associated with general anesthesia will be addressed by the Anesthesiology Department    Consent: Will be obtained in [facility] day of procedure    Pre-Procedure Instructions that were Reviewed with Patient:  NPO after midnight, Remove all jewelry prior to coming in for procedure, Shower prior to arrival, Transportation arrangements needed s/p procedure, Post-procedure follow up process and Sedation plan/orders    Pre-Procedure Medication Instructions:  Instructions given to pt regarding anticoagulants: Xarelto- instructed to continue anticoagulation uninterrupted through their procedure  Instructions given to pt regarding antiarrhythmic medication: None; N/A  Instructions for medication, other than anticoagulants/antiarrhythmics listed above, given to pt: to take all morning medications with small sips of water, with the exception of OTC supplements and MVI    Allergies   Allergen Reactions     Penicillins Unknown       Current Outpatient Medications:      diltiazem (CARDIZEM CD) 120 MG 24 hr capsule, Take 1 capsule (120 mg total) by mouth daily., Disp: 30 capsule, Rfl: 11     levothyroxine (SYNTHROID, LEVOTHROID) 175 MCG tablet, TAKE 1 TABLET BY MOUTH EVERY MORNING AT 6 AM, Disp: 90 tablet, Rfl: 0     losartan (COZAAR) 25 MG tablet, Take 1 tablet (25 mg total) by mouth daily., Disp: 90 tablet, Rfl: 3     metFORMIN (GLUCOPHAGE) 500 MG tablet, Take 1 tablet (500 mg total) by mouth 2 (two) times a day with meals., Disp: 180 tablet, Rfl: 1     niacin 500 MG tablet, Take 500 mg by mouth., Disp: , Rfl:      rivaroxaban 20 mg Tab, Take 1 tablet (20 mg total) by mouth daily with supper., Disp: 90 tablet, Rfl: 3    Documentation Date:2/7/2019 3:15 PM  Stan Hoff LPN

## 2021-06-23 NOTE — TELEPHONE ENCOUNTER
Please call pharmacy and ask what other substitute does pharmacist suggest? What is the reason why diltiazem cannot be filled.

## 2021-06-23 NOTE — ANESTHESIA PREPROCEDURE EVALUATION
Anesthesia Evaluation      Patient summary reviewed   No history of anesthetic complications     Airway   Mallampati: II  Neck ROM: full   Pulmonary - normal exam   (+) COPD,   (-) rhonchi, wheezes, rales, stridor                         Cardiovascular   Exercise tolerance: > or = 4 METS  (+) hypertension, dysrhythmias (afib), ,     (-) murmur  Rhythm: irregular  Rate: normal,    no murmur   ROS comment: 10/16/18: NM stress    The pharmacologic nuclear stress test is negative for inducible myocardial ischemia or infarction.    The left ventricular ejection fraction is 76%.    There is no prior study available.     Neuro/Psych - negative ROS     Endo/Other    (+) diabetes mellitus type 2 well controlled, hypothyroidism,      GI/Hepatic/Renal - negative ROS      Other findings: Labs 2/7/19:   Hgb 12.3, Plt 112  Na 141, K 5.5, BUN 19, Cr 1.10      Dental    (+) lower dentures and upper dentures                       Anesthesia Plan  Planned anesthetic: general mask and total IV anesthesia  GA mask TIVA.  Methohexital.  ASA 2   Induction: intravenous   Anesthetic plan and risks discussed with: patient    Post-op plan: routine recovery

## 2021-06-23 NOTE — PATIENT INSTRUCTIONS - HE
Hold metformin and losartan in the morning of cardioversion and then resume that the day after.   Hold all supplements, aspirin and NSAIDs for 7 days prior to surgery.  Follow your surgeon's direction on when to stop eating and drinking prior to surgery.  Your surgeon will be managing your pain after your surgery.

## 2021-06-23 NOTE — ANESTHESIA CARE TRANSFER NOTE
Last vitals:   Vitals:    02/11/19 1159   Pulse: 83   Resp: 16   Temp: 36.6  C (97.9  F)   SpO2: 95%     Patient's level of consciousness is drowsy  Spontaneous respirations: yes  Maintains airway independently: yes  Dentition unchanged: yes  Oropharynx: oropharynx clear of all foreign objects    QCDR Measures:  ASA# 20 - Surgical No Data Recorded  PQRS# 430 - Adult PONV Prevention: 4558F - Pt received => 2 anti-emetic agents (different classes) preop & intraop  ASA# 8 - Peds PONV Prevention: NA - Not pediatric patient, not GA or 2 or more risk factors NOT present  PQRS# 424 - [place]-op Temp Management: NA - MAC anesthesia or case < 60 minutes  PQRS# 426 - PACU Transfer Protocol: - Transfer of care checklist used  ASA# 14 - Acute Post-op Pain: ASA14B - Patient did NOT experience pain >= 7 out of 10

## 2021-06-24 NOTE — TELEPHONE ENCOUNTER
Clinic Care Coordination Contact    Clinic Care Coordination Contact  OUTREACH    Referral Information:  Referral Source: IP Report    Primary Diagnosis: Cardiovascular - other    Chief Complaint   Patient presents with     Clinic Care Coordination - Post Hospital     Dc'd from [facility] on 2/11/19       Clinical Concerns:  Current Medical Concerns:  Had a failed cardioversion yesterday. Is having another next Monday. No other concerns.     Current Behavioral Concerns: none identified.     Education Provided to patient: Reviewed role of care coordinator      Health Maintenance Reviewed: Due/Overdue       Living Situation:  Current living arrangement:: I live in a private home with spouse  Type of residence:: Private home - stairs         Financial/Insurance:   Financial/Insurance concerns (GOAL):: No  UCARE for seniors, no financial concerns.     [place]  Advance Care Plan/Directive  Advanced Care Plans/Directives on file:: No  Advanced Care Plan/Directive Status: Declined Further Information    Referrals Placed: None    Patient/Caregiver understanding: Patient and wife do not need any support from care coordinator at this time. Did not want to complete assessment.   He declined having letter with CC information mailed to him.      Plan: No further outreach by this CC.  Kenya Kendall,   [facility]  [email or web address]  [phone]

## 2021-06-24 NOTE — PROCEDURES
Procedures        Campos Ocampo is a very pleasant 85-year-old gentleman who was diagnosed with persistent atrial fibrillation with rapid ventricular response 12/21/2018, onset sometime after 10/16/2018.  Symptoms appear to consist of dyspnea on exertion and lower extremity edema.  He underwent cardioversion on 2/11/2019, but with early recurrence of atrial fibrillation.  As recent echo was normal as was nuclear stress test, he was started on flecainide 100 mg twice daily with diltiazem 120 mg daily.  He reports that his heart rates have been in the 60s and 70s, but he does not note any significant difference in how he is feeling.  He was instructed to pay close attention over the next couple of weeks to see if he feels any better with maintenance of sinus rhythm than he did in A. fib.  He has a [id number] score of 4 for age >75, hypertension, and diabetes.  He is on Xarelto 20 mg daily for stroke prophylaxis.  He reports taking this with his evening meal and denies missing any doses or having any bleeding issues.  Medications and instructions were reviewed with Campos.  Follow-up with me on 3/14/2019 as previously scheduled.

## 2021-06-24 NOTE — PROGRESS NOTES
Instructed on proper use of a spacer with Albuterol inhaler. Demonstrated back correctly . Has no questions at this time. Numbers given to call with any questions or concerns.

## 2021-06-24 NOTE — PATIENT INSTRUCTIONS - HE
- check alpha-1 antitrypsin blood test today    - non-contrast chest CT    - start: Spiriva daily & albuterol as needed (with spacer)    - return in 2-4 weeks with new spirometry to follow-up on above    - pulmonary rehab

## 2021-06-24 NOTE — TELEPHONE ENCOUNTER
Please help patient make appointment with pulmonary. He has seen them once but was sent to ER because of atrial fib so I don't think he needs another referral

## 2021-06-24 NOTE — PROGRESS NOTES
Discharge home.   Cancelled cardioversion.  Seen by Ashley.  No changes with medications.  Keep the appointment scheduled with Ashley for March 14.

## 2021-06-24 NOTE — PROGRESS NOTES
8/15/1933  Home:[phone] (home) Cell:[phone] (mobile)  Emergency Contact: Suzie Ocampo [phone]    +++Important patient information for CSC/Cath Lab staff : PT IS NEW ON FLECAINIDE, PT FAILLED CV 2/11,PT IS DIABETIC +++    [facility] EP Cath Lab Procedure Order     Cardioversion:  Cardioversion     PT IS ON XARELTO AND NO MISSED DOSES    Diagnosis:  AF  Anticipated Case Duration:  Standard  Scheduling Needs/Timeframe:  PT IS SET FOR MONDAY 2/18/2019 AT 12 WITH DONNA GUNN CNP    Current Device: None None  Device Company/Device Rep Needed for Procedure: None    Anesthesia:  General-CV Only  Research Protocol:  No    [facility] EP Cath Lab Prep   Ordering Provider: Donna Gunn NP  Ordering Date: 2/11/2019  Orders Status: Intial order placed and Order set placed    H&P:  Compled by PMD DR HE on 2/7/2019 if scheduled within 30 days, pt to schedule with PMD if procedure outside of this timeframe  PCP: Kimmy He MD, [phone]    Pre-op Labs: N/A for procedure    Medical Records Pertinent for Procedure:  N/A    Patient Education:    PT HAS A  FOR PROCEDURE THAT WILL STAY WITH PT AND BE PRESENT FOR DISCHARGE AND POST CV 24 HR MONITORING  PT INSTRUCTED TO HOLD ANY VITAMINS,MINERALS, CALCIUM, IRON OR SUPPLEMENTS THE MORNING OF CV  PT IS DIABETIC AND INSTRUCTED TO HOLD HIS METFORMIN  PT IS ON XARELTO AND NO MISSED DOSES  PT FAILED CV 2/11/2019 AND IS A NEW FLECAINIDE START 2/12/2019  PT INSTRUCTED TO LEAVE JEWELRY AT HOME  PT INSTRUCTED TO BATHE OR SHOWER BEFORE COMING IN  PT HAS A FOLLOW UP WITH DONNA 3/14      Teach with Patient: Completed via phone on 2/12/2019    Risks Reviewed:     Cardioversion    >90% acute success rate, <10% failure to convert or   reverts shortly after cardioversion.    <1% embolic event of (CVA, pulmonary embolism, or   other site).    75% risk for superficial burn.  Risks associated with general anesthesia will be addressed by the Anesthesiology Department    Consent: Will  be obtained in [facility] day of procedure    Pre-Procedure Instructions that were Reviewed with Patient:  NPO after midnight, Remove all jewelry prior to coming in for procedure, Shower prior to arrival, Transportation arrangements needed s/p procedure, Post-procedure follow up process and Sedation plan/orders    Pre-Procedure Medication Instructions:  Instructions given to pt regarding anticoagulants: Xarelto- instructed to continue anticoagulation uninterrupted through their procedure  Instructions given to pt regarding antiarrhythmic medication: Flecainide; Pt instructed to continue medication prior to procedure  Instructions for medication, other than anticoagulants/antiarrhythmics listed above, given to pt: to take all morning medications with small sips of water, with the exception of OTC supplements and MVI    Allergies   Allergen Reactions     Penicillins Unknown       Current Outpatient Medications:      cholecalciferol, vitamin D3, 1,000 unit tablet, Take 1,000 Units by mouth daily., Disp: , Rfl:      diltiazem (CARDIZEM CD) 120 MG 24 hr capsule, Take 1 capsule (120 mg total) by mouth daily., Disp: 90 capsule, Rfl: 3     flecainide (TAMBOCOR) 100 MG tablet, Take 1 tablet (100 mg total) by mouth 2 (two) times a day., Disp: 60 tablet, Rfl: 6     levothyroxine (SYNTHROID, LEVOTHROID) 175 MCG tablet, TAKE 1 TABLET BY MOUTH EVERY MORNING AT 6 AM, Disp: 90 tablet, Rfl: 0     losartan (COZAAR) 25 MG tablet, Take 1 tablet (25 mg total) by mouth daily., Disp: 90 tablet, Rfl: 3     metFORMIN (GLUCOPHAGE) 500 MG tablet, Take 1 tablet (500 mg total) by mouth 2 (two) times a day with meals., Disp: 180 tablet, Rfl: 1     multivitamin with minerals (THERA-M) 9 mg iron-400 mcg Tab tablet, Take 1 tablet by mouth daily., Disp: , Rfl:      niacin 500 MG tablet, Take 500 mg by mouth., Disp: , Rfl:      rivaroxaban 20 mg Tab, Take 1 tablet (20 mg total) by mouth daily with supper., Disp: 90 tablet, Rfl: 3  No current  facility-administered medications for this visit.     Documentation Date:2/12/2019 10:02 AM  Stan Hoff LPN

## 2021-06-24 NOTE — PROGRESS NOTES
Assessment and Plan:  85 male with history of HTN, DM, never smoker, presented to pulmonary clinic 12/21/2018 for evaluation of chronic dyspnea on exertion. PFTs show severe obstruction with positive bronchodilator response and probably underestimated lung volumes as these were done with N2 washout. Unclear why he should have any obstruction as he is essentially a never smoker. No relevant occupational exposures. He did not improve with trial of ICS/LABA. He appeared to be in atrial fibrillation/atrial flutter with RVR based on EKG done in clinic, and was sent to the ED for evaluation.  Atrial fibrillation was confirmed, an outpatient cardioversion was attempted however unsuccessful, and the patient is now rate controlled.  He reports however that his dyspnea on exertion continues unchanged.  He tried Symbicort before, but did not tolerate it.  His pulmonary function tests show severe COPD, which is surprising and that he has never smoked, and has had no occupational exposures nor secondhand smoke exposure aside from living with his father up until the age of 18 years old.     Recommendations:  -Check alpha-1 antitrypsin level   -Obtain noncontrast chest CT to evaluate for bullae or other abnormality which may contribute to COPD  -Pulmonary rehab ordered  -Start Spiriva and albuterol as needed.  Spacer with teaching provided in clinic today  -Return to pulmonary clinic in 2 weeks with repeat spirometry and DLCO      CCx: Dyspnea on exertion    HPI: The patient states that he continues to have dyspnea on exertion with walking up inclines, stairs, or long distances that is not significantly changed since he has started treatment for his atrial fibrillation.  He denies cough, fevers, chills, lower extremity swelling, or other symptoms he tried Symbicort, however he did not tolerate it.  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    ROS:  A review of 12 organ systems was performed with pertinent positives and negatives noted in the HPI.      Current Meds:  Current Outpatient Medications   Medication Sig Note     cholecalciferol, vitamin D3, 1,000 unit tablet Take 1,000 Units by mouth daily.      diltiazem (CARDIZEM CD) 120 MG 24 hr capsule Take 1 capsule (120 mg total) by mouth daily.      flecainide (TAMBOCOR) 100 MG tablet Take 1 tablet (100 mg total) by mouth 2 (two) times a day.      levothyroxine (SYNTHROID, LEVOTHROID) 175 MCG tablet TAKE 1 TABLET BY MOUTH EVERY MORNING AT 6 AM      losartan (COZAAR) 25 MG tablet Take 1 tablet (25 mg total) by mouth daily.      metFORMIN (GLUCOPHAGE) 500 MG tablet Take 1 tablet (500 mg total) by mouth 2 (two) times a day with meals.      multivitamin with minerals (THERA-M) 9 mg iron-400 mcg Tab tablet Take 1 tablet by mouth daily.      niacin 500 MG tablet Take 500 mg by mouth. 9/6/2018: Received from: [facility]  Received Sig: Take 500 mg by mouth once daily.       rivaroxaban 20 mg Tab Take 1 tablet (20 mg total) by mouth daily with supper. 2/18/2019: No missed doses in the last 3 weeks.  With a full meal     albuterol (PROAIR HFA;PROVENTIL HFA;VENTOLIN HFA) 90 mcg/actuation inhaler Inhale 2 puffs every 6 (six) hours as needed for wheezing or shortness of breath. Use with spacer      tiotropium (SPIRIVA) 18 mcg inhalation capsule Place 1 capsule (2 puffs total) into inhaler and inhale daily.        Labs:  No results found for this or any previous visit (from the past 72 hour(s)).    I have personally reviewed all pertinent imaging studies and PFT results unless otherwise noted.    Imaging studies:  No results found.      Physical Exam:  /58   Pulse 71   Resp 18   Wt 194 lb (88 kg)   SpO2 98% Comment: RA  BMI 26.31 kg/m    General - Well nourished  Ears/Mouth -  OP pink moist, no thrush  Neck - no cervical lymphadenopathy  Lungs - Clear to ausculation  bilaterally   CVS - regular rhythm with no murmurs, rubs or gallups  Abdomen - soft, NT, ND, NABS  Ext - no cyanosis, clubbing or edema  Skin - no rash  Psychology - alert and oriented, answers appropriate        Electronically signed by:    Rickey Rodriguez MD  [facility] Pulmonary and Critical Care Medicine

## 2021-06-25 NOTE — PATIENT INSTRUCTIONS - HE
For GERD:   - start Zantac twice daily   - Elevate Head of Bed with wedge/reflux pillow   - continue avoiding eating/drinking within 3 hours of bedtime and avoiding trigger foods (alcohol, caffeine and spicy foods)    For COPD:   - cont Spiriva daily  - albuterol inhaler if needed    For bronchiectasis:   - lab tests    - would recommend the flutter valve once daily   - if you do cough something up, collect & send for culture    Follow-up with primary MD or urologist for nocturnal urinary frequency    Return to clinic in 2 months to follow up on above

## 2021-06-25 NOTE — PROGRESS NOTES
Assessment and Plan:  85 male with history of HTN, DM, never smoker, presented to pulmonary clinic 12/21/2018 for evaluation of chronic dyspnea on exertion. PFTs show severe obstruction with positive bronchodilator response and probably underestimated lung volumes as these were done with N2 washout. Unclear why he should have any obstruction as he is essentially a never smoker. No relevant occupational exposures. He did not improve with trial of ICS/LABA. He appeared to be in atrial fibrillation/atrial flutter with RVR based on EKG done in clinic, and was sent to the ED for evaluation.  Atrial fibrillation was confirmed, an outpatient cardioversion was attempted however unsuccessful, and the patient is now rate controlled.       He tried Symbicort before, but did not tolerate it.  That his pulmonary function tests and CT show severe COPD & emphysema is surprising in that he has never smoked, and has had no occupational exposures nor secondhand smoke exposure aside from living with his father up until the age of 18 years old. He was started on Spiriva 3/6 and states that he has not had significant dyspnea since then and is rarely needing to use his rescue albuterol inhaler.     3/15/2019 chest CT: severe emphysema, mild BLL bronchiectasis  3/15/2019 devin: severe COPD, FEV1 & FVC not significantly changed from 10/23/2018 PFTs. Diffusing capacity moderately reduced     Recommendations:  -Alpha-1 antitrypsin level was on the low end of normal, genotyping ordered given degree of COPD/emphysema being out of proportion with his smoking hx  -bronchiectasis workup labs ordered  -send sputum for culture if patient produces any  -cont Spiriva and albuterol as needed  -start Zantac two times a day, elevate HOB for suspected reflux aspiration component of LL bronchiectasis  -Patient declines pulmonary rehab as he feels his breathing is not significantly limiting his mobility at this time  -patient has attempted flutter valve,  but stopped using as he did not feel it helped  -Return to pulmonary clinic in 2 months to follow-up on above    Note: patient also complaining of urinary frequency waking him up 2-3 x's per night, denies isomnia at other times. He states he has not yet sought workup for this from his primary MD or a urologist - I encouraged him to seek workup and treatment from his PCP     CCx: dyspnea    HPI: Patient states since starting Spiriva he has not had significant dyspnea and is rarely needing to use his rescue albuterol inhaler. He attempted a flutter valve, but stopped using as he did not feel it helped. He denies cough, f/c, or other resp complaints. His main complaint now is urinary frequency at night.    ROS:  A review of 12 organ systems was performed with pertinent positives and negatives noted in the HPI.      Current Meds:  Current Outpatient Medications   Medication Sig     albuterol (PROAIR HFA;PROVENTIL HFA;VENTOLIN HFA) 90 mcg/actuation inhaler Inhale 2 puffs every 6 (six) hours as needed for wheezing or shortness of breath. Use with spacer     cholecalciferol, vitamin D3, 1,000 unit tablet Take 1,000 Units by mouth daily.     diltiazem (CARDIZEM CD) 120 MG 24 hr capsule Take 1 capsule (120 mg total) by mouth daily.     flecainide (TAMBOCOR) 100 MG tablet Take 1 tablet (100 mg total) by mouth 2 (two) times a day.     levothyroxine (SYNTHROID, LEVOTHROID) 175 MCG tablet TAKE 1 TABLET BY MOUTH EVERY MORNING AT 6 AM     losartan (COZAAR) 25 MG tablet Take 1 tablet (25 mg total) by mouth daily.     metFORMIN (GLUCOPHAGE) 500 MG tablet Take 1 tablet (500 mg total) by mouth 2 (two) times a day with meals.     multivitamin with minerals (THERA-M) 9 mg iron-400 mcg Tab tablet Take 1 tablet by mouth daily.     niacin 500 MG tablet Take 500 mg by mouth.     rivaroxaban 20 mg Tab Take 1 tablet (20 mg total) by mouth daily with supper.     tiotropium (SPIRIVA) 18 mcg inhalation capsule Place 1 capsule (2 puffs total)  "into inhaler and inhale daily.     ranitidine (ZANTAC) 150 MG tablet Take 1 tablet (150 mg total) by mouth 2 (two) times a day.       Labs:  No results found for this or any previous visit (from the past 72 hour(s)).    I have personally reviewed all pertinent imaging studies and PFT results unless otherwise noted.    Imaging studies:  Ct Chest Without Contrast    Result Date: 3/15/2019  EXAM: CT CHEST WO CONTRAST LOCATION: [facility] DATE/TIME: 3/15/2019 8:58 AM INDICATION: Dyspnea chronic, had xray. COMPARISON: Chest x-ray 12/21/2018. TECHNIQUE: Helical images were obtained through the chest. Multiplanar reformats were obtained. Dose reduction techniques were used. IV CONTRAST: None. FINDINGS: LUNGS AND PLEURA: Severe emphysema. There is scarring in the lateral left upper lobe. Mild cylindrical bronchiectasis in both lower lobes. Focal atelectasis or mild consolidation in the posterior costophrenic sulcus on the right. No pulmonary nodules. Trace left pleural effusion. MEDIASTINUM: No lymphadenopathy. Moderate coronary artery calcification. LIMITED UPPER ABDOMEN: Trace perihepatic ascites. Splenomegaly. MUSCULOSKELETAL: There are multiple old right rib fractures.     CONCLUSION: 1.  Severe emphysema. 2.  Bronchiectasis in both lower lobes. 3.  Focal atelectasis or less likely infiltrate in the right lower lobe posterior costophrenic sulcus. 4.  Minimal ascites and splenomegaly noted in the upper abdomen.         Physical Exam:  /60   Pulse 73   Ht 5' 10.5\" (1.791 m)   Wt 196 lb (88.9 kg)   SpO2 93%   BMI 27.73 kg/m    General - Well nourished  Ears/Mouth -  OP pink moist, no thrush  Neck - no cervical lymphadenopathy  Lungs - Clear to ausculation bilaterally   CVS - regular rhythm with no murmurs, rubs or gallups  Abdomen - soft, NT, ND, NABS  Ext - no cyanosis, clubbing or edema  Skin - no rash  Psychology - alert and oriented, answers appropriate        Electronically signed by:    Rickey" Michael MIRELES  [facility] Pulmonary and Critical Care Medicine

## 2021-06-25 NOTE — PATIENT INSTRUCTIONS - HE
Campos Ocampo,    It was a pleasure to see you today at the [facility].     My recommendations after this visit include:    Continue current medications.    Wear 2 week monitor to evaluate for recurrence of A fib.  Press button to record symptoms.    Follow up in 4 weeks.    Ashley Hutchinson, [facility], Electrophysiology  [phone]  Svitlana (nurse) [phone]

## 2021-06-25 NOTE — PROGRESS NOTES
Pre spirometry done with diffusion studies.  Albuterol 2.5 mg neb given for BD.  Ats standards met , patient montserrat well, results scanned to patients chart.   Orthopedic

## 2021-06-27 NOTE — PROGRESS NOTES
Progress Notes by Ashley Hutchinson CNP at 3/14/2019  3:30 PM     Author: Ashley Hutchinson CNP Service: -- Author Type: Nurse Practitioner    Filed: 3/14/2019  4:25 PM Encounter Date: 3/14/2019 Status: Signed    : Ashley Hutchinson CNP (Nurse Practitioner)           Click to link to [facility]     [facility] HEART [facility] ELECTROPHYSIOLOGY NOTE      Assessment/Recommendations   Assessment/Plan:    1.  Persistent atrial fibrillation: He spontaneously converted to sinus rhythm after starting flecainide and seems to be primarily maintaining sinus rhythm, though he has weekly episodes that may be related to A. fib breakthrough.  He will wear a 2-week monitor for further evaluation.  He was instructed to press the button to record any symptoms he may have.  He was also instructed to press the button to record his rhythm when he is up frequently at night to see if arrhythmia is what is really disrupting his sleep.  We briefly discussed treatment options of antiarrhythmic medications versus pulmonary vein isolation ablation.  If he is indeed having breakthrough A. fib, will increase flecainide to 150 mg twice daily and further discuss treatment options.    He was reassured that atrial fibrillation is not life-threatening, but carries an increased risk for stroke.  He has a [id number] score of 4 for age >75, hypertension, and diabetes.  Continue Xarelto 20 mg daily which he takes with his evening meal.  He denies any side effects, bleeding issues, or missed doses.    2.  Hypertension: Blood pressure at target today.    Follow up in 4 weeks.     History of Present Illness    Mr. Campos Ocampo is a very pleasant 85 y.o. male who comes in today accompanied by his wife for EP follow-up of atrial fibrillation.  He was newly diagnosed with atrial fibrillation with rapid ventricular response on 12/21/2018.  He has had symptoms of progressive dyspnea on exertion for the past couple of months along with swelling of his  ankles.  He was initially evaluated by pulmonology, where he was noted to have a fast irregular rhythm and sent to the emergency department where he was documented in A. fib with RVR.  Ventricular rates were controlled with diltiazem and he was started on Xarelto for stroke prophylaxis.   He had a normal stress echo done in August 2018 at which time he was in sinus rhythm.  He was in sinus rhythm at the time of his nuclear stress test in October 2018 which was also negative.  With controlled ventricular rates, he had no specific awareness of arrhythmia, but continued to have dyspnea on exertion, some fatigue, and mild lower extremity edema.  He underwent cardioversion on 2/11/2019, but with early recurrence of A. fib.  He was started on flecainide 100 mg twice daily with diltiazem 120 mg daily.  He presented for repeat cardioversion on 2/18/2019 in sinus rhythm.  He reported that his heart rate had been consistently in the 60s and 70s, but he did not noticed any significant difference in how he was feeling.     Campos states that he believes he is still in normal rhythm, though approximately once a week notes that his heart rates jumped up to the 90s associated with hand tremor and fatigue.  He has ongoing dyspnea on exertion, but also has significant COPD.   He denies chest discomfort, palpitations, abdominal fullness/bloating, shortness of breath at rest, paroxysmal nocturnal dyspnea, orthopnea, lightheadedness, dizziness, pre-syncope, or syncope.  He is primary complaint is frequent urination at night that is preventing him from getting adequate sleep.  He denies snoring and does not believe sleep apnea is an issue.    Cardiographics (personally reviewed):  EKG done 2/18/2019 shows sinus rhythm at 67 bpm, first-degree AV block, QT/QTc interval measures 440/464 ms  EKG, Done 12/26/2018 shows coarse atrial fibrillation with controlled ventricular response at 84 bpm  EKG done 12/21/2018 shows atrial fibrillation  with rapid ventricular response at 121 bpm  EKG done 7/31/2018 shows sinus rhythm at 66 bpm, QT/QTc interval measures 406/416 ms    Stress Echo done 8/16/2018 shows normal left ventricular systolic performance, LVEF 60-65%, no regional wall motion abnormalities, no significant valvular disease.  Negative for myocardial ischemia or infarction.  Patient was in sinus rhythm    NM stress test done 10/16/2018 is negative for inducible myocardial ischemia or infarction.  LVEF 76%.  Patient in sinus rhythm without ischemic changes or arrhythmias during either stress or recovery.        Physical Examination Review of Systems   Vitals:    03/14/19 1529   BP: 108/60   Pulse: 80   Resp: 16     Body mass index is 27.44 kg/m .  Wt Readings from Last 3 Encounters:   03/14/19 196 lb (88.9 kg)   03/06/19 194 lb (88 kg)   02/18/19 193 lb (87.5 kg)     General Appearance:   Alert, well-appearing and in no acute distress.   HEENT: Atraumatic, normocephalic.  No scleral icterus, normal conjunctivae, EOM intact, PERRL; mucous membranes pink and moist.     Chest: Chest symmetric, spine straight.   Lungs:   Respirations unlabored: Lungs are clear to auscultation.   Cardiovascular:   Normal first and second heart sounds with no murmurs, rubs, or gallops.  Regular rate and rhythm.  Radial and posterior tibial pulses are intact, no edema.   Abdomen:  Soft, nontender, nondistended, bowel sounds present   Extremities: No cyanosis or clubbing   Musculoskeletal: Moves all extremities   Skin: Warm, dry, intact.    Neurologic: Mood and affect are appropriate, alert and oriented to person, place, time, and situation    General: WNL  Eyes: WNL  Ears/Nose/Throat: WNL  Lungs: WNL  Heart: WNL  Stomach: WNL  Bladder: WNL  Muscle/Joints: WNL  Skin: WNL  Nervous System: WNL  Mental Health: WNL     Blood: WNL     Medical History  Surgical History Family History Social History   Past Medical History:   Diagnosis Date   ? Atrial fibrillation (H)    ?  Diabetes mellitus (H)    ? Hypertension    ? Ptosis, left eyelid    ? Shingles     Past Surgical History:   Procedure Laterality Date   ? CARDIOVERSION  02/11/2019    nsr x 1 min, then afib   ? CHOLECYSTECTOMY     ? eyelid surgery Left 04/2018   ? KIDNEY STONE SURGERY  2018    Family History   Problem Relation Age of Onset   ? Stroke Mother         cerebral hemorrage   ? Emphysema Father     Social History     Socioeconomic History   ? Marital status:      Spouse name: Not on file   ? Number of children: 4   ? Years of education: Not on file   ? Highest education level: Not on file   Occupational History   ? Not on file   Social Needs   ? Financial resource strain: Not on file   ? Food insecurity:     Worry: Not on file     Inability: Not on file   ? Transportation needs:     Medical: Not on file     Non-medical: Not on file   Tobacco Use   ? Smoking status: Never Smoker   ? Smokeless tobacco: Never Used   Substance and Sexual Activity   ? Alcohol use: Yes     Alcohol/week: 0.6 oz     Types: 1 Glasses of wine per week   ? Drug use: No   ? Sexual activity: No   Lifestyle   ? Physical activity:     Days per week: Not on file     Minutes per session: Not on file   ? Stress: Not on file   Relationships   ? Social connections:     Talks on phone: Not on file     Gets together: Not on file     Attends Jehovah's witness service: Not on file     Active member of club or organization: Not on file     Attends meetings of clubs or organizations: Not on file     Relationship status: Not on file   ? Intimate partner violence:     Fear of current or ex partner: Not on file     Emotionally abused: Not on file     Physically abused: Not on file     Forced sexual activity: Not on file   Other Topics Concern   ? Not on file   Social History Narrative    Has 4 sons. Has 7 grandchildren. Has 1 great grandchild.     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     Has been  63 years in 2018.     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           Medications  Allergies   Current Outpatient Medications   Medication Sig Dispense Refill   ? albuterol (PROAIR HFA;PROVENTIL HFA;VENTOLIN HFA) 90 mcg/actuation inhaler Inhale 2 puffs every 6 (six) hours as needed for wheezing or shortness of breath. Use with spacer 11 Inhaler 11   ? cholecalciferol, vitamin D3, 1,000 unit tablet Take 1,000 Units by mouth daily.     ? diltiazem (CARDIZEM CD) 120 MG 24 hr capsule Take 1 capsule (120 mg total) by mouth daily. 90 capsule 3   ? flecainide (TAMBOCOR) 100 MG tablet Take 1 tablet (100 mg total) by mouth 2 (two) times a day. 60 tablet 6   ? levothyroxine (SYNTHROID, LEVOTHROID) 175 MCG tablet TAKE 1 TABLET BY MOUTH EVERY MORNING AT 6 AM 90 tablet 0   ? losartan (COZAAR) 25 MG tablet Take 1 tablet (25 mg total) by mouth daily. 90 tablet 3   ? metFORMIN (GLUCOPHAGE) 500 MG tablet Take 1 tablet (500 mg total) by mouth 2 (two) times a day with meals. 180 tablet 1   ? multivitamin with minerals (THERA-M) 9 mg iron-400 mcg Tab tablet Take 1 tablet by mouth daily.     ? niacin 500 MG tablet Take 500 mg by mouth.     ? rivaroxaban 20 mg Tab Take 1 tablet (20 mg total) by mouth daily with supper. 90 tablet 3   ? tiotropium (SPIRIVA) 18 mcg inhalation capsule Place 1 capsule (2 puffs total) into inhaler and inhale daily. 30 capsule 11     No current facility-administered medications for this visit.       Allergies   Allergen Reactions   ? Penicillins Unknown      Medical, surgical, family, social history, and medications were all reviewed and updated as necessary.   Lab Results    Chemistry CBC Other   Lab Results   Component Value Date    CREATININE 1.10 02/07/2019    BUN 19 02/07/2019     02/07/2019    K 5.5 (H) 02/07/2019     02/07/2019    CO2 29 02/07/2019     Creatinine (mg/dL)   Date Value   02/07/2019 1.10   12/21/2018  0.85   09/06/2018 1.09   07/31/2018 1.15    Lab Results   Component Value Date    WBC 4.6 02/07/2019    HGB 12.3 (L) 02/07/2019    HCT 37.3 (L) 02/07/2019    MCV 99 02/07/2019     (L) 02/07/2019      Lab Results   Component Value Date    TSH 0.75 02/07/2019                This note has been dictated using voice recognition software. Any grammatical, typographical, or context distortions are unintentional and inherent to the software.    Ashley Hutchinson, [facility]   Electrophysiology

## 2021-06-27 NOTE — PROGRESS NOTES
Progress Notes by Ashley Hutchinson CNP at 5/30/2019  3:30 PM     Author: Ashley Hutchinson CNP Service: -- Author Type: Nurse Practitioner    Filed: 5/30/2019  4:39 PM Encounter Date: 5/30/2019 Status: Signed    : Ashley Hutchinson CNP (Nurse Practitioner)           Click to link to [facility] HEART [facility] ELECTROPHYSIOLOGY NOTE      Assessment/Recommendations   Assessment/Plan:    1.  Persistent atrial fibrillation and flutter: Spontaneous conversion to sinus rhythm with starting sotalol.  No symptomatology or evidence of recurrence.  We reviewed the physiology and natural progression of atrial fibrillation as well as treatment options of antiarrhythmic medications versus pulmonary vein isolation ablation.  Antiarrhythmic medications are limited due to his COPD, though this may also adversely effect ablation success.  He is considering whether to remain on antiarrhythmic medications versus pursue ablation.  If he opts to pursue ablation, he needs a standard echo to assess his valve status as well as left atrium.  We also discussed avoidance of possible triggers.  He was instructed to increase his daily fluid intake.  QTc interval remains well within safety parameters and he had no worsening shortness of breath.  Continue sotalol 80 mg twice daily.    He was reassured that atrial fibrillation is not life-threatening, but carries an increased risk for stroke.  He has a [id number] score of 4 for age >75, hypertension, and diabetes.  Continue Xarelto 20 mg daily which he takes with his evening meal.  He denies any side effects, bleeding issues, or missed doses.    2.  Hypertension: Blood pressure at target today on current dose of sotalol.    3.  Shortness of breath and lower extremity edema: Shortness of breath appears to be primarily due to COPD.  However, he is still retaining a little bit of fluid.  Take 40 mg of furosemide for 2 days, then decrease to a maintenance dose of 20 mg daily.  He  was instructed to call if symptoms worsen, or fail to improve.    Follow up in 3 months.     History of Present Illness    Mr. Campos Ocampo is a very pleasant 85 y.o. male who comes in today for EP follow-up of atrial fibrillation.  He was newly diagnosed with atrial fibrillation with rapid ventricular response on 12/21/2018, though onset of symptoms predated diagnosis by a couple of months. Symptoms consist of worsened dyspnea on exertion, fatigue, and mild lower extremity edema.   He had a normal stress echo done in August 2018 at which time he was in sinus rhythm.  He was in sinus rhythm at the time of his nuclear stress test in October 2018 which was also negative.   He underwent cardioversion on 2/11/2019, but with early recurrence of A. fib.  He failed antiarrhythmic therapy with flecainide.  He spontaneously converted to sinus rhythm after starting sotalol 80 mg twice daily.  He remains on Xarelto 20 mg daily for stroke prophylaxis.  He has a history of hypertension, hyperlipidemia, type 2 diabetes, and COPD.  He also has a history of anemia, though hemoglobins have been stable >12.    Campos states that he has been feeling better with regard to fatigue.  He also states that he is sleeping better.  He continues to occasionally have episodes of fatigue as well as intermittent diarrhea.  He believes he has maintained sinus rhythm without recurrence of atrial fibrillation.  He continues to have shortness of breath and lower extremity edema.  Shortness of breath did not change after starting sotalol and has been working with pulmonary.  He denies chest discomfort, palpitations, abdominal fullness/bloating, shortness of breath at rest, paroxysmal nocturnal dyspnea, orthopnea, lightheadedness, dizziness, pre-syncope, or syncope.     Cardiographics (personally reviewed):  EKG done 4/29/2019 shows sinus rhythm at 64 bpm QT/QTc interval measures 440/453 ms  EKG, Done 12/26/2018 shows coarse atrial fibrillation  with controlled ventricular response at 84 bpm  EKG done 12/21/2018 shows atrial fibrillation with rapid ventricular response at 121 bpm  EKG done 7/31/2018 shows sinus rhythm at 66 bpm, QT/QTc interval measures 406/416 ms    24-hour Holter monitor worn 4/19/2019 shows persistent atrial flutter with 2: 1 conduction and average ventricular response of 85 to 100 bpm.    2-week event monitor worn 3/15/2019 to 3/28/2019 showed sinus rhythm with heart rates in the 70s, normal electrocardiographic intervals.  Paroxysmal atrial fibrillation with an estimated burden of 7%, longest episode being just under 8 hours.  Ventricular rates are controlled between 80 and 100 bpm with short bursts of RVR.  No significant bradycardia or pauses.    Stress Echo done 8/16/2018 shows normal left ventricular systolic performance, LVEF 60-65%, no regional wall motion abnormalities, no significant valvular disease.  Negative for myocardial ischemia or infarction.  Patient was in sinus rhythm    NM stress test done 10/16/2018 is negative for inducible myocardial ischemia or infarction.  LVEF 76%.  Patient in sinus rhythm without ischemic changes or arrhythmias during either stress or recovery.        Physical Examination Review of Systems   Vitals:    05/30/19 1541   BP: 122/76   Pulse: 65   Resp: 14     Body mass index is 27.7 kg/m .  Wt Readings from Last 3 Encounters:   05/30/19 200 lb (90.7 kg)   05/17/19 201 lb (91.2 kg)   04/29/19 197 lb (89.4 kg)     General Appearance:   Alert, well-appearing and in no acute distress.   HEENT: Atraumatic, normocephalic.  No scleral icterus, normal conjunctivae, EOM intact, PERRL; mucous membranes pink and moist.     Chest: Chest symmetric, spine straight.   Lungs:   Respirations unlabored: Lungs are clear to auscultation.   Cardiovascular:   Normal first and second heart sounds with no murmurs, rubs, or gallops.  Regular rate and rhythm.  Radial and posterior tibial pulses are intact, 1+ bilateral  lower extremity edema.   Abdomen:  Soft, nontender, nondistended, bowel sounds present   Extremities: No cyanosis or clubbing   Musculoskeletal: Moves all extremities   Skin: Warm, dry, intact.    Neurologic: Mood and affect are appropriate, alert and oriented to person, place, time, and situation    General: WNL  Eyes: WNL  Ears/Nose/Throat: WNL  Lungs: Shortness of Breath  Heart: Shortness of Breath with activity  Stomach: Diarrhea  Bladder: WNL  Muscle/Joints: WNL  Skin: WNL  Nervous System: WNL  Mental Health: WNL           Medical History  Surgical History Family History Social History   Past Medical History:   Diagnosis Date   ? Atrial fibrillation (H)    ? Diabetes mellitus (H)    ? Hypertension    ? Ptosis, left eyelid    ? Shingles     Past Surgical History:   Procedure Laterality Date   ? CARDIOVERSION  02/11/2019    nsr x 1 min, then afib   ? CHOLECYSTECTOMY     ? eyelid surgery Left 04/2018   ? KIDNEY STONE SURGERY  2018    Family History   Problem Relation Age of Onset   ? Stroke Mother         cerebral hemorrage   ? Emphysema Father     Social History     Socioeconomic History   ? Marital status:      Spouse name: Not on file   ? Number of children: 4   ? Years of education: Not on file   ? Highest education level: Not on file   Occupational History   ? Not on file   Social Needs   ? Financial resource strain: Not on file   ? Food insecurity:     Worry: Not on file     Inability: Not on file   ? Transportation needs:     Medical: Not on file     Non-medical: Not on file   Tobacco Use   ? Smoking status: Never Smoker   ? Smokeless tobacco: Never Used   Substance and Sexual Activity   ? Alcohol use: Yes     Alcohol/week: 0.6 oz     Types: 1 Glasses of wine per week   ? Drug use: No   ? Sexual activity: Never   Lifestyle   ? Physical activity:     Days per week: Not on file     Minutes per session: Not on file   ? Stress: Not on file   Relationships   ? Social connections:     Talks on phone: Not  on file     Gets together: Not on file     Attends Jew service: Not on file     Active member of club or organization: Not on file     Attends meetings of clubs or organizations: Not on file     Relationship status: Not on file   ? Intimate partner violence:     Fear of current or ex partner: Not on file     Emotionally abused: Not on file     Physically abused: Not on file     Forced sexual activity: Not on file   Other Topics Concern   ? Not on file   Social History Narrative    Has 4 sons. Has 7 grandchildren. Has 1 great grandchild.     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     Has been  63 years in 2018.     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           Medications  Allergies   Current Outpatient Medications   Medication Sig Dispense Refill   ? albuterol (PROAIR HFA;PROVENTIL HFA;VENTOLIN HFA) 90 mcg/actuation inhaler Inhale 2 puffs every 6 (six) hours as needed for wheezing or shortness of breath. Use with spacer 11 Inhaler 11   ? cholecalciferol, vitamin D3, 1,000 unit tablet Take 1,000 Units by mouth daily.     ? levothyroxine (SYNTHROID, LEVOTHROID) 175 MCG tablet TAKE 1 TABLET BY MOUTH EVERY MORNING AT 6 AM 90 tablet 0   ? multivitamin with minerals (THERA-M) 9 mg iron-400 mcg Tab tablet Take 1 tablet by mouth daily.     ? niacin 500 MG tablet Take 500 mg by mouth.     ? rivaroxaban 20 mg Tab Take 1 tablet (20 mg total) by mouth daily with supper. 90 tablet 3   ? sotalol (BETAPACE) 80 MG tablet Take 1 tablet (80 mg total) by mouth 2 (two) times a day. 180 tablet 3   ? furosemide (LASIX) 20 MG tablet Take 1 tablet (20 mg total) by mouth daily. 90 tablet 3     No current facility-administered medications for this visit.       Allergies   Allergen Reactions   ? Penicillins Unknown      Medical, surgical, family, social history, and medications were all  reviewed and updated as necessary.   Lab Results    Chemistry CBC Other   Lab Results   Component Value Date    CREATININE 1.10 02/07/2019    BUN 19 02/07/2019     02/07/2019    K 5.5 (H) 02/07/2019     02/07/2019    CO2 29 02/07/2019     Creatinine (mg/dL)   Date Value   02/07/2019 1.10   12/21/2018 0.85   09/06/2018 1.09   07/31/2018 1.15    Lab Results   Component Value Date    WBC 8.1 03/20/2019    HGB 12.1 (L) 03/20/2019    HCT 36.4 (L) 03/20/2019    MCV 99 03/20/2019     03/20/2019      Lab Results   Component Value Date    TSH 0.75 02/07/2019              This note has been dictated using voice recognition software. Any grammatical, typographical, or context distortions are unintentional and inherent to the software.    Ashley Hutchinson, [facility]   Electrophysiology

## 2021-06-27 NOTE — PROGRESS NOTES
Progress Notes by Ashley Hutchinson CNP at 12/26/2018 12:50 PM     Author: Ashley Hutchinson CNP Service: -- Author Type: Nurse Practitioner    Filed: 12/26/2018  4:51 PM Encounter Date: 12/26/2018 Status: Signed    : Ashley Hutchinson CNP (Nurse Practitioner)           Click to link to [facility]     [facility] HEART [facility] ELECTROPHYSIOLOGY NOTE      Assessment/Recommendations   Assessment/Plan:    1.  Persistent atrial fibrillation: Newly diagnosed 12/21/2018, onset sometime after 10/16/2018.  It appears that symptoms consist of dyspnea on exertion and lower extremity edema.  We had a lengthy discussion of the physiology and natural progression of atrial fibrillation as well as treatment options of rate control versus rhythm control depending upon the presence of symptoms.  We also discussed the importance of avoiding possible triggers.  He was given some information to review at home.  Recommend cardioversion for symptom clarification.  Continue diltiazem 60 mg twice daily.  May take an extra diltiazem 60 mg as needed for heart rate >100 bpm.  We further discussed cardioversion, preprocedure instructions, cardioversion procedure, risks, expected recovery, and follow-up.  He was given some information to review at home.  His questions were answered to his satisfaction and he wishes to proceed.    He was reassured that atrial fibrillation is not life-threatening, but carries an increased risk for stroke.  He has a [id number] score of 4 for age >75, hypertension, and diabetes.  Per current guidelines, recommend long-term oral anticoagulation for stroke prophylaxis.  Change Xarelto to 20 mg daily to be taken with his evening meal.  We discussed the importance of not missing any doses.  He will be eligible for cardioversion when he returns in early February.    2.  Hypertension: Blood pressure at target today.    Follow up 3-4 weeks after cardioversion.     History of Present Illness    Mr. Campos Ocampo  is a very pleasant 85 y.o. male who comes in today accompanied by his wife for EP consultation of atrial fibrillation.  He was newly diagnosed with atrial fibrillation with rapid ventricular response on 12/21/2018.  He has had symptoms of progressive dyspnea on exertion for the past couple of months along with swelling of his ankles.  He was initially evaluated by pulmonology, where he was noted to have a fast irregular rhythm and sent to the emergency department where he was documented in A. fib with RVR.  Ventricular rates were controlled with diltiazem and he was started on Xarelto for stroke prophylaxis.   He had a normal stress echo done in August 2018 at which time he was in sinus rhythm.  He was in sinus rhythm at the time of his nuclear stress test in October 2018 which was also negative.  He continues to have dyspnea on exertion, some fatigue, and mild lower extremity edema.  He notes no specific awareness of arrhythmia.  He denies chest discomfort, palpitations, abdominal fullness/bloating, shortness of breath, paroxysmal nocturnal dyspnea, orthopnea, lightheadedness, dizziness, pre-syncope, or syncope.    Cardiographics (personally reviewed):  EKG, Done 12/26/2018 shows coarse atrial fibrillation with controlled ventricular response at 84 bpm  EKG done 12/21/2018 shows atrial fibrillation with rapid ventricular response at 121 bpm  EKG done 7/31/2018 shows sinus rhythm at 66 bpm, QT/QTc interval measures 406/416 ms    Stress Echo done 8/16/2018 shows normal left ventricular systolic performance, LVEF 60-65%, no regional wall motion abnormalities, no significant valvular disease.  Negative for myocardial ischemia or infarction.  Patient was in sinus rhythm    NM stress test done 10/16/2018 is negative for inducible myocardial ischemia or infarction.  LVEF 76%.  Patient in sinus rhythm without ischemic changes or arrhythmias during either stress or recovery.        Physical Examination Review of Systems    Vitals:    12/26/18 1248   BP: 108/62   Pulse: 96   Resp: 20     Body mass index is 26.52 kg/m .  Wt Readings from Last 3 Encounters:   12/26/18 192 lb 12.8 oz (87.5 kg)   12/21/18 190 lb (86.2 kg)   12/21/18 190 lb (86.2 kg)     General Appearance:   Alert, well-appearing and in no acute distress.   HEENT: Atraumatic, normocephalic.  No scleral icterus, normal conjunctivae, EOM intact, PERRL; mucous membranes pink and moist.  No obvious thyromegaly.   Chest: Chest symmetric, spine straight.   Lungs:   Respirations unlabored: Lungs are clear to auscultation.   Cardiovascular:   Normal first and second heart sounds with no murmurs, rubs, or gallops.  Irregularly irregular rate and rhythm.  Radial and posterior tibial pulses are intact, no JVD, plus bilateral lower extremity edema.   Abdomen:  Soft, nontender, nondistended, bowel sounds present   Extremities: No cyanosis or clubbing   Musculoskeletal: Moves all extremities   Skin: Warm, dry, intact.    Neurologic: Mood and affect are appropriate, alert and oriented to person, place, time, and situation    General: WNL  Eyes: WNL  Ears/Nose/Throat: WNL  Lungs: Shortness of Breath  Heart: Shortness of Breath with activity, Leg Swelling  Stomach: WNL  Bladder: WNL  Muscle/Joints: WNL  Skin: WNL  Nervous System: Daytime Sleepiness  Mental Health: WNL     Blood: WNL     Medical History  Surgical History Family History Social History   Past Medical History:   Diagnosis Date   ? Atrial fibrillation (H)    ? Diabetes mellitus (H)    ? Hypertension    ? Ptosis, left eyelid    ? Shingles     Past Surgical History:   Procedure Laterality Date   ? CHOLECYSTECTOMY     ? eyelid surgery Left 04/2018   ? KIDNEY STONE SURGERY  2018    Family History   Problem Relation Age of Onset   ? Stroke Mother         cerebral hemorrage   ? Emphysema Father     Social History     Socioeconomic History   ? Marital status:      Spouse name: Not on file   ? Number of children: 4   ? Years  of education: Not on file   ? Highest education level: Not on file   Social Needs   ? Financial resource strain: Not on file   ? Food insecurity - worry: Not on file   ? Food insecurity - inability: Not on file   ? Transportation needs - medical: Not on file   ? Transportation needs - non-medical: Not on file   Occupational History   ? Not on file   Tobacco Use   ? Smoking status: Never Smoker   ? Smokeless tobacco: Never Used   Substance and Sexual Activity   ? Alcohol use: Yes     Alcohol/week: 0.6 oz     Types: 1 Glasses of wine per week   ? Drug use: No   ? Sexual activity: No   Other Topics Concern   ? Not on file   Social History Narrative    Has 4 sons. Has 7 grandchildren. Has 1 great grandchild.     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     Has been  63 years in 2018.     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           Medications  Allergies   Current Outpatient Medications   Medication Sig Dispense Refill   ? aspirin-calcium carbonate 81 mg-300 mg calcium(777 mg) Tab Take 81 mg by mouth.     ? diltiazem (CARDIZEM SR) 60 MG 12 hr capsule Take 1 capsule (60 mg total) by mouth 2 (two) times a day. 180 capsule 3   ? levothyroxine (SYNTHROID, LEVOTHROID) 175 MCG tablet TAKE 1 TABLET BY MOUTH EVERY MORNING AT 6 AM 90 tablet 0   ? losartan (COZAAR) 25 MG tablet Take 1 tablet (25 mg total) by mouth daily. 90 tablet 0   ? metFORMIN (GLUCOPHAGE) 500 MG tablet Take 1 tablet (500 mg total) by mouth 2 (two) times a day with meals. 180 tablet 1   ? multivitamin (ONE A DAY) per tablet Take 1 tablet by mouth.     ? niacin 500 MG tablet Take 500 mg by mouth.     ? rivaroxaban 20 mg Tab Take 1 tablet (20 mg total) by mouth daily with supper. 90 tablet 3     No current facility-administered medications for this visit.       Allergies   Allergen Reactions   ? Penicillins Unknown       Medical, surgical, family, social history, and medications were all reviewed and updated as necessary.   Lab Results    Chemistry CBC Other   Lab Results   Component Value Date    CREATININE 0.85 12/21/2018    BUN 22 12/21/2018     12/21/2018    K 4.1 12/21/2018     12/21/2018    CO2 26 12/21/2018     Creatinine (mg/dL)   Date Value   12/21/2018 0.85   09/06/2018 1.09   07/31/2018 1.15    Lab Results   Component Value Date    WBC 6.1 12/21/2018    HGB 13.0 (L) 12/21/2018    HCT 38.6 (L) 12/21/2018    MCV 97 12/21/2018     12/21/2018    Lab Results   Component Value Date    INR 1.20 (H) 12/21/2018     Lab Results   Component Value Date    TSH 1.51 09/06/2018            60 minutes were spent face to face in this visit with more than 50% spent discussing diagnoses as listed above, counseling, and coordination of care.    This note has been dictated using voice recognition software. Any grammatical, typographical, or context distortions are unintentional and inherent to the software.    Ashley Hutchinson, [facility] [place] [facility]   Electrophysiology

## 2021-06-27 NOTE — PROGRESS NOTES
Progress Notes by Ashley Hutchinson CNP at 4/25/2019 10:30 AM     Author: Ashley Hutchinson CNP Service: -- Author Type: Nurse Practitioner    Filed: 4/25/2019 12:03 PM Encounter Date: 4/25/2019 Status: Signed    : Ashley Hutchinson CNP (Nurse Practitioner)           Click to link to [facility] HEART [facility] ELECTROPHYSIOLOGY NOTE      Assessment/Recommendations   Assessment/Plan:    1.  Persistent atrial fibrillation and flutter: He has failed antiarrhythmic therapy with flecainide and is currently in persistent atrial flutter with controlled ventricular response.  Symptoms primarily consist of fatigue and worsening dyspnea on exertion.  Discontinue flecainide.  On Saturday, start sotalol 80 mg twice daily and discontinue diltiazem and losartan.  EKG on Monday for QTc interval monitoring.  If he remains in A. fib or flutter, plan for cardioversion.  He was instructed to call if he develops worsening shortness of breath with the start of sotalol, to discontinue sotalol and restart diltiazem and losartan.     We had a lengthy discussion of the physiology and natural progression of atrial fibrillation as well as treatment options of antiarrhythmic medications versus pulmonary vein isolation ablation.  We further discussed ablation including the procedure, less than 1% risk for complications, and expected recovery.  We discussed the potential limitations of antiarrhythmic medications due to his COPD, though this may also adversely effect ablation success.  He is a very active gentleman, so would prefer to undergo ablation.  He was given some information to review at home.  We also discussed the alternative of meeting Dr. Gutierrez in clinic prior to ablation or at the time of ablation.  I will have 1 of the EP nurses call him next week to see if he has made a decision regarding ablation.    He was reassured that atrial fibrillation is not life-threatening, but carries an increased risk for stroke.  He  has a [id number] score of 4 for age >75, hypertension, and diabetes.  Continue Xarelto 20 mg daily which he takes with his evening meal.  He denies any side effects, bleeding issues, or missed doses.    2.  Hypertension: Blood pressure at target today.  Continue losartan with initiation of sotalol to prevent hypotension.    Follow up in 1 month.     History of Present Illness    Mr. Campos Ocampo is a very pleasant 85 y.o. male who comes in today accompanied by his wife for EP follow-up of atrial fibrillation.  He was newly diagnosed with atrial fibrillation with rapid ventricular response on 12/21/2018, though onset of symptoms predated diagnosis by a couple of months. Symptoms consist of worsened dyspnea on exertion, fatigue, and mild lower extremity edema.   He had a normal stress echo done in August 2018 at which time he was in sinus rhythm.  He was in sinus rhythm at the time of his nuclear stress test in October 2018 which was also negative.   He underwent cardioversion on 2/11/2019, but with early recurrence of A. fib.  He was started on flecainide 100 mg twice daily with diltiazem 120 mg daily.  He presented for repeat cardioversion on 2/18/2019 in sinus rhythm.  He has since failed antiarrhythmic therapy with high-dose flecainide.  Recent Holter monitor showed persistent atrial flutter with fairly well-controlled ventricular response.  He remains on Xarelto 20 mg daily for stroke prophylaxis.  He also has a history of hypertension, hyperlipidemia, type 2 diabetes, and COPD.  He also has a history of anemia, though hemoglobins have been stable >12.    Campos notes worsening fatigue and dyspnea on exertion despite somewhat better heart rates.   He denies chest discomfort, palpitations, abdominal fullness/bloating, shortness of breath at rest, paroxysmal nocturnal dyspnea, orthopnea, lightheadedness, dizziness, pre-syncope, or syncope.  He denies snoring and does not believe sleep apnea is an  issue.    Cardiographics (personally reviewed):  EKG done 2/18/2019 shows sinus rhythm at 67 bpm, first-degree AV block, QT/QTc interval measures 440/464 ms  EKG, Done 12/26/2018 shows coarse atrial fibrillation with controlled ventricular response at 84 bpm  EKG done 12/21/2018 shows atrial fibrillation with rapid ventricular response at 121 bpm  EKG done 7/31/2018 shows sinus rhythm at 66 bpm, QT/QTc interval measures 406/416 ms    24-hour Holter monitor worn 4/19/2019 shows persistent atrial flutter with 2: 1 conduction and average ventricular response of 85 to 100 bpm.    2-week event monitor worn 3/15/2019 to 3/28/2019 showed sinus rhythm with heart rates in the 70s, normal electrocardiographic intervals.  Paroxysmal atrial fibrillation with an estimated burden of 7%, longest episode being just under 8 hours.  Ventricular rates are controlled between 80 and 100 bpm with short bursts of RVR.  No significant bradycardia or pauses.    Stress Echo done 8/16/2018 shows normal left ventricular systolic performance, LVEF 60-65%, no regional wall motion abnormalities, no significant valvular disease.  Negative for myocardial ischemia or infarction.  Patient was in sinus rhythm    NM stress test done 10/16/2018 is negative for inducible myocardial ischemia or infarction.  LVEF 76%.  Patient in sinus rhythm without ischemic changes or arrhythmias during either stress or recovery.        Physical Examination Review of Systems   Vitals:    04/25/19 1030   BP: 104/48   Pulse: 82   Resp: 16     Body mass index is 27.87 kg/m .  Wt Readings from Last 3 Encounters:   04/25/19 197 lb (89.4 kg)   03/19/19 196 lb (88.9 kg)   03/14/19 196 lb (88.9 kg)     General Appearance:   Alert, well-appearing and in no acute distress.   HEENT: Atraumatic, normocephalic.  No scleral icterus, normal conjunctivae, EOM intact, PERRL; mucous membranes pink and moist.     Chest: Chest symmetric, spine straight.   Lungs:   Respirations unlabored:  Lungs are clear to auscultation.   Cardiovascular:   Normal first and second heart sounds with no murmurs, rubs, or gallops.  Irregular rate and rhythm.  Radial and posterior tibial pulses are intact, no edema.   Abdomen:  Soft, nontender, nondistended, bowel sounds present   Extremities: No cyanosis or clubbing   Musculoskeletal: Moves all extremities   Skin: Warm, dry, intact.    Neurologic: Mood and affect are appropriate, alert and oriented to person, place, time, and situation    General: WNL  Eyes: WNL  Ears/Nose/Throat: WNL  Lungs: Shortness of Breath  Heart: Irregular Heartbeat  Stomach: WNL  Bladder: Frequent Urination at Night  Muscle/Joints: WNL  Skin: WNL  Nervous System: Daytime Sleepiness  Mental Health: WNL     Blood: WNL     Medical History  Surgical History Family History Social History   Past Medical History:   Diagnosis Date   ? Atrial fibrillation (H)    ? Diabetes mellitus (H)    ? Hypertension    ? Ptosis, left eyelid    ? Shingles     Past Surgical History:   Procedure Laterality Date   ? CARDIOVERSION  02/11/2019    nsr x 1 min, then afib   ? CHOLECYSTECTOMY     ? eyelid surgery Left 04/2018   ? KIDNEY STONE SURGERY  2018    Family History   Problem Relation Age of Onset   ? Stroke Mother         cerebral hemorrage   ? Emphysema Father     Social History     Socioeconomic History   ? Marital status:      Spouse name: Not on file   ? Number of children: 4   ? Years of education: Not on file   ? Highest education level: Not on file   Occupational History   ? Not on file   Social Needs   ? Financial resource strain: Not on file   ? Food insecurity:     Worry: Not on file     Inability: Not on file   ? Transportation needs:     Medical: Not on file     Non-medical: Not on file   Tobacco Use   ? Smoking status: Never Smoker   ? Smokeless tobacco: Never Used   Substance and Sexual Activity   ? Alcohol use: Yes     Alcohol/week: 0.6 oz     Types: 1 Glasses of wine per week   ? Drug use: No    ? Sexual activity: Never   Lifestyle   ? Physical activity:     Days per week: Not on file     Minutes per session: Not on file   ? Stress: Not on file   Relationships   ? Social connections:     Talks on phone: Not on file     Gets together: Not on file     Attends Zoroastrianism service: Not on file     Active member of club or organization: Not on file     Attends meetings of clubs or organizations: Not on file     Relationship status: Not on file   ? Intimate partner violence:     Fear of current or ex partner: Not on file     Emotionally abused: Not on file     Physically abused: Not on file     Forced sexual activity: Not on file   Other Topics Concern   ? Not on file   Social History Narrative    Has 4 sons. Has 7 grandchildren. Has 1 great grandchild.     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     Has been  63 years in 2018.     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           Medications  Allergies   Current Outpatient Medications   Medication Sig Dispense Refill   ? cholecalciferol, vitamin D3, 1,000 unit tablet Take 1,000 Units by mouth daily.     ? levothyroxine (SYNTHROID, LEVOTHROID) 175 MCG tablet TAKE 1 TABLET BY MOUTH EVERY MORNING AT 6 AM 90 tablet 0   ? metFORMIN (GLUCOPHAGE) 500 MG tablet Take 1 tablet (500 mg total) by mouth 2 (two) times a day with meals. 180 tablet 1   ? multivitamin with minerals (THERA-M) 9 mg iron-400 mcg Tab tablet Take 1 tablet by mouth daily.     ? niacin 500 MG tablet Take 500 mg by mouth.     ? ranitidine (ZANTAC) 150 MG tablet Take 1 tablet (150 mg total) by mouth 2 (two) times a day. 60 tablet 11   ? rivaroxaban 20 mg Tab Take 1 tablet (20 mg total) by mouth daily with supper. 90 tablet 3   ? tiotropium (SPIRIVA) 18 mcg inhalation capsule Place 1 capsule (2 puffs total) into inhaler and inhale daily. 90 capsule 1   ? albuterol  (PROAIR HFA;PROVENTIL HFA;VENTOLIN HFA) 90 mcg/actuation inhaler Inhale 2 puffs every 6 (six) hours as needed for wheezing or shortness of breath. Use with spacer 11 Inhaler 11   ? sotalol (BETAPACE) 80 MG tablet Take 1 tablet (80 mg total) by mouth 2 (two) times a day. 60 tablet 6     No current facility-administered medications for this visit.       Allergies   Allergen Reactions   ? Penicillins Unknown      Medical, surgical, family, social history, and medications were all reviewed and updated as necessary.   Lab Results    Chemistry CBC Other   Lab Results   Component Value Date    CREATININE 1.10 02/07/2019    BUN 19 02/07/2019     02/07/2019    K 5.5 (H) 02/07/2019     02/07/2019    CO2 29 02/07/2019     Creatinine (mg/dL)   Date Value   02/07/2019 1.10   12/21/2018 0.85   09/06/2018 1.09   07/31/2018 1.15    Lab Results   Component Value Date    WBC 8.1 03/20/2019    HGB 12.1 (L) 03/20/2019    HCT 36.4 (L) 03/20/2019    MCV 99 03/20/2019     03/20/2019      Lab Results   Component Value Date    TSH 0.75 02/07/2019                This note has been dictated using voice recognition software. Any grammatical, typographical, or context distortions are unintentional and inherent to the software.    Ashley Htuchinson, [facility]   Electrophysiology

## 2021-06-28 NOTE — PROGRESS NOTES
Progress Notes by Ashley Hutchinson CNP at 1/15/2020  9:10 AM     Author: Ashley Hutchinson CNP Service: -- Author Type: Nurse Practitioner    Filed: 1/15/2020 11:21 AM Encounter Date: 1/15/2020 Status: Signed    : Ashley Hutchinson CNP (Nurse Practitioner)             Assessment/Recommendations   Assessment/Plan:    1.  Persistent atrial fibrillation and flutter:  No symptomatology or evidence of recurrence of AF.  We reviewed the physiology and natural progression of atrial fibrillation as well as treatment options of rate control versus rhythm control depending upon the presence of symptoms.  His wife maintains that he is asymptomatic.  Antiarrhythmic medications are limited due to his COPD, which also makes him a less than ideal candidate for ablation.    Will reassess symptoms status when he has recurrence of A. fib..  QTc interval remains within safety parameters.  Continue sotalol 80 mg twice daily.     He was reassured that atrial fibrillation is not life-threatening, but carries an increased risk for stroke.  He has a [id number] score of 4 for age >75, hypertension, and diabetes.  Continue Xarelto 20 mg daily which he takes with his evening meal.  He denies any side effects, bleeding issues, or missed doses.     2.  Hypertension: Blood pressure at target today on current dose of sotalol.  Continue furosemide.     Follow up in 6 months.     History of Present Illness/Subjective    Mr. Campos Ocampo is a 86 y.o. male who comes in today accompanied by his wife for EP follow-up of atrial fibrillation.  He was diagnosed with atrial fibrillation with rapid ventricular response on 12/21/2018, though onset of symptoms predated diagnosis by a couple of months. Symptoms consist of worsened dyspnea on exertion, fatigue, and mild lower extremity edema.   He had a normal stress echo done in August 2018 at which time he was in sinus rhythm.  He was in sinus rhythm at the time of his nuclear stress test in October 2018  which was also negative.   He underwent cardioversion on 2/11/2019, but with early recurrence of A. fib.  He failed antiarrhythmic therapy with flecainide.  He spontaneously converted to sinus rhythm after starting sotalol 80 mg twice daily.  He remains on Xarelto 20 mg daily for stroke prophylaxis.  He has a history of hypertension, hyperlipidemia, type 2 diabetes, severe COPD, and anemia.  He was hospitalized on 1/1/2020 for bilateral pneumonia at which time he was also noted to have a platelet count of 60 for which he is to follow-up with hematology.     Campos states that he has been feeling surprisingly well with regard to the pneumonia.  He continues to have an occasional cough when he is up and about.  He has not had any A. Fib.  He continues to have shortness of breath with climbing stairs and walking longer distances, but states he is able to ride his bike without difficulty.   He denies chest discomfort, palpitations, abdominal fullness/bloating, shortness of breath at rest, paroxysmal nocturnal dyspnea, orthopnea, lightheadedness, dizziness, pre-syncope, or syncope.     Cardiographics (EKGs personally reviewed):  EKG done 1/11/2020 shows sinus rhythm at 65 bpm, QT/QTc interval measures 468/486 ms.  EKG done 4/29/2019 shows sinus rhythm at 64 bpm QT/QTc interval measures 440/453 ms  EKG, Done 12/26/2018 shows coarse atrial fibrillation with controlled ventricular response at 84 bpm  EKG done 12/21/2018 shows atrial fibrillation with rapid ventricular response at 121 bpm  EKG done 7/31/2018 shows sinus rhythm at 66 bpm, QT/QTc interval measures 406/416 ms     24-hour Holter monitor worn 4/19/2019 shows persistent atrial flutter with 2: 1 conduction and average ventricular response of 85 to 100 bpm.     2-week event monitor worn 3/15/2019 to 3/28/2019 showed sinus rhythm with heart rates in the 70s, normal electrocardiographic intervals.  Paroxysmal atrial fibrillation with an estimated burden of 7%, longest  episode being just under 8 hours.  Ventricular rates are controlled between 80 and 100 bpm with short bursts of RVR.  No significant bradycardia or pauses.     Stress Echo done 8/16/2018 shows normal left ventricular systolic performance, LVEF 60-65%, no regional wall motion abnormalities, no significant valvular disease.  Negative for myocardial ischemia or infarction.  Patient was in sinus rhythm     NM stress test done 10/16/2018 is negative for inducible myocardial ischemia or infarction.  LVEF 76%.  Patient in sinus rhythm without ischemic changes or arrhythmias during either stress or recovery.       Physical Examination Review of Systems   Vitals:    01/15/20 0935   BP: 120/60   Pulse: 62   Resp: 14     Body mass index is 23.73 kg/m .  Wt Readings from Last 3 Encounters:   01/15/20 175 lb (79.4 kg)   01/11/20 178 lb 12.8 oz (81.1 kg)   10/04/19 180 lb 4.8 oz (81.8 kg)       General Appearance:   Alert, well-appearing and in no acute distress.   HEENT: Atraumatic, normocephalic.  No scleral icterus, normal conjunctivae, EOMs intact, PERRL.  Mucous membranes pink and moist.     Chest/Lungs:   Chest symmetric, spine straight.  Respirations unlabored.  Lungs are clear to auscultation.   Cardiovascular:   Regular rate and rhythm.  Normal first and second heart sounds with no murmurs, rubs, or gallops; radial and posterior tibial pulses are intact, No edema.   Abdomen:  Soft, nondistended, bowel sounds present.   Extremities: No cyanosis or clubbing.   Musculoskeletal: Moves all extremities.     Skin: Warm, dry, intact.    Neurologic: Mood and affect are appropriate.  Alert and oriented to person, place, time, and situation.    General: WNL  Eyes: WNL  Ears/Nose/Throat: WNL  Lungs: Cough, Shortness of Breath  Heart: Shortness of Breath with activity  Stomach: WNL  Bladder: Frequent Urination at Night  Muscle/Joints: WNL  Skin: WNL  Nervous System: Daytime Sleepiness  Mental Health: WNL     Blood: WNL       Medical  History  Surgical History Family History Social History   Past Medical History:   Diagnosis Date   ? Atrial fibrillation (H)    ? Diabetes mellitus (H)    ? VILLEGAS (dyspnea on exertion) 12/21/2018   ? Hyperlipidemia LDL goal <100 9/6/2018   ? Hypertension    ? Kidney stones    ? Ptosis, left eyelid    ? Severe chronic obstructive pulmonary disease (H) 11/1/2018   ? Shingles    ? Subdural hematoma (H)     traumatic after a fall    Past Surgical History:   Procedure Laterality Date   ? CARDIOVERSION  02/11/2019    nsr x 1 min, then afib   ? CHOLECYSTECTOMY     ? eyelid surgery Left 04/2018   ? KIDNEY STONE SURGERY  2018    Family History   Problem Relation Age of Onset   ? Stroke Mother         cerebral hemorrage   ? Emphysema Father     Social History     Tobacco Use   ? Smoking status: Never Smoker   ? Smokeless tobacco: Never Used   Substance Use Topics   ? Alcohol use: Yes     Alcohol/week: 1.0 standard drinks     Types: 1 Glasses of wine per week   ? Drug use: No          Medications  Allergies   Current Outpatient Medications   Medication Sig Dispense Refill   ? albuterol (PROAIR HFA;PROVENTIL HFA;VENTOLIN HFA) 90 mcg/actuation inhaler Inhale 2 puffs every 4 (four) hours as needed for wheezing or shortness of breath. Use with spacer 1 Inhaler 11   ? cefdinir (OMNICEF) 300 MG capsule Take 1 capsule (300 mg total) by mouth 2 (two) times a day for 5 days. 10 capsule 0   ? cholecalciferol, vitamin D3, 1,000 unit tablet Take 1,000 Units by mouth daily.     ? fluticasone propion-salmeterol (ADVAIR DISKUS) 250-50 mcg/dose DISKUS Inhale 1 puff 2 (two) times a day. 3 each 3   ? fluticasone propionate (FLONASE) 50 mcg/actuation nasal spray 2 sprays into each nostril daily as needed.      ? furosemide (LASIX) 20 MG tablet TAKE 2 TABLETS (40 MG) BY MOUTH ONCE DAILY 180 tablet 3   ? guaiFENesin (ROBITUSSIN) 100 mg/5 mL syrup Take 10 mL (200 mg total) by mouth every 6 (six) hours as needed for cough or congestion.  0   ?  levothyroxine (SYNTHROID, LEVOTHROID) 175 MCG tablet TAKE 1 TABLET BY MOUTH EVERY MORNING AT 6 AM 90 tablet 0   ? memantine (NAMENDA) 5 MG tablet 5 mg daily.  1   ? metFORMIN (GLUCOPHAGE) 500 MG tablet Take 1 tablet (500 mg total) by mouth 2 (two) times a day with meals. 90 tablet 3   ? multivitamin with minerals (THERA-M) 9 mg iron-400 mcg Tab tablet Take 1 tablet by mouth daily.     ? predniSONE (DELTASONE) 20 MG tablet Take 40 mg by mouth daily with breakfast for 3 days. 6 tablet 0   ? rivaroxaban (XARELTO) 20 mg tablet Take 1 tablet (20 mg total) by mouth daily with supper. 90 tablet 3   ? sotalol (BETAPACE) 80 MG tablet Take 1 tablet (80 mg total) by mouth 2 (two) times a day. 180 tablet 3     No current facility-administered medications for this visit.     Allergies   Allergen Reactions   ? Penicillins Unknown         Lab Results    Chemistry/lipid CBC Other   Lab Results   Component Value Date    CREATININE 1.02 01/11/2020    CREATININE 1.02 01/11/2020    BUN 19 01/11/2020     01/11/2020    K 4.2 01/11/2020     01/11/2020    CO2 31 01/11/2020     Creatinine (mg/dL)   Date Value   01/11/2020 1.02   01/11/2020 1.02   08/29/2019 1.20   08/09/2019 1.14       No results found for: CHOL, HDL, LDL Lab Results   Component Value Date    WBC 4.7 01/13/2020    HGB 10.3 (L) 01/13/2020    HCT 31.6 (L) 01/13/2020     (H) 01/13/2020    PLT 60 (L) 01/13/2020    Lab Results   Component Value Date    TROPONINI <0.01 01/11/2020     (H) 08/09/2019    TSH 1.19 08/09/2019     Lab Results   Component Value Date    INR 1.20 (H) 12/21/2018        This note has been dictated using voice recognition software. Any grammatical, typographical, or context distortions are unintentional and inherent to the software.

## 2021-06-29 NOTE — PROGRESS NOTES
Progress Notes by Winsome Davis MD at 9/10/2020  1:30 PM     Author: Winsome Davis MD Service: -- Author Type: Physician    Filed: 9/10/2020  2:21 PM Encounter Date: 9/10/2020 Status: Signed    : Winsome Davis MD (Physician)         HEART CARE NOTE          Assessment/Recommendations     1. HFpEF c/b ADHF  Assessment / Plan    Denies orthopnea, PND; noted to have 1-2+ LE on physical exam; will increase bumex to 2mg two times a day and repeat lab work in 1 week.     Continues to have breathing and fatigue    Patient's is s/p abdominal paracentesis yesterday and abdominal distension is significantly reduced; he currently gets an abdominal US and paracentesis q 2 weeks     2. Chronic afib  Assessment / Plan    Follows with afib clinic    Currently on sotalol and Xarelto     3. DM2  Assessment / Plan    Management per PMD    Currently on metformin     4. Hypothyroidism  Assessment / Plan    Currently on levothyroxine    History of Present Illness/Subjective      Mr. Campos Ocampo is a 86 y.o. male with a PMHx significant for HTN, chronic A. fib anticoagulated on Xarelto, HLD, hypothyroidism, DMT2, COPD, and CHF who presents to [facility] for follow-up visit.     Today, Mr. Ocampo endorses persistent dyspnea with exertion and reports poor functional capacity. He denies orthopnea or PND while endorsing mild edema and progressive abdominal distension.      ECG: Personally reviewed. unchanged from previous tracings, normal sinus rhythm.     ECHO (personnaly Reviewed):     Left Ventricle: Left ventricle not well visualized. Normal left ventricular systolic function.The estimated left ventricular ejection fraction is 60%. This represents a normal ejection fraction. Unable to assess diastolic function given technical limitations and lack of apical views.    The left ventricular wall motion is normal.    Right Ventricle: The right ventricle is mildly dilated. Right ventricle  wall thickness is normal.    IVC: Normal central venous pressure. Estimated central venous pressure equal to 3 mmHg.    No previous study for comparison.          Physical Examination Review of Systems   Vitals:    09/10/20 1355   BP: 92/62   Pulse: 64   Resp: 16     Body mass index is 21.42 kg/m .  Wt Readings from Last 3 Encounters:   09/10/20 158 lb (71.7 kg)   08/18/20 162 lb (73.5 kg)   08/06/20 162 lb (73.5 kg)     General Appearance:   no distress, normal body habitus   ENT/Mouth: membranes moist, no oral lesions or bleeding gums.      EYES:  no scleral icterus, normal conjunctivae   Neck: no carotid bruits or thyromegaly   Chest/Lungs:   lungs are clear to auscultation, no rales or wheezing, equal chest wall expansion    Cardiovascular:   Irregular. Normal first and second heart sounds with no murmurs, rubs, or gallops; the carotid, radial and posterior tibial pulses are intact, + JVD and 1-2+ edema bilaterally    Abdomen:  no organomegaly, masses, bruits, or tenderness; bowel sounds are present   Extremities: no cyanosis or clubbing   Skin: no xanthelasma, warm.    Neurologic: normal gait, normal  bilateral, no tremors     Psychiatric: alert and oriented x3, calm     A complete 10 systems ROS was reviewed  And is negative except what is listed in the HPI.          Medical History  Surgical History Family History Social History   Past Medical History:   Diagnosis Date   ? Atrial fibrillation (H)    ? CHF (congestive heart failure) (H)    ? Diabetes mellitus (H)    ? VILLEGAS (dyspnea on exertion) 12/21/2018   ? Hyperlipidemia LDL goal <100 9/6/2018   ? Hypertension    ? Kidney stones    ? Ptosis, left eyelid    ? Severe chronic obstructive pulmonary disease (H) 11/1/2018   ? Shingles    ? Subdural hematoma (H)     traumatic after a fall    Past Surgical History:   Procedure Laterality Date   ? CARDIOVERSION  02/11/2019    nsr x 1 min, then afib   ? CHOLECYSTECTOMY     ? eyelid surgery Left 04/2018   ? KIDNEY  STONE SURGERY  2018   ?  PARACENTESIS  5/27/2020   ?  PARACENTESIS  7/16/2020   ?  PARACENTESIS  7/27/2020   ?  PARACENTESIS  8/17/2020   ?  PARACENTESIS  8/26/2020   ?  PARACENTESIS  9/9/2020    no family history of premature coronary artery disease Social History     Socioeconomic History   ? Marital status:      Spouse name: Not on file   ? Number of children: 4   ? Years of education: Not on file   ? Highest education level: Not on file   Occupational History   ? Not on file   Social Needs   ? Financial resource strain: Not on file   ? Food insecurity     Worry: Not on file     Inability: Not on file   ? Transportation needs     Medical: Not on file     Non-medical: Not on file   Tobacco Use   ? Smoking status: Never Smoker   ? Smokeless tobacco: Never Used   Substance and Sexual Activity   ? Alcohol use: Yes     Alcohol/week: 1.0 standard drinks     Types: 1 Glasses of wine per week     Comment: rare   ? Drug use: No   ? Sexual activity: Never   Lifestyle   ? Physical activity     Days per week: Not on file     Minutes per session: Not on file   ? Stress: Not on file   Relationships   ? Social connections     Talks on phone: Not on file     Gets together: Not on file     Attends Sikh service: Not on file     Active member of club or organization: Not on file     Attends meetings of clubs or organizations: Not on file     Relationship status: Not on file   ? Intimate partner violence     Fear of current or ex partner: Not on file     Emotionally abused: Not on file     Physically abused: Not on file     Forced sexual activity: Not on file   Other Topics Concern   ? Not on file   Social History Narrative    Has 4 sons. Has 7 grandchildren. Has 1 great grandchild.     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     Has been  63 years in 2018.      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            Lab Results    Chemistry/lipid CBC Cardiac Enzymes/BNP/TSH/INR   Lab Results   Component Value Date    CREATININE 1.91 (H) 08/19/2020    BUN 43 (H) 08/19/2020    K 4.1 08/19/2020     08/19/2020    CL 97 (L) 08/19/2020    CO2 32 (H) 08/19/2020    Lab Results   Component Value Date    WBC 5.8 07/10/2020    HGB 11.3 (L) 07/10/2020    HCT 34.2 (L) 07/10/2020     (H) 07/10/2020     (L) 07/10/2020    Lab Results   Component Value Date    TROPONINI <0.01 05/26/2020     (H) 06/25/2020    TSH 2.44 02/12/2020    INR 1.20 (H) 12/21/2018     Lab Results   Component Value Date    TROPONINI <0.01 05/26/2020          Weight:    Wt Readings from Last 3 Encounters:   09/10/20 158 lb (71.7 kg)   08/18/20 162 lb (73.5 kg)   08/06/20 162 lb (73.5 kg)       Allergies  Allergies   Allergen Reactions   ? Penicillins Unknown     Tolerated CTX Jan 2020         Surgical History  Past Surgical History:   Procedure Laterality Date   ? CARDIOVERSION  02/11/2019    nsr x 1 min, then afib   ? CHOLECYSTECTOMY     ? eyelid surgery Left 04/2018   ? KIDNEY STONE SURGERY  2018   ? US PARACENTESIS  5/27/2020   ? US PARACENTESIS  7/16/2020   ?  PARACENTESIS  7/27/2020   ?  PARACENTESIS  8/17/2020   ?  PARACENTESIS  8/26/2020   ?  PARACENTESIS  9/9/2020       Social History  Tobacco:   Social History     Tobacco Use   Smoking Status Never Smoker   Smokeless Tobacco Never Used    Alcohol:   Social History     Substance and Sexual Activity   Alcohol Use Yes   ? Alcohol/week: 1.0 standard drinks   ? Types: 1 Glasses of wine per week    Comment: rare    Illicit Drugs:   Social History     Substance and Sexual Activity   Drug Use No       Family History  Family History   Problem Relation Age of Onset   ? Stroke Mother         cerebral hemorrage   ? Emphysema Father           Kim Davis on 9/10/2020      cc: Sandro Parker MD,

## 2021-06-29 NOTE — PROGRESS NOTES
"Progress Notes by Ashley Hutchinson CNP at 6/3/2020  2:10 PM     Author: Ashley Hutchinson CNP Service: -- Author Type: Nurse Practitioner    Filed: 6/3/2020  2:56 PM Encounter Date: 6/3/2020 Status: Signed    : Ashley Hutchinson CNP (Nurse Practitioner)           The patient has been notified of following:     \"This telephone visit will be conducted via a call between you and your physician/provider. We have found that certain health care needs can be provided without the need for a physical exam.  This service lets us provide the care you need with a phone conversation.  If a prescription is necessary we can send it directly to your pharmacy.  If lab work is needed we can place an order for that and you can then stop by our lab to have the test done at a later time. If during the course of the call the physician/provider feels a telephone visit is not appropriate, you will not be charged for this service.\" Verbal consent has been obtained for this service by care team member:         HEART CARE PHONE ENCOUNTER        The patient has chosen to have the visit conducted as a telephone visit, to reduce risk of exposure given the current status of Coronavirus in our community. This telephone visit is being conducted via a call between the patient and physician/provider. Health care needs are being provided without a physical exam.     Assessment/Recommendations   Assessment and Plan:    1.  Persistent atrial fibrillation and flutter:  No symptomatology or evidence of recurrence of AF despite acute heart failure and severe emphysema, now requiring nocturnal oxygen.  We reviewed the physiology and natural progression of atrial fibrillation as well as treatment options of rate control versus rhythm control depending upon the presence of symptoms.  Antiarrhythmic medications are limited due to his COPD and he is not a good candidate for ablation.    He wife maintains that he is asymptomatic.  We will reassess symptom status " when he has recurrence of A. fib.  QTc interval remains within safety parameters.  Continue sotalol 80 mg twice daily.       He was reassured that atrial fibrillation is not life-threatening, but carries an increased risk for stroke.  He has a [id number] score of 4 for age >75, hypertension, and diabetes.  Continue Xarelto 20 mg daily which he takes with his evening meal.  He denies any side effects, bleeding issues, or missed doses.  Hemoglobin and platelets have remained stable.     2.  Hypertension: Blood pressure on the lower side, but asymptomatic.  He was instructed to call if his systolic blood pressures consistently <90.  Continue sotalol, Bumex, and Spironolactone.    3.  Heart failure with preserved ejection fraction, NYHA class 3: LVEF 60%.  Short of breath with minimal activity, but denies signs of fluid retention or heart failure symptoms.  Weight is stable.  Continue Bumex and Spironolactone.  Could consider decreasing sotalol if blood pressure cannot tolerate adequate diuretics.    Follow Up Plan: Follow up with Jennifer Nye CNP on 6/12/2020  Arrhythmia follow up in 6 months  I have reviewed the note as documented.  This accurately captures the substance of my conversation with the patient.    Total time of call between patient and provider was 13 minutes   Start Time:1411   Stop Time:1424       History of Present Illness/Subjective    Campos Ocampo is a 86 y.o. male who is being evaluated via a billable telephone visit.  He has a history of atrial fibrillation, heart failure with preserved ejection fraction, hypertension, hyperlipidemia, type 2 diabetes, severe COPD, hypothyroidism, chronic anemia, and chronic thrombocytopenia.  He was diagnosed with atrial fibrillation with rapid ventricular response on 12/21/2018, though onset of symptoms predated diagnosis by a couple of months. Symptoms consist of worsened dyspnea on exertion, fatigue, and mild lower extremity edema.  He underwent  cardioversion on 2/11/2019, but with early recurrence of A. fib.  He failed antiarrhythmic therapy with flecainide.  He spontaneously converted to sinus rhythm after starting sotalol 80 mg twice daily.  He remains on Xarelto 20 mg daily for stroke prophylaxis.      Campos was hospitalized twice in May for acute heart failure with preserved ejection fraction.  In his second hospitalization he underwent paracentesis with removal of 6 L.  He is also started wearing oxygen at night.  Of note, it appears he did not have any A. fib during his hospitalizations.  He states he cannot remember the last time he had an episode of A. Fib.  He continues to have  dyspnea with minimal exertion.   He denies chest discomfort, palpitations, abdominal fullness/bloating, shortness of breath at rest, paroxysmal nocturnal dyspnea, orthopnea, lightheadedness, dizziness, pre-syncope, or syncope. He states that lower extremity edema is very mild.  His blood pressure was low today with SBP 89, but asymptomatic.  He states his systolic blood pressure is usually in the 90s.  His weights have been stable, 173 pounds today.     Cardiographics (EKGs personally reviewed):  EKG done 5/27/2020 shows sinus rhythm at 65 bpm, QT/QTc interval measures 460/478 ms.  EKGs from May 2020 all show sinus rhythm  EKG, Done 12/26/2018 shows coarse atrial fibrillation with controlled ventricular response at 84 bpm  EKG done 12/21/2018 shows atrial fibrillation with rapid ventricular response at 121 bpm  EKG done 7/31/2018 shows sinus rhythm at 66 bpm, QT/QTc interval measures 406/416 ms     24-hour Holter monitor worn 4/19/2019 shows persistent atrial flutter with 2: 1 conduction and average ventricular response of 85 to 100 bpm.     2-week event monitor worn 3/15/2019 to 3/28/2019 showed sinus rhythm with heart rates in the 70s, normal electrocardiographic intervals.  Paroxysmal atrial fibrillation with an estimated burden of 7%, longest episode being just under  8 hours.  Ventricular rates are controlled between 80 and 100 bpm with short bursts of RVR.  No significant bradycardia or pauses.    Echo done 5/12/2020:    Left Ventricle: Left ventricle not well visualized. Normal left ventricular systolic function.The estimated left ventricular ejection fraction is 60%. This represents a normal ejection fraction. Unable to assess diastolic function given technical limitations and lack of apical views.    The left ventricular wall motion is normal.    Right Ventricle: The right ventricle is mildly dilated. Right ventricle wall thickness is normal.    IVC: Normal central venous pressure. Estimated central venous pressure equal to 3 mmHg.    No previous study for comparison.     Technically very challenging study.  No interpretable parasternal or apical views obtained.  On subxiphoid views, the left ventricular systolic function is within normal limits.  No regional wall motion normalities noted but cannot exclude the possibility of a small regional wall motion normality.  Right ventricular systolic function appears to be at the lower limit of normal and there may be mild right ventricular enlargement.  Valvular function is difficult to evaluate but no severe valvular issues documented.     Stress Echo done 8/16/2018 shows normal left ventricular systolic performance, LVEF 60-65%, no regional wall motion abnormalities, no significant valvular disease.  Negative for myocardial ischemia or infarction.  Patient was in sinus rhythm     NM stress test done 10/16/2018 is negative for inducible myocardial ischemia or infarction.  LVEF 76%.  Patient in sinus rhythm without ischemic changes or arrhythmias during either stress or recovery.    I have reviewed and updated the patient's Past Medical History, Social History, Family History and Medication List.     Physical Examination not performed given phone encounter Review of Systems          See CMA note attached, personally reviewed.                                       Medical History  Surgical History Family History Social History   Past Medical History:   Diagnosis Date   ? Atrial fibrillation (H)    ? CHF (congestive heart failure) (H)    ? Diabetes mellitus (H)    ? VILLEGAS (dyspnea on exertion) 12/21/2018   ? Hyperlipidemia LDL goal <100 9/6/2018   ? Hypertension    ? Kidney stones    ? Ptosis, left eyelid    ? Severe chronic obstructive pulmonary disease (H) 11/1/2018   ? Shingles    ? Subdural hematoma (H)     traumatic after a fall    Past Surgical History:   Procedure Laterality Date   ? CARDIOVERSION  02/11/2019    nsr x 1 min, then afib   ? CHOLECYSTECTOMY     ? eyelid surgery Left 04/2018   ? KIDNEY STONE SURGERY  2018   ? US PARACENTESIS  5/27/2020    Family History   Problem Relation Age of Onset   ? Stroke Mother         cerebral hemorrage   ? Emphysema Father     Social History     Socioeconomic History   ? Marital status:      Spouse name: Not on file   ? Number of children: 4   ? Years of education: Not on file   ? Highest education level: Not on file   Occupational History   ? Not on file   Social Needs   ? Financial resource strain: Not on file   ? Food insecurity     Worry: Not on file     Inability: Not on file   ? Transportation needs     Medical: Not on file     Non-medical: Not on file   Tobacco Use   ? Smoking status: Never Smoker   ? Smokeless tobacco: Never Used   Substance and Sexual Activity   ? Alcohol use: Yes     Alcohol/week: 1.0 standard drinks     Types: 1 Glasses of wine per week     Comment: rare   ? Drug use: No   ? Sexual activity: Never   Lifestyle   ? Physical activity     Days per week: Not on file     Minutes per session: Not on file   ? Stress: Not on file   Relationships   ? Social connections     Talks on phone: Not on file     Gets together: Not on file     Attends Jew service: Not on file     Active member of club or organization: Not on file     Attends meetings of clubs or organizations:  Not on file     Relationship status: Not on file   ? Intimate partner violence     Fear of current or ex partner: Not on file     Emotionally abused: Not on file     Physically abused: Not on file     Forced sexual activity: Not on file   Other Topics Concern   ? Not on file   Social History Narrative    Has 4 sons. Has 7 grandchildren. Has 1 great grandchild.     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     Has been  63 years in 2018.     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           Medications  Allergies   Current Outpatient Medications   Medication Sig Dispense Refill   ? albuterol (PROAIR HFA;PROVENTIL HFA;VENTOLIN HFA) 90 mcg/actuation inhaler Inhale 2 puffs every 4 (four) hours as needed for wheezing or shortness of breath. Use with spacer 1 Inhaler 11   ? bumetanide (BUMEX) 1 MG tablet Take 1 tablet (1 mg total) by mouth 2 (two) times a day. 60 tablet 11   ? cholecalciferol, vitamin D3, 1,000 unit tablet Take 1,000 Units by mouth daily.     ? fluticasone propion-salmeterol (ADVAIR DISKUS) 250-50 mcg/dose DISKUS Inhale 1 puff 2 (two) times a day. 3 each 3   ? levothyroxine (SYNTHROID, LEVOTHROID) 175 MCG tablet TAKE 1 TABLET BY MOUTH EVERY MORNING AT 6 AM 90 tablet 0   ? memantine (NAMENDA) 5 MG tablet Take 5 mg by mouth at bedtime.   1   ? metFORMIN (GLUCOPHAGE) 500 MG tablet Take 1 tablet (500 mg total) by mouth 2 (two) times a day with meals. 180 tablet 1   ? multivitamin with minerals (THERA-M) 9 mg iron-400 mcg Tab tablet Take 1 tablet by mouth daily.     ? sotaloL (BETAPACE) 80 MG tablet Take 1 tablet (80 mg total) by mouth 2 (two) times a day. 180 tablet 3   ? spironolactone (ALDACTONE) 25 MG tablet Take 1 tablet (25 mg total) by mouth daily. 30 tablet 11   ? XARELTO 20 mg tablet TAKE 1 TABLET DAILY WITH SUPPER 90 tablet 1   ? magnesium oxide 400 mg magnesium  Tab Take 1 tablet by mouth every evening.       No current facility-administered medications for this visit.     Allergies   Allergen Reactions   ? Penicillins Unknown     Tolerated CTX Jan 2020         Lab Results    Chemistry/lipid CBC Cardiac Enzymes/BNP/TSH/INR   Lab Results   Component Value Date    CREATININE 1.20 06/01/2020    BUN 25 06/01/2020    K 4.1 06/01/2020     06/01/2020    CL 95 (L) 06/01/2020    CO2 35 (H) 06/01/2020    Lab Results   Component Value Date    WBC 5.0 05/28/2020    HGB 10.2 (L) 05/28/2020    HCT 29.4 (L) 05/28/2020    MCV 99 05/28/2020     (L) 05/28/2020    Lab Results   Component Value Date    TROPONINI <0.01 05/26/2020     (H) 05/26/2020    TSH 2.44 02/12/2020    INR 1.20 (H) 12/21/2018        Ashley Hutchinson CNP  Cardiac Electrophysiology

## 2021-06-29 NOTE — PROGRESS NOTES
Progress Notes by Stephanie Roberts CNP at 7/9/2020  9:50 AM     Author: Stephanie Roberts CNP Service: -- Author Type: Nurse Practitioner    Filed: 7/9/2020 11:56 AM Encounter Date: 7/9/2020 Status: Signed    : Stephanie Roberts CNP (Nurse Practitioner)             Assessment/Recommendations   Assessment:    1.  Heart failure with preserved ejection fraction, NYHA class III: He states he continues to have dyspnea on exertion, abdominal distention, and mild edema.  His symptoms have been present for months and he is very frustrated with his health.  He has been hospitalized 4 times since beginning of the year. He briefly discussed meeting with palliative care and purpose of meeting with their speciality. He states his weight has been stable since discharge from the hospital.  He is strict on a low-sodium diet.  We discussed meeting with palliative care due to symptoms and multiple hospitalizations.  He had a paracentesis the end of May and had 6 L removed.  He states his symptoms improved.  We did discuss therapeutic paracentesis as an option for him.    2.  Hypertension: Controlled.  Blood pressure 116/62    3.  Persistent atrial fibrillation: He continues Xarelto for anticoagulation. He continues sotalol.    Plan:  1.  Ultrasound guided paracentesis scheduled July 16  2.  Continue current medications  3.  Referral to palliative care  4.  Continue daily weights and low sodium diet  5.  Continue compression socks    Campos Ocampo will follow up with Dr Davis in 1 month and in the heart failure clinic in 1-2 weeks.     History of Present Illness/Subjective    Mr. Campos Ocampo is a 86 y.o. male seen at [facility] heart failure clinic today for continued follow-up.  His wife accompanies him today.  He follows up for heart failure preserved ejection fraction.  He was recently hospitalized the end of June for acute heart failure and was diuresed.  His symptoms improved while  hospitalized.  Over the last week and a half his symptoms have worsened including dyspnea on exertion and abdominal distention.  He is very frustrated with his health and symptoms that have been present the last months.  His last echocardiogram was done in May which showed an ejection fraction of 60% and RV mildly dilated.  He underwent paracentesis the end of May and had 6 L removed.  States today his symptoms improved.  He has a past medical history significant for COPD, hypertension, hyperlipidemia, diabetes, atrial fibrillation. He lives with his wife at [facility].    Today he complains of dyspnea exertion and abdominal distention.  She also has mild lower extremity edema.  He does wear compression socks.  He wears nocturnal oxygen at 2 L per nasal cannula.  Symptoms have worsened over the last week and a half since discharge from the hospital. He denies lightheadedness, orthopnea, PND, palpitations and chest pain.      He is monitoring home weights which are stable around 175 pounds.  He is following a low sodium diet.      ECHOCARDIOGRAM:      Physical Examination Review of Systems   Vitals:    07/09/20 0956   BP: 116/62   Pulse: 62   Resp: 20   SpO2: (!) 88%     Body mass index is 24.17 kg/m .  Wt Readings from Last 3 Encounters:   07/09/20 178 lb 3.2 oz (80.8 kg)   06/29/20 175 lb (79.4 kg)   06/27/20 173 lb (78.5 kg)       General Appearance:   no acute distress   ENT/Mouth: membranes moist, no oral lesions or bleeding gums.      EYES:  no scleral icterus, normal conjunctivae   Neck: no carotid bruits or thyromegaly   Chest/Lungs:   lungs are clear to auscultation, no rales or wheezing, equal chest wall expansion    Cardiovascular:   Regular. Normal first and second heart sounds with no murmurs, rubs, or gallops; Jugular venous pressure normal, 1+ edema bilaterally, wearing compression socks   Abdomen:  Distended, hernia noted, no bruits, or tenderness; bowel sounds are present   Extremities: no  cyanosis or clubbing   Skin: no xanthelasma, warm.    Neurologic: normal  bilateral, no tremors     Psychiatric: alert and oriented x3    General: WNL  Eyes: WNL  Ears/Nose/Throat: WNL  Lungs: WNL  Heart: WNL  Stomach: WNL  Bladder: WNL  Muscle/Joints: WNL  Skin: WNL  Nervous System: WNL  Mental Health: WNL     Blood: WNL     Medical History  Surgical History Family History Social History   Past Medical History:   Diagnosis Date   ? Atrial fibrillation (H)    ? CHF (congestive heart failure) (H)    ? Diabetes mellitus (H)    ? VILLEGAS (dyspnea on exertion) 12/21/2018   ? Hyperlipidemia LDL goal <100 9/6/2018   ? Hypertension    ? Kidney stones    ? Ptosis, left eyelid    ? Severe chronic obstructive pulmonary disease (H) 11/1/2018   ? Shingles    ? Subdural hematoma (H)     traumatic after a fall    Past Surgical History:   Procedure Laterality Date   ? CARDIOVERSION  02/11/2019    nsr x 1 min, then afib   ? CHOLECYSTECTOMY     ? eyelid surgery Left 04/2018   ? KIDNEY STONE SURGERY  2018   ? US PARACENTESIS  5/27/2020    Family History   Problem Relation Age of Onset   ? Stroke Mother         cerebral hemorrage   ? Emphysema Father     Social History     Socioeconomic History   ? Marital status:      Spouse name: Not on file   ? Number of children: 4   ? Years of education: Not on file   ? Highest education level: Not on file   Occupational History   ? Not on file   Social Needs   ? Financial resource strain: Not on file   ? Food insecurity     Worry: Not on file     Inability: Not on file   ? Transportation needs     Medical: Not on file     Non-medical: Not on file   Tobacco Use   ? Smoking status: Never Smoker   ? Smokeless tobacco: Never Used   Substance and Sexual Activity   ? Alcohol use: Yes     Alcohol/week: 1.0 standard drinks     Types: 1 Glasses of wine per week     Comment: rare   ? Drug use: No   ? Sexual activity: Never   Lifestyle   ? Physical activity     Days per week: Not on file     Minutes  per session: Not on file   ? Stress: Not on file   Relationships   ? Social connections     Talks on phone: Not on file     Gets together: Not on file     Attends Jain service: Not on file     Active member of club or organization: Not on file     Attends meetings of clubs or organizations: Not on file     Relationship status: Not on file   ? Intimate partner violence     Fear of current or ex partner: Not on file     Emotionally abused: Not on file     Physically abused: Not on file     Forced sexual activity: Not on file   Other Topics Concern   ? Not on file   Social History Narrative    Has 4 sons. Has 7 grandchildren. Has 1 great grandchild.     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     Has been  63 years in 2018.     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           Medications  Allergies   Current Outpatient Medications   Medication Sig Dispense Refill   ? albuterol (PROAIR HFA;PROVENTIL HFA;VENTOLIN HFA) 90 mcg/actuation inhaler Inhale 2 puffs every 4 (four) hours as needed for wheezing or shortness of breath. Use with spacer 1 Inhaler 11   ? bumetanide (BUMEX) 1 MG tablet Take 1 tablet (1 mg total) by mouth 2 (two) times a day. 60 tablet 11   ? cholecalciferol, vitamin D3, 1,000 unit tablet Take 1,000 Units by mouth daily.     ? fluticasone propion-salmeterol (ADVAIR DISKUS) 250-50 mcg/dose DISKUS Inhale 1 puff 2 (two) times a day. 3 each 3   ? levothyroxine (SYNTHROID, LEVOTHROID) 175 MCG tablet TAKE 1 TABLET BY MOUTH EVERY MORNING AT 6 AM 90 tablet 0   ? magnesium oxide (MAGOX) 400 mg (241.3 mg magnesium) tablet Take 1 tablet (400 mg total) by mouth 2 (two) times a day. (Patient taking differently: Take 400 mg by mouth daily. ) 200 tablet 1   ? memantine (NAMENDA) 5 MG tablet Take 5 mg by mouth at bedtime.   1   ? metFORMIN (GLUCOPHAGE) 500 MG tablet Take 1  tablet (500 mg total) by mouth 2 (two) times a day with meals. 180 tablet 1   ? multivitamin with minerals (THERA-M) 9 mg iron-400 mcg Tab tablet Take 1 tablet by mouth daily.     ? sotaloL (BETAPACE) 80 MG tablet Take 1 tablet (80 mg total) by mouth 2 (two) times a day. 180 tablet 3   ? spironolactone (ALDACTONE) 25 MG tablet Take 1 tablet (25 mg total) by mouth daily. 30 tablet 11   ? XARELTO 20 mg tablet TAKE 1 TABLET DAILY WITH SUPPER 90 tablet 1     No current facility-administered medications for this visit.     Allergies   Allergen Reactions   ? Penicillins Unknown     Tolerated CTX Jan 2020         Lab Results    Chemistry/lipid CBC Cardiac Enzymes/BNP/TSH/INR   Lab Results   Component Value Date    CREATININE 1.67 (H) 06/27/2020    BUN 33 (H) 06/27/2020    K 3.8 06/27/2020     06/27/2020    CL 98 06/27/2020    CO2 34 (H) 06/27/2020    Lab Results   Component Value Date    WBC 4.9 06/25/2020    HGB 10.5 (L) 06/25/2020    HCT 30.8 (L) 06/25/2020     (H) 06/25/2020     (L) 06/25/2020    Lab Results   Component Value Date    TROPONINI <0.01 05/26/2020     (H) 06/25/2020    TSH 2.44 02/12/2020    INR 1.20 (H) 12/21/2018             This note has been dictated using voice recognition software. Any grammatical, typographical, or context distortions are unintentional and inherent to the software

## 2021-06-29 NOTE — PROGRESS NOTES
Progress Notes by Jennifer Hernandez CNP at 10/12/2020  2:10 PM     Author: Jennifer Hernandez CNP Service: -- Author Type: Nurse Practitioner    Filed: 10/12/2020  2:50 PM Encounter Date: 10/12/2020 Status: Signed    : Jennifer Hernandez CNP (Nurse Practitioner)             Assessment/Recommendations   Assessment:    1.  Heart failure with preserved ejection fraction, NYHA class IV: Bumex was increased in September due to lower extremity edema but renal function worsened.  Bumex was decreased to 2 mg in the morning and 1 mg in the afternoon.  He continues to have shortness of breath and edema.  He has abdominal bloating and has paracentesis every 2 weeks.    2.  Chronic atrial fibrillation: Continue Xarelto and sotalol.    Plan:  1.  Continue current medications  2.  BMP pending  3.  Paracentesis scheduled in 2 weeks at [facility] (no availability at [facility]) and again on November 3 at [facility]    I will discuss follow-up plan with Stephanie Roberts who sees him more regularly.       History of Present Illness/Subjective    Mr. Campos Ocampo is a 87 y.o. male seen at [facility] today for continued follow-up.  He has a history of heart failure with preserved ejection fraction, chronic atrial fibrillation, dyslipidemia, hypertension, diabetes, and COPD.  Echocardiogram from May 2020 showed ejection fraction of 60%.  He has a paracentesis every 2 weeks and his last one was on October 6, 2020.    He has chronic shortness of breath which is at baseline.  He is fatigued at all times.  Lower extremity edema is stable.  He wears compression stockings but did not have them on today.  He denies lightheadedness and chest pain.      His home weight is around 152 pounds after paracentesis and slowly increases to 162 pounds prior to paracentesis.  He has a decreased appetite.      ECHO:   Results for orders placed during the hospital encounter of 05/11/20   Echo  Complete [ECH10] 05/12/2020    Narrative   Left Ventricle: Left ventricle not well visualized. Normal left   ventricular systolic function.The estimated left ventricular ejection   fraction is 60%. This represents a normal ejection fraction. Unable to   assess diastolic function given technical limitations and lack of apical   views.    The left ventricular wall motion is normal.    Right Ventricle: The right ventricle is mildly dilated. Right ventricle   wall thickness is normal.    IVC: Normal central venous pressure. Estimated central venous pressure   equal to 3 mmHg.    No previous study for comparison.     Technically very challenging study.  No interpretable parasternal or   apical views obtained.  On subxiphoid views, the left ventricular systolic   function is within normal limits.  No regional wall motion normalities   noted but cannot exclude the possibility of a small regional wall motion   normality.  Right ventricular systolic function appears to be at the lower   limit of normal and there may be mild right ventricular enlargement.    Valvular function is difficult to evaluate but no severe valvular issues   documented.    Would consider transesophageal echocardiogram or cardiac MRI of further   structural information would be clinically useful.          Physical Examination Review of Systems   Vitals:    10/12/20 1416   BP: 90/50   Pulse: (!) 56   Resp: 10     Body mass index is 20.71 kg/m .  Wt Readings from Last 3 Encounters:   10/12/20 157 lb (71.2 kg)   09/17/20 163 lb (73.9 kg)   09/10/20 158 lb (71.7 kg)       General Appearance:     Alert, cooperative and in no acute distress.   ENT/Mouth: membranes moist, no oral lesions or bleeding gums.      EYES:  no scleral icterus, normal conjunctivae   Chest/Lungs:   lungs are clear to auscultation, decreased   Cardiovascular:   Normal first and second heart sounds, 2+ edema bilateral lower extremities    Abdomen:   Distended, bowel sounds present    Extremities: no cyanosis or clubbing   Skin: warm, dry.    Neurologic: mood and affect are appropriate, alert and oriented x3      General: WNL  Eyes: WNL  Ears/Nose/Throat: WNL  Lungs: Shortness of Breath  Heart: Shortness of Breath with activity  Stomach: WNL  Bladder: WNL  Muscle/Joints: WNL  Skin: WNL  Nervous System: Falls, Loss of Balance  Mental Health: WNL     Blood: WNL     Medical History  Surgical History Family History Social History   Past Medical History:   Diagnosis Date   ? Atrial fibrillation (H)    ? CHF (congestive heart failure) (H)    ? Diabetes mellitus (H)    ? VILLEGAS (dyspnea on exertion) 12/21/2018   ? Hyperlipidemia LDL goal <100 9/6/2018   ? Hypertension    ? Kidney stones    ? Ptosis, left eyelid    ? Severe chronic obstructive pulmonary disease (H) 11/1/2018   ? Shingles    ? Subdural hematoma (H)     traumatic after a fall    Past Surgical History:   Procedure Laterality Date   ? CARDIOVERSION  02/11/2019    nsr x 1 min, then afib   ? CHOLECYSTECTOMY     ? eyelid surgery Left 04/2018   ? KIDNEY STONE SURGERY  2018   ? US PARACENTESIS  5/27/2020   ? US PARACENTESIS  7/16/2020   ? US PARACENTESIS  7/27/2020   ? US PARACENTESIS  8/17/2020   ? US PARACENTESIS  8/26/2020   ? US PARACENTESIS  9/9/2020   ? US PARACENTESIS WITH ALBUMIN  9/23/2020   ? US PARACENTESIS WITH ALBUMIN  10/6/2020    Family History   Problem Relation Age of Onset   ? Stroke Mother         cerebral hemorrage   ? Emphysema Father     Social History     Socioeconomic History   ? Marital status:      Spouse name: Not on file   ? Number of children: 4   ? Years of education: Not on file   ? Highest education level: Not on file   Occupational History   ? Not on file   Social Needs   ? Financial resource strain: Not on file   ? Food insecurity     Worry: Not on file     Inability: Not on file   ? Transportation needs     Medical: Not on file     Non-medical: Not on file   Tobacco Use   ? Smoking status: Never Smoker   ?  Smokeless tobacco: Never Used   Substance and Sexual Activity   ? Alcohol use: Yes     Alcohol/week: 1.0 standard drinks     Types: 1 Glasses of wine per week     Comment: rare   ? Drug use: No   ? Sexual activity: Never   Lifestyle   ? Physical activity     Days per week: Not on file     Minutes per session: Not on file   ? Stress: Not on file   Relationships   ? Social connections     Talks on phone: Not on file     Gets together: Not on file     Attends Jehovah's witness service: Not on file     Active member of club or organization: Not on file     Attends meetings of clubs or organizations: Not on file     Relationship status: Not on file   ? Intimate partner violence     Fear of current or ex partner: Not on file     Emotionally abused: Not on file     Physically abused: Not on file     Forced sexual activity: Not on file   Other Topics Concern   ? Not on file   Social History Narrative    Has 4 sons. Has 7 grandchildren. Has 1 great grandchild.     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     Has been  63 years in 2018.     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           Medications  Allergies   Current Outpatient Medications   Medication Sig Dispense Refill   ? albuterol (PROAIR HFA;PROVENTIL HFA;VENTOLIN HFA) 90 mcg/actuation inhaler Inhale 2 puffs every 4 (four) hours as needed for wheezing or shortness of breath. Use with spacer 1 Inhaler 11   ? bumetanide (BUMEX) 1 MG tablet 2mg in the AM and 1mg in the PM 60 tablet 11   ? cholecalciferol, vitamin D3, 1,000 unit tablet Take 1,000 Units by mouth daily.     ? fluticasone propion-salmeteroL (ADVAIR DISKUS) 250-50 mcg/dose DISKUS Inhale 1 puff 2 (two) times a day. 3 each 3   ? levothyroxine (SYNTHROID, LEVOTHROID) 175 MCG tablet TAKE 1 TABLET BY MOUTH EVERY MORNING AT 6 AM 90 tablet 0   ? magnesium oxide (MAGOX) 400 mg  (241.3 mg magnesium) tablet Take 1 tablet (400 mg total) by mouth 2 (two) times a day. (Patient taking differently: Take 400 mg by mouth daily. ) 200 tablet 1   ? memantine (NAMENDA) 5 MG tablet Take 5 mg by mouth at bedtime.   1   ? metFORMIN (GLUCOPHAGE) 500 MG tablet Take 1 tablet (500 mg) by mouth 2 times a day with meals. 180 tablet 0   ? multivitamin with minerals (THERA-M) 9 mg iron-400 mcg Tab tablet Take 1 tablet by mouth daily.     ? rivaroxaban ANTICOAGULANT (XARELTO) 20 mg tablet TAKE 1 TABLET DAILY WITH SUPPER 90 tablet 3   ? sotaloL (BETAPACE) 80 MG tablet Take 1 tablet (80 mg total) by mouth 2 (two) times a day. 180 tablet 3     No current facility-administered medications for this visit.     Allergies   Allergen Reactions   ? Penicillins Unknown     Tolerated CTX Jan 2020         Lab Results    Chemistry/lipid CBC Cardiac Enzymes/BNP/TSH/INR   Lab Results   Component Value Date    CREATININE 2.40 (H) 09/17/2020    BUN 57 (H) 09/17/2020    K 4.2 09/17/2020     (L) 09/17/2020    CL 95 (L) 09/17/2020    CO2 30 09/17/2020    Lab Results   Component Value Date    WBC 5.8 07/10/2020    HGB 11.3 (L) 07/10/2020    HCT 34.2 (L) 07/10/2020     (H) 07/10/2020     (L) 07/10/2020    Lab Results   Component Value Date    TROPONINI <0.01 05/26/2020     (H) 06/25/2020    TSH 2.44 02/12/2020    INR 1.20 (H) 12/21/2018

## 2021-06-29 NOTE — PROGRESS NOTES
Progress Notes by Winsome Davis MD at 8/6/2020  1:10 PM     Author: Winsome Davis MD Service: -- Author Type: Physician    Filed: 8/6/2020  1:58 PM Encounter Date: 8/6/2020 Status: Signed    : Winsome Davis MD (Physician)         HEART CARE NOTE          Assessment/Recommendations     1. HFpEF c/b ADHF  Assessment / Plan    Denies orthopnea, PND. He does endorse abdominal distension and some LE edema    Unfortunately, Mr. Ocampo's abdominal ascites is not only recurrent , but now requires more frequent encounters for paracentesis (most recent paracentesis 7/27/20 with 6.25 L of fluid removed; will get CMP this visit); additionally, continues to have progressive dyspnea which now occurs at rest and reports overall feeling of being unwell --> There was a goals of care discussion and referral to [facility] at recent HF clinic visit, but there hasn't been a formal visit; Will make sure [facility] appointment is scheduled    Patient's abdomen is significantly distended today --> get repeat US and paracentesis ordered (may need to place standing q 2 week order); will also get CMP to assess liver function    BP borderline; will hold spironolactone; concerned that this may be related to liver dysfunction which is likely 2/2 to longstanding HFpEF--> labs ordered as noted above     2. Chronic afib  Assessment / Plan    Follows with afib clinic    Currently on sotalol and Xarelto     3. DM2  Assessment / Plan    Management per PMD    Currently on metformin     4. Hypothyroidism  Assessment / Plan    Currently on levothyroxine    History of Present Illness/Subjective      Mr. Campos Ocampo is a 86 y.o. male with a PMHx significant for HTN, chronic A. fib anticoagulated on Xarelto, HLD, hypothyroidism, DMT2, COPD, and CHF who presents to [facility] for follow-up visit.     Today, Mr. Ocampo endorses persistent dyspnea with exertion and reports that functional capacity continues to  decline. He denies orthopnea or PND while endorsing mild edema and progressive abdominal distension.    ECG: Personally reviewed. unchanged from previous tracings, normal sinus rhythm.     ECHO (personnaly Reviewed):     Left Ventricle: Left ventricle not well visualized. Normal left ventricular systolic function.The estimated left ventricular ejection fraction is 60%. This represents a normal ejection fraction. Unable to assess diastolic function given technical limitations and lack of apical views.    The left ventricular wall motion is normal.    Right Ventricle: The right ventricle is mildly dilated. Right ventricle wall thickness is normal.    IVC: Normal central venous pressure. Estimated central venous pressure equal to 3 mmHg.    No previous study for comparison.          Physical Examination Review of Systems   Vitals:    08/06/20 1313   BP: 92/58   Pulse:    SpO2:      Body mass index is 21.97 kg/m .  Wt Readings from Last 3 Encounters:   08/06/20 162 lb (73.5 kg)   07/09/20 178 lb 3.2 oz (80.8 kg)   06/29/20 175 lb (79.4 kg)     General Appearance:   no distress   ENT/Mouth: membranes moist, no oral lesions or bleeding gums.      EYES:  no scleral icterus, normal conjunctivae   Neck: no carotid bruits or thyromegaly   Chest/Lungs:   lungs are clear to auscultation, no rales or wheezing, equal chest wall expansion    Cardiovascular:   Regular. Normal first and second heart sounds withno murmurs, rubs, or gallops; the carotid, radial and posterior tibial pulses are intact, mild JVD and +  edema bilaterally    Abdomen:  Non tender, b+distension   Extremities: no cyanosis or clubbing   Skin: no xanthelasma, warm.    Neurologic: normal gait, normal  bilateral, no tremors     Psychiatric: alert and oriented x3, calm     A complete 10 systems ROS was reviewed  And is negative except what is listed in the HPI.          Medical History  Surgical History Family History Social History   Past Medical History:    Diagnosis Date   ? Atrial fibrillation (H)    ? CHF (congestive heart failure) (H)    ? Diabetes mellitus (H)    ? VILLEGAS (dyspnea on exertion) 12/21/2018   ? Hyperlipidemia LDL goal <100 9/6/2018   ? Hypertension    ? Kidney stones    ? Ptosis, left eyelid    ? Severe chronic obstructive pulmonary disease (H) 11/1/2018   ? Shingles    ? Subdural hematoma (H)     traumatic after a fall    Past Surgical History:   Procedure Laterality Date   ? CARDIOVERSION  02/11/2019    nsr x 1 min, then afib   ? CHOLECYSTECTOMY     ? eyelid surgery Left 04/2018   ? KIDNEY STONE SURGERY  2018   ?  PARACENTESIS  5/27/2020   ?  PARACENTESIS  7/16/2020   ?  PARACENTESIS  7/27/2020    no family history of premature coronary artery disease Social History     Socioeconomic History   ? Marital status:      Spouse name: Not on file   ? Number of children: 4   ? Years of education: Not on file   ? Highest education level: Not on file   Occupational History   ? Not on file   Social Needs   ? Financial resource strain: Not on file   ? Food insecurity     Worry: Not on file     Inability: Not on file   ? Transportation needs     Medical: Not on file     Non-medical: Not on file   Tobacco Use   ? Smoking status: Never Smoker   ? Smokeless tobacco: Never Used   Substance and Sexual Activity   ? Alcohol use: Yes     Alcohol/week: 1.0 standard drinks     Types: 1 Glasses of wine per week     Comment: rare   ? Drug use: No   ? Sexual activity: Never   Lifestyle   ? Physical activity     Days per week: Not on file     Minutes per session: Not on file   ? Stress: Not on file   Relationships   ? Social connections     Talks on phone: Not on file     Gets together: Not on file     Attends Latter-day service: Not on file     Active member of club or organization: Not on file     Attends meetings of clubs or organizations: Not on file     Relationship status: Not on file   ? Intimate partner violence     Fear of current or ex partner: Not on  file     Emotionally abused: Not on file     Physically abused: Not on file     Forced sexual activity: Not on file   Other Topics Concern   ? Not on file   Social History Narrative    Has 4 sons. Has 7 grandchildren. Has 1 great grandchild.     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     Has been  63 years in 2018.     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            Lab Results    Chemistry/lipid CBC Cardiac Enzymes/BNP/TSH/INR   Lab Results   Component Value Date    CREATININE 1.67 (H) 06/27/2020    BUN 33 (H) 06/27/2020    K 3.8 06/27/2020     06/27/2020    CL 98 06/27/2020    CO2 34 (H) 06/27/2020    Lab Results   Component Value Date    WBC 5.8 07/10/2020    HGB 11.3 (L) 07/10/2020    HCT 34.2 (L) 07/10/2020     (H) 07/10/2020     (L) 07/10/2020    Lab Results   Component Value Date    TROPONINI <0.01 05/26/2020     (H) 06/25/2020    TSH 2.44 02/12/2020    INR 1.20 (H) 12/21/2018     Lab Results   Component Value Date    TROPONINI <0.01 05/26/2020          Weight:    Wt Readings from Last 3 Encounters:   07/09/20 178 lb 3.2 oz (80.8 kg)   06/29/20 175 lb (79.4 kg)   06/27/20 173 lb (78.5 kg)       Allergies  Allergies   Allergen Reactions   ? Penicillins Unknown     Tolerated CTX Jan 2020         Surgical History  Past Surgical History:   Procedure Laterality Date   ? CARDIOVERSION  02/11/2019    nsr x 1 min, then afib   ? CHOLECYSTECTOMY     ? eyelid surgery Left 04/2018   ? KIDNEY STONE SURGERY  2018   ? US PARACENTESIS  5/27/2020   ? US PARACENTESIS  7/16/2020   ? US PARACENTESIS  7/27/2020       Social History  Tobacco:   Social History     Tobacco Use   Smoking Status Never Smoker   Smokeless Tobacco Never Used    Alcohol:   Social History     Substance and Sexual Activity   Alcohol Use Yes   ? Alcohol/week: 1.0 standard drinks   ?  Types: 1 Glasses of wine per week    Comment: rare    Illicit Drugs:   Social History     Substance and Sexual Activity   Drug Use No       Family History  Family History   Problem Relation Age of Onset   ? Stroke Mother         cerebral hemorrage   ? Emphysema Father           Kim Davis on 8/6/2020      cc: Sandro Parker MD,

## 2021-06-29 NOTE — PROGRESS NOTES
"Progress Notes by Stephanie Roberts CNP at 8/20/2020  1:30 PM     Author: Stephanie Roberts CNP Service: -- Author Type: Nurse Practitioner    Filed: 8/20/2020  1:51 PM Encounter Date: 8/20/2020 Status: Signed    : Stephanie Roberts CNP (Nurse Practitioner)           The patient has been notified of following:     \"This telephone visit will be conducted via a call between you and your physician/provider. We have found that certain health care needs can be provided without the need for a physical exam.  This service lets us provide the care you need with a phone conversation.  If a prescription is necessary we can send it directly to your pharmacy.  If lab work is needed we can place an order for that and you can then stop by our lab to have the test done at a later time. If during the course of the call the physician/provider feels a telephone visit is not appropriate, you will not be charged for this service.\" Verbal consent has been obtained for this service by care team member:         HEART CARE PHONE ENCOUNTER        The patient has chosen to have the visit conducted as a telephone visit, to reduce risk of exposure given the current status of Coronavirus in our community. This telephone visit is being conducted via a call between the patient and physician/provider. Health care needs are being provided without a physical exam.     Assessment/Recommendations   Assessment:    1.  Heart failure with preserved ejection fraction: He states his dyspnea on exertion has improved slightly since his paracentesis on August 17.  He had 7.4 L removed.  He continues to have abdominal distention.  His weight is stable.  His weight today was 162 pounds.  He continues to follow a low-sodium diet.  He met with palliative care a few days ago and will continue to follow-up with them.    2.  Hypotension: Blood pressure today 85/53.  He denies dizziness.  We discussed with his symptoms he is in advanced heart " failure.  He states he is drinking plenty of fluids.    3.  Persistent atrial fibrillation: He continues Xarelto and sotalol    Plan:  1.  Continue current medications  2.  Continue daily weights and low-sodium diet  3.  Standing order for paracentesis every 2 weeks  4.  Continue compression socks      Follow Up Plan: Follow up with Dr Davis September 10 and in the heart failure clinic in 6 weeks   I have reviewed the note as documented.  This accurately captures the substance of my conversation with the patient.    Total time of call between patient and provider was 12 minutes   Start Time: 1330  Stop Time: 1342       History of Present Illness/Subjective    Campos Ocampo is a 87 y.o. male who is being evaluated via a billable telephone visit.  He follows up for heart failure with preserved ejection fraction.  His most recent echocardiogram was done in May which showed ejection fraction of 60% and RV mildly dilated.  He had a paracentesis on August 17 and had 7.4 L removed.  He has a past medical history significant for COPD, hypertension, hyperlipidemia, diabetes, persistent atrial fibrillation.  He lives with his wife at [facility].  He met with palliative care a few days ago and will continue to work with them.    Today, he states his dyspnea on exertion has improved slightly since his paracentesis.  He continues to have abdominal distention.  He denies orthopnea or PND.  He states his edema is stable and continues to wear compression socks.  He denies dizziness or lightheadedness.    His weight today is 162 pounds which is stable.  He follows a low-sodium diet.      I have reviewed and updated the patient's Past Medical History, Social History, Family History and Medication List.     Physical Examination not performed given phone encounter Review of Systems                                                Medical History  Surgical History Family History Social History   Past Medical History:    Diagnosis Date   ? Atrial fibrillation (H)    ? CHF (congestive heart failure) (H)    ? Diabetes mellitus (H)    ? VILLEGAS (dyspnea on exertion) 12/21/2018   ? Hyperlipidemia LDL goal <100 9/6/2018   ? Hypertension    ? Kidney stones    ? Ptosis, left eyelid    ? Severe chronic obstructive pulmonary disease (H) 11/1/2018   ? Shingles    ? Subdural hematoma (H)     traumatic after a fall    Past Surgical History:   Procedure Laterality Date   ? CARDIOVERSION  02/11/2019    nsr x 1 min, then afib   ? CHOLECYSTECTOMY     ? eyelid surgery Left 04/2018   ? KIDNEY STONE SURGERY  2018   ?  PARACENTESIS  5/27/2020   ?  PARACENTESIS  7/16/2020   ?  PARACENTESIS  7/27/2020   ?  PARACENTESIS  8/17/2020    Family History   Problem Relation Age of Onset   ? Stroke Mother         cerebral hemorrage   ? Emphysema Father     Social History     Socioeconomic History   ? Marital status:      Spouse name: Not on file   ? Number of children: 4   ? Years of education: Not on file   ? Highest education level: Not on file   Occupational History   ? Not on file   Social Needs   ? Financial resource strain: Not on file   ? Food insecurity     Worry: Not on file     Inability: Not on file   ? Transportation needs     Medical: Not on file     Non-medical: Not on file   Tobacco Use   ? Smoking status: Never Smoker   ? Smokeless tobacco: Never Used   Substance and Sexual Activity   ? Alcohol use: Yes     Alcohol/week: 1.0 standard drinks     Types: 1 Glasses of wine per week     Comment: rare   ? Drug use: No   ? Sexual activity: Never   Lifestyle   ? Physical activity     Days per week: Not on file     Minutes per session: Not on file   ? Stress: Not on file   Relationships   ? Social connections     Talks on phone: Not on file     Gets together: Not on file     Attends Mandaeism service: Not on file     Active member of club or organization: Not on file     Attends meetings of clubs or organizations: Not on file     Relationship  status: Not on file   ? Intimate partner violence     Fear of current or ex partner: Not on file     Emotionally abused: Not on file     Physically abused: Not on file     Forced sexual activity: Not on file   Other Topics Concern   ? Not on file   Social History Narrative    Has 4 sons. Has 7 grandchildren. Has 1 great grandchild.     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     Has been  63 years in 2018.     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           Medications  Allergies   Current Outpatient Medications   Medication Sig Dispense Refill   ? albuterol (PROAIR HFA;PROVENTIL HFA;VENTOLIN HFA) 90 mcg/actuation inhaler Inhale 2 puffs every 4 (four) hours as needed for wheezing or shortness of breath. Use with spacer 1 Inhaler 11   ? bumetanide (BUMEX) 1 MG tablet Take 1 tablet (1 mg total) by mouth 2 (two) times a day. 60 tablet 11   ? cholecalciferol, vitamin D3, 1,000 unit tablet Take 1,000 Units by mouth daily.     ? fluticasone propion-salmeterol (ADVAIR DISKUS) 250-50 mcg/dose DISKUS Inhale 1 puff 2 (two) times a day. 3 each 3   ? levothyroxine (SYNTHROID, LEVOTHROID) 175 MCG tablet TAKE 1 TABLET BY MOUTH EVERY MORNING AT 6 AM 90 tablet 0   ? magnesium oxide (MAGOX) 400 mg (241.3 mg magnesium) tablet Take 1 tablet (400 mg total) by mouth 2 (two) times a day. (Patient taking differently: Take 400 mg by mouth daily. ) 200 tablet 1   ? memantine (NAMENDA) 5 MG tablet Take 5 mg by mouth at bedtime.   1   ? metFORMIN (GLUCOPHAGE) 500 MG tablet Take 1 tablet (500 mg) by mouth 2 times a day with meals. 180 tablet 0   ? multivitamin with minerals (THERA-M) 9 mg iron-400 mcg Tab tablet Take 1 tablet by mouth daily.     ? sotaloL (BETAPACE) 80 MG tablet Take 1 tablet (80 mg total) by mouth 2 (two) times a day. 180 tablet 3   ? XARELTO 20 mg tablet TAKE 1 TABLET DAILY WITH  SUPPER 90 tablet 1     No current facility-administered medications for this visit.     Allergies   Allergen Reactions   ? Penicillins Unknown     Tolerated CTX Jan 2020         Lab Results    Chemistry/lipid CBC Cardiac Enzymes/BNP/TSH/INR   Lab Results   Component Value Date    CREATININE 1.91 (H) 08/19/2020    BUN 43 (H) 08/19/2020    K 4.1 08/19/2020     08/19/2020    CL 97 (L) 08/19/2020    CO2 32 (H) 08/19/2020    Lab Results   Component Value Date    WBC 5.8 07/10/2020    HGB 11.3 (L) 07/10/2020    HCT 34.2 (L) 07/10/2020     (H) 07/10/2020     (L) 07/10/2020    Lab Results   Component Value Date    TROPONINI <0.01 05/26/2020     (H) 06/25/2020    TSH 2.44 02/12/2020    INR 1.20 (H) 12/21/2018        Stephanie Roberts

## 2021-06-29 NOTE — PROGRESS NOTES
"Progress Notes by Winsome Davis MD at 5/22/2020  3:10 PM     Author: Winsome Davis MD Service: -- Author Type: Physician    Filed: 5/22/2020  3:13 PM Encounter Date: 5/22/2020 Status: Signed    : Winsome Davis MD (Physician)       The patient has been notified of following:     \"This telephone visit will be conducted via a call between you and your physician/provider. We have found that certain health care needs can be provided without the need for a physical exam.  This service lets us provide the care you need with a phone conversation.  If a prescription is necessary we can send it directly to your pharmacy.  If lab work is needed we can place an order for that and you can then stop by our lab to have the test done at a later time. If during the course of the call the physician/provider feels a telephone visit is not appropriate, you will not be charged for this service.\" Verbal consent has been obtained for this service by care team member:         HEART CARE PHONE ENCOUNTER        The patient has chosen to have the visit conducted as a telephone visit, to reduce risk of exposure given the current status of Coronavirus in our community. This telephone visit is being conducted via a call between the patient and physician/provider. Health care needs are being provided without a physical exam.       Assessment/Recommendations     1. HFpEF  Assessment / Plan    Endorses orthopnea and progressive abdominal distension; also reports progressive weight gain for several weeks.    Furosemide intensified to 60 mg two times a day; will have him take metolazone 2.5 mg one time with repeat lab work the next day.    [facility] RN will call on Tuesday (8.25.20) to follow-up. Low threshold for hospital admission and IV diuresis    2. Chronic afib  Assessment / Plan    Follows with afib clinic    Currently on sotalol and Xarelto    3. DM2  Assessment / Plan    Management per " PMD    Currently on metformin    4. Hypothyroidism  Assessment / Plan    Currently on levothyroxine    Follow Up Plan: Follow up in 1 week with HF [facility] and with me in 6 weeks (5 weeks after HF [facility] visit). Thank you  I have reviewed the note as documented.  This accurately captures the substance of my conversation with the patient.      Start Time: 2:45 pm   Stop Time: 3:10 pm      History of Present Illness/Subjective    Mr. Campos Ocampo is a 86 y.o. male who is being evaluated via a billable telephone visit.      Mr. Campos Ocampo is a 86 y.o. male with a PMHx significant for HTN, chronic A. fib anticoagulated on Xarelto, HLD, hypothyroidism, DMT2, COPD, and CHF who presents to [facility] to establish care.     Today, Mr. Ocampo reports progressive fatigue, abdominal distension, and fluid retention for several weeks. He denies orthopnea, but does endorse PND and progressive dyspnea.  He reports poor functional capacity 2/2 to fatigue. He denies chest pain, palpitations, lightheadedness, presyncope/syncope.     Of note, Mr. Ocampo was recently seen in the ED for ADHF. His furosemide dosing has since been adjusted to 60 mg two times a day.     I have reviewed and updated the patient's Past Medical History, Social History, Family History and Medication List.    ECG: Personally reviewed. unchanged from previous tracings, normal sinus rhythm.    ECHO (personnaly Reviewed):     Left Ventricle: Left ventricle not well visualized. Normal left ventricular systolic function.The estimated left ventricular ejection fraction is 60%. This represents a normal ejection fraction. Unable to assess diastolic function given technical limitations and lack of apical views.    The left ventricular wall motion is normal.    Right Ventricle: The right ventricle is mildly dilated. Right ventricle wall thickness is normal.    IVC: Normal central venous pressure. Estimated central venous pressure equal to 3 mmHg.    No previous study  for comparison.       Review of Systems:  A complete 10 systems ROS was reviewed  And is negative except what is listed in the HPI.         Medical History  Surgical History Family History Social History   Past Medical History:   Diagnosis Date   ? Atrial fibrillation (H)    ? CHF (congestive heart failure) (H)    ? Diabetes mellitus (H)    ? VILLEGAS (dyspnea on exertion) 12/21/2018   ? Hyperlipidemia LDL goal <100 9/6/2018   ? Hypertension    ? Kidney stones    ? Ptosis, left eyelid    ? Severe chronic obstructive pulmonary disease (H) 11/1/2018   ? Shingles    ? Subdural hematoma (H)     traumatic after a fall    Past Surgical History:   Procedure Laterality Date   ? CARDIOVERSION  02/11/2019    nsr x 1 min, then afib   ? CHOLECYSTECTOMY     ? eyelid surgery Left 04/2018   ? KIDNEY STONE SURGERY  2018    no family history of premature coronary artery disease Social History     Socioeconomic History   ? Marital status:      Spouse name: Not on file   ? Number of children: 4   ? Years of education: Not on file   ? Highest education level: Not on file   Occupational History   ? Not on file   Social Needs   ? Financial resource strain: Not on file   ? Food insecurity     Worry: Not on file     Inability: Not on file   ? Transportation needs     Medical: Not on file     Non-medical: Not on file   Tobacco Use   ? Smoking status: Never Smoker   ? Smokeless tobacco: Never Used   Substance and Sexual Activity   ? Alcohol use: Yes     Alcohol/week: 1.0 standard drinks     Types: 1 Glasses of wine per week     Comment: rare   ? Drug use: No   ? Sexual activity: Never   Lifestyle   ? Physical activity     Days per week: Not on file     Minutes per session: Not on file   ? Stress: Not on file   Relationships   ? Social connections     Talks on phone: Not on file     Gets together: Not on file     Attends Jehovah's witness service: Not on file     Active member of club or organization: Not on file     Attends meetings of clubs or  organizations: Not on file     Relationship status: Not on file   ? Intimate partner violence     Fear of current or ex partner: Not on file     Emotionally abused: Not on file     Physically abused: Not on file     Forced sexual activity: Not on file   Other Topics Concern   ? Not on file   Social History Narrative    Has 4 sons. Has 7 grandchildren. Has 1 great grandchild.     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     Has been  63 years in 2018.     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            Lab Results    Chemistry/lipid CBC Cardiac Enzymes/BNP/TSH/INR   Lab Results   Component Value Date    CREATININE 1.21 05/15/2020    BUN 29 (H) 05/15/2020    K 4.0 05/15/2020     05/15/2020    CL 98 05/15/2020    CO2 32 (H) 05/15/2020    Lab Results   Component Value Date    WBC 4.9 05/15/2020    HGB 10.7 (L) 05/15/2020    HCT 31.4 (L) 05/15/2020     05/15/2020     (L) 05/15/2020    Lab Results   Component Value Date    TROPONINI 0.01 05/15/2020     (H) 05/15/2020    TSH 2.44 02/12/2020    INR 1.20 (H) 12/21/2018     Lab Results   Component Value Date    TROPONINI 0.01 05/15/2020          Weight:    Wt Readings from Last 3 Encounters:   05/21/20 188 lb (85.3 kg)   05/15/20 187 lb (84.8 kg)   05/13/20 187 lb 3.2 oz (84.9 kg)       Allergies  Allergies   Allergen Reactions   ? Penicillins Unknown         Surgical History  Past Surgical History:   Procedure Laterality Date   ? CARDIOVERSION  02/11/2019    nsr x 1 min, then afib   ? CHOLECYSTECTOMY     ? eyelid surgery Left 04/2018   ? KIDNEY STONE SURGERY  2018       Social History  Tobacco:   Social History     Tobacco Use   Smoking Status Never Smoker   Smokeless Tobacco Never Used    Alcohol:   Social History     Substance and Sexual Activity   Alcohol Use Yes   ? Alcohol/week: 1.0 standard drinks    ? Types: 1 Glasses of wine per week    Comment: rare    Illicit Drugs:   Social History     Substance and Sexual Activity   Drug Use No       Family History  Family History   Problem Relation Age of Onset   ? Stroke Mother         cerebral hemorrage   ? Emphysema Father           Kim Davis on 5/22/2020      cc: Sandro Parker MD,

## 2021-06-29 NOTE — PROGRESS NOTES
"Progress Notes by Jennifer Nye CNP at 6/12/2020  9:50 AM     Author: Jennifer Nye CNP Service: -- Author Type: Nurse Practitioner    Filed: 6/12/2020  9:54 AM Encounter Date: 6/12/2020 Status: Signed    : Jennifer Nye CNP (Nurse Practitioner)           The patient has been notified of following:     \"This telephone visit will be conducted via a call between you and your physician/provider. We have found that certain health care needs can be provided without the need for a physical exam.  This service lets us provide the care you need with a phone conversation.  If a prescription is necessary we can send it directly to your pharmacy.  If lab work is needed we can place an order for that and you can then stop by our lab to have the test done at a later time. If during the course of the call the physician/provider feels a telephone visit is not appropriate, you will not be charged for this service.\" Verbal consent has been obtained for this service by care team member:         HEART CARE PHONE ENCOUNTER        The patient has chosen to have the visit conducted as a telephone visit, to reduce risk of exposure given the current status of Coronavirus in our community. This telephone visit is being conducted via a call between the patient and physician/provider. Health care needs are being provided without a physical exam.     Assessment/Recommendations   Assessment:    1.  Heart failure with preserved ejection fraction: He has no symptoms of acute heart failure.  Lower extremity edema continues to decrease and is minimal.  Weight is stable.  He follows a low-sodium diet.    2.  Persistent atrial fibrillation: Continue sotalol and Xarelto.    3.  Anemia and thrombocytopenia: He had a virtual visit with hematology on June 9.  Reviewed note and labs.  He is waiting to hear back from hematologist on plan.    Plan:  1.  Continue current medications  2.  Continue low-sodium diet  3.  " Continue daily weights and blood pressure monitoring      Follow Up Plan: Follow up with Dr. Davis on July 6 and in the heart failure clinic in 2 months.  I have reviewed the note as documented.  This accurately captures the substance of my conversation with the patient.    Total time of call between patient and provider was 7 minutes   Start Time: 9:41  Stop Time: 9:48       History of Present Illness/Subjective    Campos Ocampo is a 86 y.o. male who is being evaluated via a billable telephone visit.  He has a history of heart failure with preserved ejection fraction, atrial fibrillation, dyslipidemia, COPD, diabetes, and hypertension.  He was hospitalized May 11 to May 13, 2020 with acute heart failure.  He was again hospitalized May 26 to May 28 with acute heart failure exacerbation.  Echocardiogram on May 12, 2020 showed ejection fraction of 60%.  He was diuresed and diuretic changed to Bumex.  He was also started on spironolactone.    Campos continues to have dyspnea on exertion but denies any worsening.  He has no symptoms of acute heart failure.  He states that his lower extremity edema continues to decrease and is minimal today. He denies lightheadedness, orthopnea and chest pain.      His home weight is stable at 172 pounds.  Blood pressure today 90/72 but is asymptomatic.  He follows a low-sodium diet.    ECHO:   Results for orders placed during the hospital encounter of 05/11/20   Echo Complete [ECH10] 05/12/2020    Narrative   Left Ventricle: Left ventricle not well visualized. Normal left   ventricular systolic function.The estimated left ventricular ejection   fraction is 60%. This represents a normal ejection fraction. Unable to   assess diastolic function given technical limitations and lack of apical   views.    The left ventricular wall motion is normal.    Right Ventricle: The right ventricle is mildly dilated. Right ventricle   wall thickness is normal.    IVC: Normal central venous  pressure. Estimated central venous pressure   equal to 3 mmHg.    No previous study for comparison.     Technically very challenging study.  No interpretable parasternal or   apical views obtained.  On subxiphoid views, the left ventricular systolic   function is within normal limits.  No regional wall motion normalities   noted but cannot exclude the possibility of a small regional wall motion   normality.  Right ventricular systolic function appears to be at the lower   limit of normal and there may be mild right ventricular enlargement.    Valvular function is difficult to evaluate but no severe valvular issues   documented.    Would consider transesophageal echocardiogram or cardiac MRI of further   structural information would be clinically useful.           I have reviewed and updated the patient's Past Medical History, Social History, Family History and Medication List.     Physical Examination not performed given phone encounter Review of Systems                                                Medical History  Surgical History Family History Social History   Past Medical History:   Diagnosis Date   ? Atrial fibrillation (H)    ? CHF (congestive heart failure) (H)    ? Diabetes mellitus (H)    ? VILLEGAS (dyspnea on exertion) 12/21/2018   ? Hyperlipidemia LDL goal <100 9/6/2018   ? Hypertension    ? Kidney stones    ? Ptosis, left eyelid    ? Severe chronic obstructive pulmonary disease (H) 11/1/2018   ? Shingles    ? Subdural hematoma (H)     traumatic after a fall    Past Surgical History:   Procedure Laterality Date   ? CARDIOVERSION  02/11/2019    nsr x 1 min, then afib   ? CHOLECYSTECTOMY     ? eyelid surgery Left 04/2018   ? KIDNEY STONE SURGERY  2018   ? US PARACENTESIS  5/27/2020    Family History   Problem Relation Age of Onset   ? Stroke Mother         cerebral hemorrage   ? Emphysema Father     Social History     Socioeconomic History   ? Marital status:      Spouse name: Not on file   ? Number of  children: 4   ? Years of education: Not on file   ? Highest education level: Not on file   Occupational History   ? Not on file   Social Needs   ? Financial resource strain: Not on file   ? Food insecurity     Worry: Not on file     Inability: Not on file   ? Transportation needs     Medical: Not on file     Non-medical: Not on file   Tobacco Use   ? Smoking status: Never Smoker   ? Smokeless tobacco: Never Used   Substance and Sexual Activity   ? Alcohol use: Yes     Alcohol/week: 1.0 standard drinks     Types: 1 Glasses of wine per week     Comment: rare   ? Drug use: No   ? Sexual activity: Never   Lifestyle   ? Physical activity     Days per week: Not on file     Minutes per session: Not on file   ? Stress: Not on file   Relationships   ? Social connections     Talks on phone: Not on file     Gets together: Not on file     Attends Zoroastrianism service: Not on file     Active member of club or organization: Not on file     Attends meetings of clubs or organizations: Not on file     Relationship status: Not on file   ? Intimate partner violence     Fear of current or ex partner: Not on file     Emotionally abused: Not on file     Physically abused: Not on file     Forced sexual activity: Not on file   Other Topics Concern   ? Not on file   Social History Narrative    Has 4 sons. Has 7 grandchildren. Has 1 great grandchild.     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     Has been  63 years in 2018.     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           Medications  Allergies   Current Outpatient Medications   Medication Sig Dispense Refill   ? albuterol (PROAIR HFA;PROVENTIL HFA;VENTOLIN HFA) 90 mcg/actuation inhaler Inhale 2 puffs every 4 (four) hours as needed for wheezing or shortness of breath. Use with spacer 1 Inhaler 11   ? bumetanide (BUMEX) 1 MG tablet Take 1 tablet (1  mg total) by mouth 2 (two) times a day. 60 tablet 11   ? cholecalciferol, vitamin D3, 1,000 unit tablet Take 1,000 Units by mouth daily.     ? fluticasone propion-salmeterol (ADVAIR DISKUS) 250-50 mcg/dose DISKUS Inhale 1 puff 2 (two) times a day. 3 each 3   ? levothyroxine (SYNTHROID, LEVOTHROID) 175 MCG tablet TAKE 1 TABLET BY MOUTH EVERY MORNING AT 6 AM 90 tablet 0   ? magnesium oxide (MAGOX) 400 mg (241.3 mg magnesium) tablet Take 1 tablet (400 mg total) by mouth 2 (two) times a day. 200 tablet 1   ? magnesium oxide 400 mg magnesium Tab Take 1 tablet by mouth every evening.     ? memantine (NAMENDA) 5 MG tablet Take 5 mg by mouth at bedtime.   1   ? metFORMIN (GLUCOPHAGE) 500 MG tablet Take 1 tablet (500 mg total) by mouth 2 (two) times a day with meals. 180 tablet 1   ? multivitamin with minerals (THERA-M) 9 mg iron-400 mcg Tab tablet Take 1 tablet by mouth daily.     ? sotaloL (BETAPACE) 80 MG tablet Take 1 tablet (80 mg total) by mouth 2 (two) times a day. 180 tablet 3   ? spironolactone (ALDACTONE) 25 MG tablet Take 1 tablet (25 mg total) by mouth daily. 30 tablet 11   ? XARELTO 20 mg tablet TAKE 1 TABLET DAILY WITH SUPPER 90 tablet 1     No current facility-administered medications for this visit.     Allergies   Allergen Reactions   ? Penicillins Unknown     Tolerated CTX Jan 2020         Lab Results    Chemistry/lipid CBC Cardiac Enzymes/BNP/TSH/INR   Lab Results   Component Value Date    CREATININE 1.20 06/01/2020    BUN 25 06/01/2020    K 4.1 06/01/2020     06/01/2020    CL 95 (L) 06/01/2020    CO2 35 (H) 06/01/2020    Lab Results   Component Value Date    WBC 5.6 06/10/2020    HGB 11.5 (L) 06/10/2020    HCT 34.0 (L) 06/10/2020     (H) 06/10/2020     (L) 06/10/2020    Lab Results   Component Value Date    TROPONINI <0.01 05/26/2020     (H) 05/26/2020    TSH 2.44 02/12/2020    INR 1.20 (H) 12/21/2018

## 2021-06-29 NOTE — PROGRESS NOTES
"Progress Notes by Winsome Davis MD at 7/6/2020  2:10 PM     Author: Winsome Davis MD Service: -- Author Type: Physician    Filed: 7/6/2020  2:18 PM Encounter Date: 7/6/2020 Status: Signed    : Winsome Davis MD (Physician)       The patient has been notified of following:     \"This telephone visit will be conducted via a call between you and your physician/provider. We have found that certain health care needs can be provided without the need for a physical exam.  This service lets us provide the care you need with a phone conversation.  If a prescription is necessary we can send it directly to your pharmacy.  If lab work is needed we can place an order for that and you can then stop by our lab to have the test done at a later time. If during the course of the call the physician/provider feels a telephone visit is not appropriate, you will not be charged for this service.\" Verbal consent has been obtained for this service by care team member:         HEART CARE PHONE ENCOUNTER        The patient has chosen to have the visit conducted as a telephone visit, to reduce risk of exposure given the current status of Coronavirus in our community. This telephone visit is being conducted via a call between the patient and physician/provider. Health care needs are being provided without a physical exam.         Assessment/Recommendations   1. HFpEF c/b ADHF  Assessment / Plan    Denies orthopnea, PND or edema. He does endorse abdominal distension.    Unfortunately, despite sounding like his overall volume status is adequate, Mr. Ocampo continues to have progressive dyspnea which now occurs at rest and reports overall feeling of being unwell.     Will have patient present for an in-clinic evaluation this week for physical exam; may need US of abdomen and paracentesis depending on ascites; if those are unrevealing, pulmonary consult may be necessary     2. Chronic afib  Assessment " / Plan    Follows with afib clinic    Currently on sotalol and Xarelto     3. DM2  Assessment / Plan    Management per PMD    Currently on metformin     4. Hypothyroidism  Assessment / Plan    Currently on levothyroxine      Follow Up Plan: Follow-up with in-clinic HF visit with a HF VINH this week   I have reviewed the note as documented.  This accurately captures the substance of my conversation with the patient.    Start Time: 1:56 pm  Stop Time: 2:17 pm      History of Present Illness/Subjective    Mr. Campos Ocampo is a 86 y.o. male with a PMHx significant for HTN, chronic A. fib anticoagulated on Xarelto, HLD, hypothyroidism, DMT2, COPD, and CHF who presents to [facility] for follow-up visit.     Today, Mr. Ocampo endorses progressive dyspnea while denying any HF of orthopnea, PND, or edema. He also endorses abdominal distention.      ECG: Personally reviewed. unchanged from previous tracings, normal sinus rhythm.     ECHO (personnaly Reviewed):     Left Ventricle: Left ventricle not well visualized. Normal left ventricular systolic function.The estimated left ventricular ejection fraction is 60%. This represents a normal ejection fraction. Unable to assess diastolic function given technical limitations and lack of apical views.    The left ventricular wall motion is normal.    Right Ventricle: The right ventricle is mildly dilated. Right ventricle wall thickness is normal.    IVC: Normal central venous pressure. Estimated central venous pressure equal to 3 mmHg.    No previous study for comparison.       Review of Systems:  A complete 10 systems ROS was reviewed  And is negative except what is listed in the HPI.         Medical History  Surgical History Family History Social History   Past Medical History:   Diagnosis Date   ? Atrial fibrillation (H)    ? CHF (congestive heart failure) (H)    ? Diabetes mellitus (H)    ? VILLEGAS (dyspnea on exertion) 12/21/2018   ? Hyperlipidemia LDL goal <100 9/6/2018   ?  Hypertension    ? Kidney stones    ? Ptosis, left eyelid    ? Severe chronic obstructive pulmonary disease (H) 11/1/2018   ? Shingles    ? Subdural hematoma (H)     traumatic after a fall    Past Surgical History:   Procedure Laterality Date   ? CARDIOVERSION  02/11/2019    nsr x 1 min, then afib   ? CHOLECYSTECTOMY     ? eyelid surgery Left 04/2018   ? KIDNEY STONE SURGERY  2018   ? US PARACENTESIS  5/27/2020    no family history of premature coronary artery disease Social History     Socioeconomic History   ? Marital status:      Spouse name: Not on file   ? Number of children: 4   ? Years of education: Not on file   ? Highest education level: Not on file   Occupational History   ? Not on file   Social Needs   ? Financial resource strain: Not on file   ? Food insecurity     Worry: Not on file     Inability: Not on file   ? Transportation needs     Medical: Not on file     Non-medical: Not on file   Tobacco Use   ? Smoking status: Never Smoker   ? Smokeless tobacco: Never Used   Substance and Sexual Activity   ? Alcohol use: Yes     Alcohol/week: 1.0 standard drinks     Types: 1 Glasses of wine per week     Comment: rare   ? Drug use: No   ? Sexual activity: Never   Lifestyle   ? Physical activity     Days per week: Not on file     Minutes per session: Not on file   ? Stress: Not on file   Relationships   ? Social connections     Talks on phone: Not on file     Gets together: Not on file     Attends Sikh service: Not on file     Active member of club or organization: Not on file     Attends meetings of clubs or organizations: Not on file     Relationship status: Not on file   ? Intimate partner violence     Fear of current or ex partner: Not on file     Emotionally abused: Not on file     Physically abused: Not on file     Forced sexual activity: Not on file   Other Topics Concern   ? Not on file   Social History Narrative    Has 4 sons. Has 7 grandchildren. Has 1 great grandchild.     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     Has been  63 years in 2018.     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            Lab Results    Chemistry/lipid CBC Cardiac Enzymes/BNP/TSH/INR   Lab Results   Component Value Date    CREATININE 1.67 (H) 06/27/2020    BUN 33 (H) 06/27/2020    K 3.8 06/27/2020     06/27/2020    CL 98 06/27/2020    CO2 34 (H) 06/27/2020    Lab Results   Component Value Date    WBC 4.9 06/25/2020    HGB 10.5 (L) 06/25/2020    HCT 30.8 (L) 06/25/2020     (H) 06/25/2020     (L) 06/25/2020    Lab Results   Component Value Date    TROPONINI <0.01 05/26/2020     (H) 06/25/2020    TSH 2.44 02/12/2020    INR 1.20 (H) 12/21/2018     Lab Results   Component Value Date    TROPONINI <0.01 05/26/2020          Weight:    Wt Readings from Last 3 Encounters:   06/29/20 175 lb (79.4 kg)   06/27/20 173 lb (78.5 kg)   06/12/20 172 lb 9.6 oz (78.3 kg)       Allergies  Allergies   Allergen Reactions   ? Penicillins Unknown     Tolerated CTX Jan 2020         Surgical History  Past Surgical History:   Procedure Laterality Date   ? CARDIOVERSION  02/11/2019    nsr x 1 min, then afib   ? CHOLECYSTECTOMY     ? eyelid surgery Left 04/2018   ? KIDNEY STONE SURGERY  2018   ? US PARACENTESIS  5/27/2020       Social History  Tobacco:   Social History     Tobacco Use   Smoking Status Never Smoker   Smokeless Tobacco Never Used    Alcohol:   Social History     Substance and Sexual Activity   Alcohol Use Yes   ? Alcohol/week: 1.0 standard drinks   ? Types: 1 Glasses of wine per week    Comment: rare    Illicit Drugs:   Social History     Substance and Sexual Activity   Drug Use No       Family History  Family History   Problem Relation Age of Onset   ? Stroke Mother         cerebral hemorrage   ? Emphysema Father           Kim Davis on 7/6/2020      cc:  Sandro Parker MD,

## 2021-06-29 NOTE — PROGRESS NOTES
"Progress Notes by Stephanie Roberts CNP at 7/23/2020  2:10 PM     Author: Stephanie Roberts CNP Service: -- Author Type: Nurse Practitioner    Filed: 7/23/2020  2:10 PM Encounter Date: 7/23/2020 Status: Signed    : Stephanie Roberts CNP (Nurse Practitioner)           The patient has been notified of following:     \"This telephone visit will be conducted via a call between you and your physician/provider. We have found that certain health care needs can be provided without the need for a physical exam.  This service lets us provide the care you need with a phone conversation.  If a prescription is necessary we can send it directly to your pharmacy.  If lab work is needed we can place an order for that and you can then stop by our lab to have the test done at a later time. If during the course of the call the physician/provider feels a telephone visit is not appropriate, you will not be charged for this service.\" Verbal consent has been obtained for this service by care team member:         HEART CARE PHONE ENCOUNTER        The patient has chosen to have the visit conducted as a telephone visit, to reduce risk of exposure given the current status of Coronavirus in our community. This telephone visit is being conducted via a call between the patient and physician/provider. Health care needs are being provided without a physical exam.     Assessment/Recommendations   Assessment:    1.  Heart failure with preserved ejection fraction: He states his abdominal distention has worsened since his last paracentesis.  He is getting uncomfortable again.  He had a paracentesis on July 16 and had 7.5 L removed.  His weight has decreased approximately 12 pounds.  He continues to have dyspnea on exertion, fatigue, distention and edema.  He states palliative care has not contacted him to set up an appointment.  I told him I will reach out to them today to contact him.    2.  Hypertension: Controlled.  Blood pressure " at home 110/70    3.  Persistent atrial fibrillation: He continues Xarelto and sotalol    Plan:  1. Continue current medications  2. Ordered therapeutic ultrasound guided paracentesis  3. Continue daily weights and low sodium diet  4. I will reach out to palliative care to contact patient to make appointment      Follow Up Plan: Follow up with Dr Davis August 6 and in the heart failure clinic in 4 weeks   I have reviewed the note as documented.  This accurately captures the substance of my conversation with the patient.    Total time of call between patient and provider was 9 minutes   Start Time: 1350  Stop Time: 1359       History of Present Illness/Subjective    Campos Ocampo is a 86 y.o. male who is being evaluated via a billable telephone visit.  He follows up for heart failure with preserved ejection fraction.  His most recent echocardiogram was done in May which showed ejection fraction of 60% and RV mildly dilated.  He had a paracentesis on July 16 and had 7.5 L removed.  He has a past medical history significant for COPD, hypertension, hyperlipidemia, diabetes, persistent atrial fibrillation.  He lives with his [facility] at [facility].    Today, he states his abdominal distention is getting worse and uncomfortable.  He does had a paracentesis on July 16.  He continues to have chronic fatigue, dyspnea on exertion and edema.  He denies orthopnea, PND or chest pain.    His weight has decreased to 163 pounds.  He follows a low-sodium diet.      I have reviewed and updated the patient's Past Medical History, Social History, Family History and Medication List.     Physical Examination not performed given phone encounter Review of Systems                                                Medical History  Surgical History Family History Social History   Past Medical History:   Diagnosis Date   ? Atrial fibrillation (H)    ? CHF (congestive heart failure) (H)    ? Diabetes mellitus (H)    ? VILLEGAS (dyspnea on  exertion) 12/21/2018   ? Hyperlipidemia LDL goal <100 9/6/2018   ? Hypertension    ? Kidney stones    ? Ptosis, left eyelid    ? Severe chronic obstructive pulmonary disease (H) 11/1/2018   ? Shingles    ? Subdural hematoma (H)     traumatic after a fall    Past Surgical History:   Procedure Laterality Date   ? CARDIOVERSION  02/11/2019    nsr x 1 min, then afib   ? CHOLECYSTECTOMY     ? eyelid surgery Left 04/2018   ? KIDNEY STONE SURGERY  2018   ?  PARACENTESIS  5/27/2020   ?  PARACENTESIS  7/16/2020    Family History   Problem Relation Age of Onset   ? Stroke Mother         cerebral hemorrage   ? Emphysema Father     Social History     Socioeconomic History   ? Marital status:      Spouse name: Not on file   ? Number of children: 4   ? Years of education: Not on file   ? Highest education level: Not on file   Occupational History   ? Not on file   Social Needs   ? Financial resource strain: Not on file   ? Food insecurity     Worry: Not on file     Inability: Not on file   ? Transportation needs     Medical: Not on file     Non-medical: Not on file   Tobacco Use   ? Smoking status: Never Smoker   ? Smokeless tobacco: Never Used   Substance and Sexual Activity   ? Alcohol use: Yes     Alcohol/week: 1.0 standard drinks     Types: 1 Glasses of wine per week     Comment: rare   ? Drug use: No   ? Sexual activity: Never   Lifestyle   ? Physical activity     Days per week: Not on file     Minutes per session: Not on file   ? Stress: Not on file   Relationships   ? Social connections     Talks on phone: Not on file     Gets together: Not on file     Attends Qu[employer] service: Not on file     Active member of club or organization: Not on file     Attends meetings of clubs or organizations: Not on file     Relationship status: Not on file   ? Intimate partner violence     Fear of current or ex partner: Not on file     Emotionally abused: Not on file     Physically abused: Not on file     Forced sexual activity:  Not on file   Other Topics Concern   ? Not on file   Social History Narrative    Has 4 sons. Has 7 grandchildren. Has 1 great grandchild.     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     Has been  63 years in 2018.     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           Medications  Allergies   Current Outpatient Medications   Medication Sig Dispense Refill   ? albuterol (PROAIR HFA;PROVENTIL HFA;VENTOLIN HFA) 90 mcg/actuation inhaler Inhale 2 puffs every 4 (four) hours as needed for wheezing or shortness of breath. Use with spacer 1 Inhaler 11   ? bumetanide (BUMEX) 1 MG tablet Take 1 tablet (1 mg total) by mouth 2 (two) times a day. 60 tablet 11   ? cholecalciferol, vitamin D3, 1,000 unit tablet Take 1,000 Units by mouth daily.     ? fluticasone propion-salmeterol (ADVAIR DISKUS) 250-50 mcg/dose DISKUS Inhale 1 puff 2 (two) times a day. 3 each 3   ? levothyroxine (SYNTHROID, LEVOTHROID) 175 MCG tablet TAKE 1 TABLET BY MOUTH EVERY MORNING AT 6 AM 90 tablet 0   ? magnesium oxide (MAGOX) 400 mg (241.3 mg magnesium) tablet Take 1 tablet (400 mg total) by mouth 2 (two) times a day. (Patient taking differently: Take 400 mg by mouth daily. ) 200 tablet 1   ? memantine (NAMENDA) 5 MG tablet Take 5 mg by mouth at bedtime.   1   ? metFORMIN (GLUCOPHAGE) 500 MG tablet Take 1 tablet (500 mg total) by mouth 2 (two) times a day with meals. 180 tablet 1   ? multivitamin with minerals (THERA-M) 9 mg iron-400 mcg Tab tablet Take 1 tablet by mouth daily.     ? sotaloL (BETAPACE) 80 MG tablet Take 1 tablet (80 mg total) by mouth 2 (two) times a day. 180 tablet 3   ? spironolactone (ALDACTONE) 25 MG tablet Take 1 tablet (25 mg total) by mouth daily. 30 tablet 11   ? XARELTO 20 mg tablet TAKE 1 TABLET DAILY WITH SUPPER 90 tablet 1     No current facility-administered medications for this visit.      Allergies   Allergen Reactions   ? Penicillins Unknown     Tolerated CTX Jan 2020         Lab Results    Chemistry/lipid CBC Cardiac Enzymes/BNP/TSH/INR   Lab Results   Component Value Date    CREATININE 1.67 (H) 06/27/2020    BUN 33 (H) 06/27/2020    K 3.8 06/27/2020     06/27/2020    CL 98 06/27/2020    CO2 34 (H) 06/27/2020    Lab Results   Component Value Date    WBC 5.8 07/10/2020    HGB 11.3 (L) 07/10/2020    HCT 34.2 (L) 07/10/2020     (H) 07/10/2020     (L) 07/10/2020    Lab Results   Component Value Date    TROPONINI <0.01 05/26/2020     (H) 06/25/2020    TSH 2.44 02/12/2020    INR 1.20 (H) 12/21/2018        Stephanie Roberts

## 2021-07-03 NOTE — ADDENDUM NOTE
Addendum Note by Beckie Stevens RN at 7/24/2019  2:38 PM     Author: Beckie Stevens RN Service: -- Author Type: Registered Nurse    Filed: 7/24/2019  2:38 PM Encounter Date: 7/24/2019 Status: Signed    : Beckie Stevens RN (Registered Nurse)    Addended by: BECKIE STEVENS on: 7/24/2019 02:38 PM        Modules accepted: Orders

## 2021-07-03 NOTE — ADDENDUM NOTE
Addendum Note by Sandro Parker MD at 8/9/2019  8:50 AM     Author: Sandro Parker MD Service: -- Author Type: Physician    Filed: 8/9/2019  5:13 PM Encounter Date: 8/9/2019 Status: Signed    : Sandro Parker MD (Physician)    Addended by: SANDRO PARKER on: 8/9/2019 05:13 PM        Modules accepted: Orders

## 2021-07-03 NOTE — ADDENDUM NOTE
Addendum Note by Sandro Parker MD at 8/9/2019  8:50 AM     Author: Sandro Parker MD Service: -- Author Type: Physician    Filed: 8/9/2019  2:17 PM Encounter Date: 8/9/2019 Status: Signed    : Sandro Parker MD (Physician)    Addended by: SANDRO PARKER on: 8/9/2019 02:17 PM        Modules accepted: Orders

## 2021-07-03 NOTE — ADDENDUM NOTE
Addendum Note by Yessica Coello CMA at 8/9/2019  8:50 AM     Author: Yessica Coello CMA Service: -- Author Type: Certified Medical Assistant    Filed: 8/9/2019  5:12 PM Encounter Date: 8/9/2019 Status: Signed    : Yessica Coello CMA (Certified Medical Assistant)    Addended by: YESSICA COELLO on: 8/9/2019 05:12 PM        Modules accepted: Orders

## 2021-07-14 PROBLEM — I50.9 ACUTE HEART FAILURE, UNSPECIFIED HEART FAILURE TYPE (H): Status: RESOLVED | Noted: 2020-01-01 | Resolved: 2020-01-01

## 2021-10-20 NOTE — TELEPHONE ENCOUNTER
"Called patient regarding echo complete order.  Per exiting  pt stated \"he feels fine and doesn't need the echo\".      Attempted to call to clarify that the echo is needed prior to PVI to assess his valve status as well as his left atrium.  IF the patient does not wish to move forward with PVI then at that time we can cancel the order.    LM for patient to return call.    Kayley  "
Pt returned phone call and understands the need for echo prior to PVI if he does choose to move forward with procedure.     Informed pt that we will keep the order in the chart until he decides.    Pt stated that at his office visit he marked on his med list that he was no longer taking Metformin 500mg/twice daily. He stated this was a mistake and that he IS taking the medication and that it was prescribed by his PCP Dr. He.  Will re-enter this into the pt medication list.    No further questions or concerns.    Kayley  
no

## 2022-06-07 NOTE — PROGRESS NOTES
Start: 1250
Cecum:1253
TI intubation: no
End: 1301

[place] Bowel Prep Score:     8
-right colon:                              2
-transverse colon:                    3
-left colon:                                 3 TCM DISCHARGE FOLLOW UP CALL    Discharge Date:  1/13/2020  Reason for hospital stay (discharge diagnosis)::  Ben CAP  Are you feeling better, the same or worse since your discharge?:  Patient is feeling better (Pt is sleeping better and can sleep falt. He has VILLEGAS and can't walk up stairs. He has an occas cough, sputum clear. Denies fever, CP.)  Do you feel like you have a plan in the event of a health emergency?: Yes (Wife)    As part of your discharge plan, were  home care services ordered for you?: No    Did you receive any new medications, or was there a change to your medications?: Yes    Are you taking those medications, or do you have any established regiment?:  Wife helps pt with meds. He is taking cefdinir two times a day and prednisone 40 mg daily. Instead of Robitussin, pt is taking mucinex.  Do you have any follow up visits scheduled with your PCP or Specialist?:  Yes, with PCP  (RN) Is PCP appt scheduled soon enough (within 14 days of discharge date)?: Yes (1/22.)    RN NOTES::  Encouraged rest between activities

## 2023-09-19 NOTE — TELEPHONE ENCOUNTER
MEDICARE WELLNESS VISIT + NOTE    CHIEF COMPLAINT:  Garry Saul Jr. presents for his Subsequent Annual Medicare Wellness Visit.   His additional complaints or concerns are addressed below.      Patient Care Team:  Josey Baez MD as PCP - General (Family Practice)        Patient Active Problem List   Diagnosis   • Type 2 diabetes mellitus without complication, without long-term current use of insulin (CMD)   • Vitamin D deficiency   • Hypertension complicating diabetes (CMD)   • Bipolar 1 disorder, depressed, full remission (CMD)         No past medical history on file.      Past Surgical History:   Procedure Laterality Date   • No past surgeries           Social History     Tobacco Use   • Smoking status: Never   • Smokeless tobacco: Never   Vaping Use   • Vaping Use: never used   Substance Use Topics   • Alcohol use: Not Currently     Comment: socially rarely   • Drug use: Never     Family History   Problem Relation Age of Onset   • Diabetes Mother    • Stroke Father    • Hypertension Sister          Current Outpatient Medications   Medication Sig Dispense Refill   • glimepiride (AMARYL) 1 MG tablet Take 1 tablet by mouth daily (before breakfast). 90 tablet 1   • amlodipine-benazepril (LOTREL) 5-10 MG per capsule TAKE 1 CAPSULE BY MOUTH DAILY. 90 capsule 0   • atorvastatin (LIPITOR) 20 MG tablet Take 1 tablet by mouth daily. 90 tablet 3   • metformin (GLUCOPHAGE) 1000 MG tablet Take 1 tablet by mouth in the morning and 1 tablet in the evening. Take with meals. 180 tablet 3   • OLANZapine (ZyPREXA) 10 MG tablet Take 10 mg by mouth daily.     • blood glucose (DOROTHEA CONTOUR NEXT TEST) test strip Test blood sugar 1 times daily. Diagnosis: Diabetes. Meter: Contour Next. 100 each 3   • Blood Glucose Monitoring Suppl (DOROTHEA CONTOUR NEXT MONITOR) w/Device Kit 1 each as directed. Use to check blood sugar.     • VITAMIN D, CHOLECALCIFEROL, PO        No current facility-administered medications for this visit.     Wellness Screening Tool  Symptom Screening:  Do you have one of the following NEW symptoms:    Fever (subjective or >100.0)?  No    A new cough?  No    Shortness of breath?  No     Chills? No     New loss of taste or smell? No     Generalized body aches? No     New persistent headache? No     New sore throat? No     Nausea, vomiting, or diarrhea?  No    Within the past 2 weeks, have you been exposed to someone with a known positive illness below:    COVID-19 (known or suspected)?  No    Chicken pox?  No    Mealses?  No    Pertussis?  No    Patient notified of visitor policy- They may have one person accompany them to their appointment, but they will need to wear a mask and will be screened upon arrival for symptoms: Yes  Pt informed to wear a mask: Yes  Pt notified if they develop any symptoms listed above, prior to their appointment, they are to call the clinic directly at [phone] for further instructions.  Yes  Patient's appointment status: Patient will be seen in clinic as scheduled on 9/10/20            The following items on the Medicare Health Risk Assessment were found to be positive  1.) Do you have an Advance directive, living will, or power of  for health care document that contains your wishes for end of life care?: No     2.) Would you like additional information on advance directives?: Yes     4.) During the past 4 weeks, what was the hardest physical activity you could do for at least 2 minutes?: Light     6 a.) How many servings of Fruits and Vegetables do you have each day ( 1 serving = 1 piece of fruit, 1/2 cup fruits or vegetables): 1 per day     6 b.) How many servings of High Fiber / Whole Grain Foods to you have each day ( 1 serving = 1 cup cold cereal, 1/2 cup cooked cereal, 1 slice bread): 1 per day         Vision and Hearing screens:   Vision Screening    Right eye Left eye Both eyes   Without correction 20 20 20 20    With correction      Hearing to finger rub intact bilaterally.     Advance care planning documents on file - no    Cognitive/Functional Status: no evidence of cognitive dysfunction by direct observation and Mini-Cog performed with score of 5    Opioid Review: Garry is not taking opioid medications.    Recent PHQ 2/9 Score:    PHQ 2:  PHQ 2 Score Adult PHQ 2 Score Adult PHQ 2 Interpretation Little interest or pleasure in activity?   9/19/2023  10:57 AM 0 No further screening needed 0        DEPRESSION ASSESSMENT/PLAN:  Depression managed by psychiatry.      Body mass index is 22.3 kg/m².    BMI ASSESSMENT/PLAN:  Patient BMI is within normal range.     See Patient Instructions section.   Return in about 6 months (around 3/19/2024) for Follow up.      OUTPATIENT PROGRESS NOTE    Subjective   Chief Complaint Medicare Wellness Visit and Diabetes    Patient presents for MWV and follow up.   No acute concerns today.     No hypoglycemic episodes.  Sometimes gets a sweet craving and eats a small portion of sweets.   Went on vacation a few days ago to [place] for 3 days.      Eating healthy overall. Sometimes eats only 2x/day.   Oatmeal with peanuts, banana.   Keeping well hydrated throughout the day. Drinking lemon water every morning.   In the past, had leg cramps in the mornings. Since eating a banana a day, the cramps have significantly improved. Last had cramp a few months ago.      Aunt is Tonia Jaramillo.     Lives with Mom, who just got COVID likely from work.       Medications  Medications were reviewed and updated today.    Histories  I have personally reviewed and updated the patient's past medical, past surgical, family and social histories during today's visit.    Review of Systems   All other systems reviewed and are negative.      Objective   Visit Vitals  /78 (BP Location: LUE - Left upper extremity, Patient Position: Sitting, Cuff Size: Regular)   Pulse 86   Resp 14   Ht 5' 3.5\" (1.613 m)   Wt 58 kg (127 lb 13.9 oz)   SpO2 98%   BMI 22.30 kg/m²     Physical Exam  Vitals and nursing note reviewed.   Constitutional:       General: He is not in acute distress.     Appearance: He is not ill-appearing.   HENT:      Head: Normocephalic and atraumatic.      Right Ear: Tympanic membrane and external ear normal.      Left Ear: Tympanic membrane and external ear normal.   Eyes:      Extraocular Movements: Extraocular movements intact.      Conjunctiva/sclera: Conjunctivae normal.      Pupils: Pupils are equal, round, and reactive to light.   Cardiovascular:      Rate and Rhythm: Normal rate and regular rhythm.   Pulmonary:      Effort: Pulmonary effort is normal. No respiratory distress.      Breath sounds: Normal breath sounds.   Abdominal:      Palpations: Abdomen is soft.      Tenderness: There is no abdominal tenderness.   Musculoskeletal:      Cervical back: Neck supple. No tenderness.      Right lower leg: No edema.      Left lower leg: No edema.   Skin:     General: Skin is warm.   Neurological:      General: No focal deficit present.      Mental Status: He is alert.  Mental status is at baseline.   Psychiatric:         Mood and Affect: Mood normal.         Behavior: Behavior normal.       Assessment & Plan   Diagnoses and associated orders for this visit:  1. Type 2 diabetes mellitus without complication, without long-term current use of insulin (CMD)  Assessment & Plan:  Well controlled with A1C of 6.8, stable from before. CPM.   Orders:  -     POCT Glycohemoglobin Analyzer  -     SERVICE TO OPHTHALMOLOGY  -     Glimepiride  2. Influenza vaccine needed  -     INFLUENZA QUADRIVALENT SPLIT PRES FREE 0.5 ML VACC, IM (FLULAVAL,FLUARIX,FLUZONE)  3. Need for vaccination against Streptococcus pneumoniae  -     PNEUMOCOCCAL CONJUGATE 20 VALENT VACC (PREVNAR-20)  4. Hypertension complicating diabetes (CMD)  Assessment & Plan:  Well controlled. CPM.   5. Bipolar 1 disorder, depressed, full remission (CMD)  Assessment & Plan:  Mood has been doing well lately. Continues to take Olanzapine and following with psychiatry.

## 2023-10-02 NOTE — LETTER
Letter by Henry Rodriguez MD at      Author: Henry Rodriguez MD Service: -- Author Type: --    Filed:  Encounter Date: 3/6/2019 Status: (Other)       Kimmy He MD  [address]  [facility] [address]                                  March 6, 2019    Patient: Campos Ocampo   MR Number: [id number]   YOB: 1933   Date of Visit: 3/6/2019     Dear Dr. Neri MD:    Thank you for referring Campos Ocampo to me for evaluation. Below are the relevant portions of my assessment and plan of care.    If you have questions, please do not hesitate to call me. I look forward to following Campos along with you.    Sincerely,        Henry Rodriguez MD          CC  No Recipients  Henry Rodriguez MD  3/6/2019  3:05 PM  Sign at close encounter  Assessment and Plan:  85 male with history of HTN, DM, never smoker, presented to pulmonary clinic 12/21/2018 for evaluation of chronic dyspnea on exertion. PFTs show severe obstruction with positive bronchodilator response and probably underestimated lung volumes as these were done with N2 washout. Unclear why he should have any obstruction as he is essentially a never smoker. No relevant occupational exposures. He did not improve with trial of ICS/LABA. He appeared to be in atrial fibrillation/atrial flutter with RVR based on EKG done in clinic, and was sent to the ED for evaluation.  Atrial fibrillation was confirmed, an outpatient cardioversion was attempted however unsuccessful, and the patient is now rate controlled.  He reports however that his dyspnea on exertion continues unchanged.  He tried Symbicort before, but did not tolerate it.  His pulmonary function tests show severe COPD, which is surprising and that he has never smoked, and has had no occupational exposures nor secondhand smoke exposure aside from living with his father up until the age of 18 years old.     Recommendations:  -Check alpha-1 antitrypsin  level   -Obtain noncontrast chest CT to evaluate for bullae or other abnormality which may contribute to COPD  -Pulmonary rehab ordered  -Start Spiriva and albuterol as needed.  Spacer with teaching provided in clinic today  -Return to pulmonary clinic in 2 weeks with repeat spirometry and DLCO      CCx: Dyspnea on exertion    HPI: The patient states that he continues to have dyspnea on exertion with walking up inclines, stairs, or long distances that is not significantly changed since he has started treatment for his atrial fibrillation.  He denies cough, fevers, chills, lower extremity swelling, or other symptoms he tried Symbicort, however he did not tolerate it.  He has been a  and has a doctorate in psychology, and has no known occupational exposures since working with his father who smoked as a teenager.    ROS:  A review of 12 organ systems was performed with pertinent positives and negatives noted in the HPI.      Current Meds:  Current Outpatient Medications   Medication Sig Note   ? cholecalciferol, vitamin D3, 1,000 unit tablet Take 1,000 Units by mouth daily.    ? diltiazem (CARDIZEM CD) 120 MG 24 hr capsule Take 1 capsule (120 mg total) by mouth daily.    ? flecainide (TAMBOCOR) 100 MG tablet Take 1 tablet (100 mg total) by mouth 2 (two) times a day.    ? levothyroxine (SYNTHROID, LEVOTHROID) 175 MCG tablet TAKE 1 TABLET BY MOUTH EVERY MORNING AT 6 AM    ? losartan (COZAAR) 25 MG tablet Take 1 tablet (25 mg total) by mouth daily.    ? metFORMIN (GLUCOPHAGE) 500 MG tablet Take 1 tablet (500 mg total) by mouth 2 (two) times a day with meals.    ? multivitamin with minerals (THERA-M) 9 mg iron-400 mcg Tab tablet Take 1 tablet by mouth daily.    ? niacin 500 MG tablet Take 500 mg by mouth. 9/6/2018: Received from: [facility]  Received Sig: Take 500 mg by mouth once daily.     ? rivaroxaban 20 mg Tab Take 1 tablet (20 mg total) by mouth daily with supper. 2/18/2019: No missed doses in the  last 3 weeks.  With a full meal   ? albuterol (PROAIR HFA;PROVENTIL HFA;VENTOLIN HFA) 90 mcg/actuation inhaler Inhale 2 puffs every 6 (six) hours as needed for wheezing or shortness of breath. Use with spacer    ? tiotropium (SPIRIVA) 18 mcg inhalation capsule Place 1 capsule (2 puffs total) into inhaler and inhale daily.        Labs:  No results found for this or any previous visit (from the past 72 hour(s)).    I have personally reviewed all pertinent imaging studies and PFT results unless otherwise noted.    Imaging studies:  No results found.      Physical Exam:  /58   Pulse 71   Resp 18   Wt 194 lb (88 kg)   SpO2 98% Comment: RA  BMI 26.31 kg/m     General - Well nourished  Ears/Mouth -  OP pink moist, no thrush  Neck - no cervical lymphadenopathy  Lungs - Clear to ausculation bilaterally   CVS - regular rhythm with no murmurs, rubs or gallups  Abdomen - soft, NT, ND, NABS  Ext - no cyanosis, clubbing or edema  Skin - no rash  Psychology - alert and oriented, answers appropriate        Electronically signed by:    Rickey Rodriguez MD  [facility]           Arava Counseling:  Patient counseled regarding adverse effects of Arava including but not limited to nausea, vomiting, abnormalities in liver function tests. Patients may develop mouth sores, rash, diarrhea, and abnormalities in blood counts. The patient understands that monitoring is required including LFTs and blood counts.  There is a rare possibility of scarring of the liver and lung problems that can occur when taking methotrexate. Persistent nausea, loss of appetite, pale stools, dark urine, cough, and shortness of breath should be reported immediately. Patient advised to discontinue Arava treatment and consult with a physician prior to attempting conception. The patient will have to undergo a treatment to eliminate Arava from the body prior to conception.

## 2025-02-10 NOTE — PROGRESS NOTES
Ashley Hutchinson, CNP paged re: pt currently in SR with SA and 1st degree AVB. EKG obtained and confirmed.    Stephanie Tian, RN     discussed medical issues with YASMEEN Saul  no indication for emergent endocrinology consult at this time  awaiting t3, t4 levels